# Patient Record
Sex: MALE | Race: WHITE | NOT HISPANIC OR LATINO | Employment: FULL TIME | URBAN - METROPOLITAN AREA
[De-identification: names, ages, dates, MRNs, and addresses within clinical notes are randomized per-mention and may not be internally consistent; named-entity substitution may affect disease eponyms.]

---

## 2017-01-08 ENCOUNTER — HOSPITAL ENCOUNTER (EMERGENCY)
Facility: HOSPITAL | Age: 57
Discharge: HOME | End: 2017-01-08
Attending: EMERGENCY MEDICINE | Admitting: EMERGENCY MEDICINE
Payer: COMMERCIAL

## 2017-01-08 VITALS
BODY MASS INDEX: 39.01 KG/M2 | TEMPERATURE: 97.5 F | RESPIRATION RATE: 20 BRPM | HEART RATE: 83 BPM | DIASTOLIC BLOOD PRESSURE: 87 MMHG | WEIGHT: 288 LBS | HEIGHT: 72 IN | OXYGEN SATURATION: 95 % | SYSTOLIC BLOOD PRESSURE: 139 MMHG

## 2017-01-08 DIAGNOSIS — R09.81 NASAL CONGESTION: Primary | ICD-10-CM

## 2017-01-08 PROCEDURE — 99283 EMERGENCY DEPT VISIT LOW MDM: CPT

## 2017-01-08 RX ORDER — FLUTICASONE PROPIONATE 50 MCG
1 SPRAY, SUSPENSION (ML) NASAL 2 TIMES DAILY
Qty: 1 BOTTLE | Refills: 0 | Status: SHIPPED | OUTPATIENT
Start: 2017-01-08 | End: 2017-10-31

## 2017-01-08 RX ORDER — CETIRIZINE HYDROCHLORIDE, PSEUDOEPHEDRINE HYDROCHLORIDE 5; 120 MG/1; MG/1
1 TABLET, FILM COATED, EXTENDED RELEASE ORAL 2 TIMES DAILY
Qty: 20 TABLET | Refills: 0 | Status: SHIPPED | OUTPATIENT
Start: 2017-01-08 | End: 2017-01-18

## 2017-01-13 DIAGNOSIS — E78.5 HYPERLIPIDEMIA: ICD-10-CM

## 2017-01-13 DIAGNOSIS — I10 ESSENTIAL (PRIMARY) HYPERTENSION: ICD-10-CM

## 2017-01-30 ENCOUNTER — GENERIC CONVERSION - ENCOUNTER (OUTPATIENT)
Dept: OTHER | Facility: OTHER | Age: 57
End: 2017-01-30

## 2017-02-27 ENCOUNTER — GENERIC CONVERSION - ENCOUNTER (OUTPATIENT)
Dept: OTHER | Facility: OTHER | Age: 57
End: 2017-02-27

## 2017-03-02 ENCOUNTER — HOSPITAL ENCOUNTER (EMERGENCY)
Facility: HOSPITAL | Age: 57
Discharge: HOME | End: 2017-03-02
Attending: EMERGENCY MEDICINE | Admitting: EMERGENCY MEDICINE
Payer: COMMERCIAL

## 2017-03-02 VITALS
WEIGHT: 275 LBS | OXYGEN SATURATION: 98 % | DIASTOLIC BLOOD PRESSURE: 103 MMHG | BODY MASS INDEX: 37.25 KG/M2 | HEART RATE: 89 BPM | RESPIRATION RATE: 20 BRPM | TEMPERATURE: 97.8 F | SYSTOLIC BLOOD PRESSURE: 141 MMHG | HEIGHT: 72 IN

## 2017-03-02 DIAGNOSIS — R68.89 FLU-LIKE SYMPTOMS: Primary | ICD-10-CM

## 2017-03-02 PROCEDURE — A9270 NON-COVERED ITEM OR SERVICE: HCPCS | Performed by: EMERGENCY MEDICINE

## 2017-03-02 PROCEDURE — 99283 EMERGENCY DEPT VISIT LOW MDM: CPT

## 2017-03-02 RX ORDER — PREDNISONE 20 MG/1
60 TABLET ORAL DAILY
Qty: 15 TABLET | Refills: 0 | Status: SHIPPED | OUTPATIENT
Start: 2017-03-02 | End: 2017-03-07

## 2017-03-02 RX ORDER — ALBUTEROL SULFATE 90 UG/1
2 AEROSOL, METERED RESPIRATORY (INHALATION) ONCE
Status: COMPLETED | OUTPATIENT
Start: 2017-03-02 | End: 2017-03-02

## 2017-03-02 RX ORDER — ALBUTEROL SULFATE 90 UG/1
2 AEROSOL, METERED RESPIRATORY (INHALATION) EVERY 4 HOURS PRN
Qty: 1 INHALER | Refills: 0 | Status: SHIPPED | OUTPATIENT
Start: 2017-03-02 | End: 2017-04-01

## 2017-03-02 RX ORDER — PREDNISONE 20 MG/1
60 TABLET ORAL ONCE
Status: COMPLETED | OUTPATIENT
Start: 2017-03-02 | End: 2017-03-02

## 2017-03-02 RX ADMIN — PREDNISONE 60 MG: 20 TABLET ORAL at 03:42

## 2017-03-02 RX ADMIN — ALBUTEROL SULFATE 2 PUFF: 90 AEROSOL, METERED RESPIRATORY (INHALATION) at 03:42

## 2017-03-03 ENCOUNTER — ALLSCRIPTS OFFICE VISIT (OUTPATIENT)
Dept: OTHER | Facility: OTHER | Age: 57
End: 2017-03-03

## 2017-03-09 ENCOUNTER — GENERIC CONVERSION - ENCOUNTER (OUTPATIENT)
Dept: OTHER | Facility: OTHER | Age: 57
End: 2017-03-09

## 2017-03-13 ENCOUNTER — APPOINTMENT (EMERGENCY)
Dept: CT IMAGING | Facility: HOSPITAL | Age: 57
End: 2017-03-13
Payer: COMMERCIAL

## 2017-03-13 ENCOUNTER — HOSPITAL ENCOUNTER (EMERGENCY)
Facility: HOSPITAL | Age: 57
Discharge: HOME | End: 2017-03-13
Attending: EMERGENCY MEDICINE | Admitting: EMERGENCY MEDICINE
Payer: COMMERCIAL

## 2017-03-13 VITALS
SYSTOLIC BLOOD PRESSURE: 148 MMHG | HEIGHT: 71 IN | RESPIRATION RATE: 18 BRPM | BODY MASS INDEX: 39.2 KG/M2 | DIASTOLIC BLOOD PRESSURE: 90 MMHG | OXYGEN SATURATION: 98 % | WEIGHT: 280 LBS | HEART RATE: 100 BPM | TEMPERATURE: 99.2 F

## 2017-03-13 DIAGNOSIS — R11.0 NAUSEA: Primary | ICD-10-CM

## 2017-03-13 DIAGNOSIS — R19.7 DIARRHEA, UNSPECIFIED TYPE: ICD-10-CM

## 2017-03-13 LAB
ALBUMIN SERPL BCP-MCNC: 3.8 G/DL (ref 3.5–5)
ALP SERPL-CCNC: 66 U/L (ref 46–116)
ALT SERPL W P-5'-P-CCNC: 33 U/L (ref 12–78)
ANION GAP SERPL CALCULATED.3IONS-SCNC: 9 MMOL/L (ref 4–13)
AST SERPL W P-5'-P-CCNC: 17 U/L (ref 5–45)
ATRIAL RATE: 100 BPM
BASOPHILS # BLD MANUAL: 0 THOUSAND/UL (ref 0–0.1)
BASOPHILS NFR MAR MANUAL: 0 % (ref 0–1)
BILIRUB SERPL-MCNC: 0.7 MG/DL (ref 0.2–1)
BUN SERPL-MCNC: 24 MG/DL (ref 5–25)
CALCIUM SERPL-MCNC: 9 MG/DL (ref 8.3–10.1)
CHLORIDE SERPL-SCNC: 102 MMOL/L (ref 100–108)
CLARITY, POC: ABNORMAL
CO2 SERPL-SCNC: 29 MMOL/L (ref 21–32)
COLOR, POC: YELLOW
CREAT SERPL-MCNC: 1.07 MG/DL (ref 0.6–1.3)
EOSINOPHIL # BLD MANUAL: 0 THOUSAND/UL (ref 0–0.4)
EOSINOPHIL NFR BLD MANUAL: 0 % (ref 0–6)
ERYTHROCYTE [DISTWIDTH] IN BLOOD BY AUTOMATED COUNT: 13.1 % (ref 11.6–15.1)
EXT BILIRUBIN, UA: NEGATIVE
EXT BLOOD URINE: NEGATIVE
EXT GLUCOSE, UA: NEGATIVE
EXT KETONES: NEGATIVE
EXT NITRITE, UA: NEGATIVE
EXT PH, UA: 5
EXT PROTEIN, UA: NEGATIVE
EXT SPECIFIC GRAVITY, UA: 1.02
EXT UROBILINOGEN: 0.2
GFR SERPL CREATININE-BSD FRML MDRD: >60 ML/MIN/1.73SQ M
GLUCOSE SERPL-MCNC: 134 MG/DL (ref 65–140)
HCT VFR BLD AUTO: 49.6 % (ref 36.5–49.3)
HGB BLD-MCNC: 17 G/DL (ref 12–17)
LYMPHOCYTES # BLD AUTO: 0.29 THOUSAND/UL (ref 0.6–4.47)
LYMPHOCYTES # BLD AUTO: 2 % (ref 14–44)
MCH RBC QN AUTO: 30.6 PG (ref 26.8–34.3)
MCHC RBC AUTO-ENTMCNC: 34.3 G/DL (ref 31.4–37.4)
MCV RBC AUTO: 89 FL (ref 82–98)
MONOCYTES # BLD AUTO: 0.29 THOUSAND/UL (ref 0–1.22)
MONOCYTES NFR BLD: 2 % (ref 4–12)
NEUTROPHILS # BLD MANUAL: 14.1 THOUSAND/UL (ref 1.85–7.62)
NEUTS SEG NFR BLD AUTO: 96 % (ref 43–75)
NRBC BLD AUTO-RTO: 0 /100 WBCS
P AXIS: 54 DEGREES
PLATELET # BLD AUTO: 193 THOUSANDS/UL (ref 149–390)
PLATELET BLD QL SMEAR: ADEQUATE
PMV BLD AUTO: 9 FL (ref 8.9–12.7)
POTASSIUM SERPL-SCNC: 4.3 MMOL/L (ref 3.5–5.3)
PR INTERVAL: 146 MS
PROT SERPL-MCNC: 7.9 G/DL (ref 6.4–8.2)
QRS AXIS: 11 DEGREES
QRSD INTERVAL: 74 MS
QT INTERVAL: 344 MS
QTC INTERVAL: 443 MS
RBC # BLD AUTO: 5.55 MILLION/UL (ref 3.88–5.62)
SODIUM SERPL-SCNC: 140 MMOL/L (ref 136–145)
T WAVE AXIS: 45 DEGREES
TOTAL CELLS COUNTED SPEC: 100
TROPONIN I SERPL-MCNC: <0.02 NG/ML
VENTRICULAR RATE: 100 BPM
WBC # BLD AUTO: 14.69 THOUSAND/UL (ref 4.31–10.16)
WBC # BLD EST: ABNORMAL 10*3/UL

## 2017-03-13 PROCEDURE — 36415 COLL VENOUS BLD VENIPUNCTURE: CPT | Performed by: PHYSICIAN ASSISTANT

## 2017-03-13 PROCEDURE — 99285 EMERGENCY DEPT VISIT HI MDM: CPT

## 2017-03-13 PROCEDURE — 74177 CT ABD & PELVIS W/CONTRAST: CPT

## 2017-03-13 PROCEDURE — 96374 THER/PROPH/DIAG INJ IV PUSH: CPT

## 2017-03-13 PROCEDURE — 96361 HYDRATE IV INFUSION ADD-ON: CPT

## 2017-03-13 PROCEDURE — 85027 COMPLETE CBC AUTOMATED: CPT | Performed by: PHYSICIAN ASSISTANT

## 2017-03-13 PROCEDURE — 93005 ELECTROCARDIOGRAM TRACING: CPT | Performed by: PHYSICIAN ASSISTANT

## 2017-03-13 PROCEDURE — 85007 BL SMEAR W/DIFF WBC COUNT: CPT | Performed by: PHYSICIAN ASSISTANT

## 2017-03-13 PROCEDURE — 96376 TX/PRO/DX INJ SAME DRUG ADON: CPT

## 2017-03-13 PROCEDURE — 80053 COMPREHEN METABOLIC PANEL: CPT | Performed by: PHYSICIAN ASSISTANT

## 2017-03-13 PROCEDURE — 84484 ASSAY OF TROPONIN QUANT: CPT | Performed by: PHYSICIAN ASSISTANT

## 2017-03-13 PROCEDURE — 81002 URINALYSIS NONAUTO W/O SCOPE: CPT | Performed by: PHYSICIAN ASSISTANT

## 2017-03-13 RX ORDER — ONDANSETRON 2 MG/ML
4 INJECTION INTRAMUSCULAR; INTRAVENOUS ONCE
Status: COMPLETED | OUTPATIENT
Start: 2017-03-13 | End: 2017-03-13

## 2017-03-13 RX ORDER — ONDANSETRON 4 MG/1
4 TABLET, ORALLY DISINTEGRATING ORAL EVERY 8 HOURS PRN
Qty: 15 TABLET | Refills: 0 | Status: SHIPPED | OUTPATIENT
Start: 2017-03-13 | End: 2017-05-04

## 2017-03-13 RX ADMIN — SODIUM CHLORIDE 1000 ML: 0.9 INJECTION, SOLUTION INTRAVENOUS at 17:51

## 2017-03-13 RX ADMIN — IOHEXOL 100 ML: 350 INJECTION, SOLUTION INTRAVENOUS at 17:35

## 2017-03-13 RX ADMIN — SODIUM CHLORIDE 1000 ML: 0.9 INJECTION, SOLUTION INTRAVENOUS at 15:22

## 2017-03-13 RX ADMIN — ONDANSETRON 4 MG: 2 INJECTION INTRAMUSCULAR; INTRAVENOUS at 15:22

## 2017-03-13 RX ADMIN — ONDANSETRON 4 MG: 2 INJECTION INTRAMUSCULAR; INTRAVENOUS at 17:51

## 2017-05-04 ENCOUNTER — HOSPITAL ENCOUNTER (EMERGENCY)
Facility: HOSPITAL | Age: 57
Discharge: HOME | End: 2017-05-04
Attending: EMERGENCY MEDICINE | Admitting: EMERGENCY MEDICINE
Payer: COMMERCIAL

## 2017-05-04 VITALS
SYSTOLIC BLOOD PRESSURE: 162 MMHG | RESPIRATION RATE: 18 BRPM | OXYGEN SATURATION: 95 % | HEIGHT: 72 IN | BODY MASS INDEX: 36.57 KG/M2 | WEIGHT: 270 LBS | DIASTOLIC BLOOD PRESSURE: 95 MMHG | HEART RATE: 73 BPM | TEMPERATURE: 97.4 F

## 2017-05-04 DIAGNOSIS — J20.9 ACUTE BRONCHITIS: Primary | ICD-10-CM

## 2017-05-04 DIAGNOSIS — R09.82 POST-NASAL DRIP: ICD-10-CM

## 2017-05-04 PROCEDURE — 99283 EMERGENCY DEPT VISIT LOW MDM: CPT

## 2017-05-04 RX ORDER — FLUTICASONE PROPIONATE 50 MCG
1 SPRAY, SUSPENSION (ML) NASAL DAILY
Qty: 1 BOTTLE | Refills: 0 | Status: SHIPPED | OUTPATIENT
Start: 2017-05-04 | End: 2017-10-31

## 2017-05-04 RX ORDER — AZITHROMYCIN 250 MG/1
TABLET, FILM COATED ORAL
Qty: 6 TABLET | Refills: 0 | Status: SHIPPED | OUTPATIENT
Start: 2017-05-04 | End: 2017-10-31

## 2017-05-04 RX ORDER — NAPROXEN SODIUM 220 MG
1 TABLET ORAL DAILY
COMMUNITY
End: 2017-11-29

## 2017-05-06 ENCOUNTER — HOSPITAL ENCOUNTER (EMERGENCY)
Facility: HOSPITAL | Age: 57
Discharge: HOME | End: 2017-05-06
Attending: EMERGENCY MEDICINE | Admitting: EMERGENCY MEDICINE
Payer: COMMERCIAL

## 2017-05-06 VITALS
BODY MASS INDEX: 36.57 KG/M2 | WEIGHT: 270 LBS | TEMPERATURE: 97.4 F | HEIGHT: 72 IN | OXYGEN SATURATION: 96 % | RESPIRATION RATE: 18 BRPM | HEART RATE: 83 BPM | SYSTOLIC BLOOD PRESSURE: 158 MMHG | DIASTOLIC BLOOD PRESSURE: 80 MMHG

## 2017-05-06 DIAGNOSIS — J20.9 ACUTE BRONCHITIS WITH BRONCHOSPASM: Primary | ICD-10-CM

## 2017-05-06 PROCEDURE — 99283 EMERGENCY DEPT VISIT LOW MDM: CPT

## 2017-05-06 RX ORDER — GUAIFENESIN AND CODEINE PHOSPHATE 100; 10 MG/5ML; MG/5ML
5 SOLUTION ORAL 3 TIMES DAILY PRN
Qty: 90 ML | Refills: 0 | Status: SHIPPED | OUTPATIENT
Start: 2017-05-06 | End: 2017-05-13

## 2017-05-06 RX ORDER — PREDNISONE 1 MG/1
TABLET ORAL
Qty: 21 TABLET | Refills: 0 | Status: SHIPPED | OUTPATIENT
Start: 2017-05-06 | End: 2017-10-31

## 2017-05-06 RX ORDER — ALBUTEROL SULFATE 90 UG/1
2 AEROSOL, METERED RESPIRATORY (INHALATION) EVERY 6 HOURS PRN
Qty: 1 INHALER | Refills: 0 | Status: SHIPPED | OUTPATIENT
Start: 2017-05-06 | End: 2017-06-05

## 2017-09-19 ENCOUNTER — ALLSCRIPTS OFFICE VISIT (OUTPATIENT)
Dept: OTHER | Facility: OTHER | Age: 57
End: 2017-09-19

## 2017-09-19 DIAGNOSIS — K42.0 UMBILICAL HERNIA WITH OBSTRUCTION, WITHOUT GANGRENE: ICD-10-CM

## 2017-09-19 DIAGNOSIS — R10.9 ABDOMINAL PAIN: ICD-10-CM

## 2017-09-26 ENCOUNTER — ALLSCRIPTS OFFICE VISIT (OUTPATIENT)
Dept: OTHER | Facility: OTHER | Age: 57
End: 2017-09-26

## 2017-09-30 ENCOUNTER — TRANSCRIBE ORDERS (OUTPATIENT)
Dept: ADMINISTRATIVE | Facility: HOSPITAL | Age: 57
End: 2017-09-30

## 2017-09-30 ENCOUNTER — APPOINTMENT (OUTPATIENT)
Dept: LAB | Facility: HOSPITAL | Age: 57
End: 2017-09-30
Payer: COMMERCIAL

## 2017-09-30 DIAGNOSIS — I10 ESSENTIAL (PRIMARY) HYPERTENSION: ICD-10-CM

## 2017-09-30 DIAGNOSIS — E78.5 HYPERLIPIDEMIA: ICD-10-CM

## 2017-09-30 LAB
ALBUMIN SERPL BCP-MCNC: 3.6 G/DL (ref 3.5–5)
ALP SERPL-CCNC: 68 U/L (ref 46–116)
ALT SERPL W P-5'-P-CCNC: 24 U/L (ref 12–78)
ANION GAP SERPL CALCULATED.3IONS-SCNC: 9 MMOL/L (ref 4–13)
AST SERPL W P-5'-P-CCNC: 12 U/L (ref 5–45)
BASOPHILS # BLD AUTO: 0.07 THOUSANDS/ΜL (ref 0–0.1)
BASOPHILS NFR BLD AUTO: 1 % (ref 0–1)
BILIRUB SERPL-MCNC: 0.4 MG/DL (ref 0.2–1)
BUN SERPL-MCNC: 17 MG/DL (ref 5–25)
CALCIUM SERPL-MCNC: 9.5 MG/DL (ref 8.3–10.1)
CHLORIDE SERPL-SCNC: 103 MMOL/L (ref 100–108)
CHOLEST SERPL-MCNC: 166 MG/DL (ref 50–200)
CO2 SERPL-SCNC: 28 MMOL/L (ref 21–32)
CREAT SERPL-MCNC: 1.24 MG/DL (ref 0.6–1.3)
EOSINOPHIL # BLD AUTO: 0.55 THOUSAND/ΜL (ref 0–0.61)
EOSINOPHIL NFR BLD AUTO: 8 % (ref 0–6)
ERYTHROCYTE [DISTWIDTH] IN BLOOD BY AUTOMATED COUNT: 12.3 % (ref 11.6–15.1)
GFR SERPL CREATININE-BSD FRML MDRD: 65 ML/MIN/1.73SQ M
GLUCOSE P FAST SERPL-MCNC: 116 MG/DL (ref 65–99)
HCT VFR BLD AUTO: 46.4 % (ref 36.5–49.3)
HDLC SERPL-MCNC: 29 MG/DL (ref 40–60)
HGB BLD-MCNC: 16.2 G/DL (ref 12–17)
LDLC SERPL CALC-MCNC: 91 MG/DL (ref 0–100)
LYMPHOCYTES # BLD AUTO: 1.54 THOUSANDS/ΜL (ref 0.6–4.47)
LYMPHOCYTES NFR BLD AUTO: 22 % (ref 14–44)
MCH RBC QN AUTO: 30.2 PG (ref 26.8–34.3)
MCHC RBC AUTO-ENTMCNC: 34.9 G/DL (ref 31.4–37.4)
MCV RBC AUTO: 86 FL (ref 82–98)
MONOCYTES # BLD AUTO: 0.54 THOUSAND/ΜL (ref 0.17–1.22)
MONOCYTES NFR BLD AUTO: 8 % (ref 4–12)
NEUTROPHILS # BLD AUTO: 4.38 THOUSANDS/ΜL (ref 1.85–7.62)
NEUTS SEG NFR BLD AUTO: 61 % (ref 43–75)
NRBC BLD AUTO-RTO: 0 /100 WBCS
PLATELET # BLD AUTO: 238 THOUSANDS/UL (ref 149–390)
PMV BLD AUTO: 8.5 FL (ref 8.9–12.7)
POTASSIUM SERPL-SCNC: 3.9 MMOL/L (ref 3.5–5.3)
PROT SERPL-MCNC: 8 G/DL (ref 6.4–8.2)
RBC # BLD AUTO: 5.37 MILLION/UL (ref 3.88–5.62)
SODIUM SERPL-SCNC: 140 MMOL/L (ref 136–145)
TRIGL SERPL-MCNC: 230 MG/DL
WBC # BLD AUTO: 7.13 THOUSAND/UL (ref 4.31–10.16)

## 2017-09-30 PROCEDURE — 80061 LIPID PANEL: CPT

## 2017-09-30 PROCEDURE — 85025 COMPLETE CBC W/AUTO DIFF WBC: CPT

## 2017-09-30 PROCEDURE — 80053 COMPREHEN METABOLIC PANEL: CPT

## 2017-09-30 PROCEDURE — 36415 COLL VENOUS BLD VENIPUNCTURE: CPT

## 2017-10-02 ENCOUNTER — GENERIC CONVERSION - ENCOUNTER (OUTPATIENT)
Dept: OTHER | Facility: OTHER | Age: 57
End: 2017-10-02

## 2017-10-02 ENCOUNTER — ALLSCRIPTS OFFICE VISIT (OUTPATIENT)
Dept: OTHER | Facility: OTHER | Age: 57
End: 2017-10-02

## 2017-10-05 ENCOUNTER — HOSPITAL ENCOUNTER (OUTPATIENT)
Dept: CT IMAGING | Facility: HOSPITAL | Age: 57
Discharge: HOME | End: 2017-10-05
Attending: SURGERY
Payer: COMMERCIAL

## 2017-10-05 DIAGNOSIS — R10.9 ABDOMINAL PAIN: ICD-10-CM

## 2017-10-05 PROCEDURE — 74177 CT ABD & PELVIS W/CONTRAST: CPT

## 2017-10-05 RX ADMIN — IOHEXOL 100 ML: 350 INJECTION, SOLUTION INTRAVENOUS at 11:09

## 2017-10-12 ENCOUNTER — GENERIC CONVERSION - ENCOUNTER (OUTPATIENT)
Dept: OTHER | Facility: OTHER | Age: 57
End: 2017-10-12

## 2017-10-26 NOTE — CONSULTS
Assessment  1  Abdominal pain (789 00) (R10 9)    Plan  Abdominal pain    · Omeprazole 20 MG Oral Capsule Delayed Release; TAKE 1 CAPSULE Daily after  supper   Rx By: Sundar Sanchez; Dispense: 90 Days ; #:90 Capsule Delayed Release; Refill: 3;For: Abdominal pain; CHRISTIAN = N; Sent To: [facility]/PHARMACY #[id number]   · Follow-up visit in 2 weeks Evaluation and Treatment  Follow-up  Status: Hold For -  Scheduling  Requested for: [id number]   Ordered; For: Abdominal pain; Ordered By: Sundar Sanchez Performed:  Due: [id number]    Discussion/Summary  Discussion Summary:   59-year-old male with a past medical history significant for hypertension, morbidly obese, who has been complaining of mid abdominal pain for the last couple months  On examination, there was no evidence of recurrence of the umbilical hernia  He also has epigastric tenderness  I advised the patient to start taking omeprazole 20 mg once a day and I also recommended a CT scan of the abdomen and pelvis  The patient will return to my office in 2 weeks to discuss the results  Chief Complaint  Chief Complaint Free Text Note Form: Consult Previous Hernia Sx mesh      History of Present Illness  HPI: I had the pleasure of seeing Ayala Hernández in the office today accompanied by his wife for evaluation of mid abdominal pain  The patient is a pleasant 59-year-old male who started noticing pain around the umbilicus for the last couple months, he is a status post umbilical hernia repair in 2005 at [facility]  In addition the patient takes lisinopril and Aleve for pains and he is also complaining of occasional reflux symptoms  He denies any nausea, vomiting, diarrhea, constipation or any other constitutional symptoms  The operative report for the hernia surgery is not available at this time  Review of Systems  Complete-Male:   Constitutional: no fever-- and-- no chills  Eyes: no eye pain-- and-- no purulent discharge from the eyes     ENT: no earache,-- no nosebleeds-- and-- no sore throat  Cardiovascular: no chest pain-- and-- no palpitations  Respiratory: no shortness of breath,-- no cough-- and-- no wheezing  Gastrointestinal: as noted in HPI  Genitourinary: no dysuria-- and-- no incontinence  Musculoskeletal: no arthralgias-- and-- no myalgias  Integumentary: no rashes-- and-- no skin lesions  Neurological: no headache-- and-- no convulsions  Psychiatric: no anxiety-- and-- no depression  Hematologic/Lymphatic: no swollen glands-- and-- no tendency for easy bleeding  ROS Reviewed:   ROS reviewed  Active Problems  1  Elevated blood sugar (790 29) (R73 9)   2  Hyperlipidemia (272 4) (E78 5)   3  Hypertension, benign (401 1) (I10)   4  Knee pain, acute, right (719 46) (M25 561)   5  Obesity (278 00) (E66 9)   6  Screening for colon cancer (V76 51) (Z12 11)    Past Medical History  1  History of Benign essential hypertension (401 1) (I10)   2  History of Exposure to scabies (V01 89) (Z20 89)   3  History of Hip pain, right (719 45) (M25 551)  Active Problems And Past Medical History Reviewed: The active problems and past medical history were reviewed and updated today  Surgical History  1  History of Incision And Drainage Of Skin Abscess Hip On The Right   2  History of Umbilical Hernia Repair  Surgical History Reviewed: The surgical history was reviewed and updated today  Family History  Father    1  Family history of Throat cancer  Family History Reviewed: The family history was reviewed and updated today  Social History   · Current every day smoker (305 1) (F17 200)  Social History Reviewed: The social history was reviewed and updated today  The social history was reviewed and is unchanged  Current Meds   1  Lisinopril-Hydrochlorothiazide 20-12 5 MG Oral Tablet; TAKE 1 TABLET DAILY; Therapy: 14FTL2030 to (Evaluate:[id number])  Requested for: [id number]; Last   Rx:07Jun2017 Ordered  Medication List Reviewed:    The medication list was reviewed and updated today  Allergies  1  Ceclor    Vitals  Vital Signs    Recorded: 19Sep2017 03:07PM   Temperature 98 F, Oral   Systolic 118, LUE, Sitting   Diastolic 90, LUE, Sitting   Height 6 ft 1 in   Weight 277 lb    BMI Calculated 36 55   BSA Calculated 2 47     Physical Exam    Constitutional   General appearance: No acute distress, well appearing and well nourished  Head and Face   Head and face: Normal     Eyes   Conjunctiva and lids: No erythema, swelling or discharge  Pupils and irises: Equal, round, reactive to light  Ears, Nose, Mouth, and Throat   External inspection of ears and nose: Normal     Oropharynx: Normal with no erythema, edema, exudate or lesions  Neck   Neck: Supple, symmetric, trachea midline, no masses  Thyroid: Normal, no thyromegaly  Pulmonary   Respiratory effort: No increased work of breathing or signs of respiratory distress  Auscultation of lungs: Clear to auscultation  Cardiovascular   Auscultation of heart: Normal rate and rhythm, normal S1 and S2, no murmurs  Abdomen   Abdomen: Abnormal  -- The abdomen is obese, soft, nontender and the epigastric and mid abdomen without guarding or rebound  Liver and spleen: No hepatomegaly or splenomegaly  Lymphatic   Palpation of lymph nodes in neck: No lymphadenopathy  Palpation of lymph nodes in axillae: No lymphadenopathy  Skin   Skin and subcutaneous tissue: Normal without rashes or lesions  Palpation of skin and subcutaneous tissue: Normal turgor  Neurologic   Cranial nerves: Cranial nerves 2-12 intact  Sensation: No sensory loss      Psychiatric   Judgment and insight: Normal     Orientation to person, place and time: Normal        Signatures   Electronically signed by : Virgie Patel MD; Sep 19 2017  3:28PM EST                       (Author)

## 2017-10-31 ENCOUNTER — APPOINTMENT (EMERGENCY)
Dept: CT IMAGING | Facility: HOSPITAL | Age: 57
End: 2017-10-31
Payer: COMMERCIAL

## 2017-10-31 ENCOUNTER — HOSPITAL ENCOUNTER (EMERGENCY)
Facility: HOSPITAL | Age: 57
Discharge: HOME | End: 2017-10-31
Attending: EMERGENCY MEDICINE
Payer: COMMERCIAL

## 2017-10-31 VITALS
SYSTOLIC BLOOD PRESSURE: 142 MMHG | WEIGHT: 271 LBS | HEART RATE: 66 BPM | TEMPERATURE: 98.2 F | BODY MASS INDEX: 36.75 KG/M2 | DIASTOLIC BLOOD PRESSURE: 94 MMHG | OXYGEN SATURATION: 94 % | RESPIRATION RATE: 18 BRPM

## 2017-10-31 DIAGNOSIS — R10.9 ABDOMINAL PAIN: Primary | ICD-10-CM

## 2017-10-31 LAB
ALBUMIN SERPL BCP-MCNC: 3.6 G/DL (ref 3.5–5)
ALP SERPL-CCNC: 73 U/L (ref 46–116)
ALT SERPL W P-5'-P-CCNC: 26 U/L (ref 12–78)
ANION GAP SERPL CALCULATED.3IONS-SCNC: 6 MMOL/L (ref 4–13)
AST SERPL W P-5'-P-CCNC: 14 U/L (ref 5–45)
BASOPHILS # BLD AUTO: 0.05 THOUSANDS/ΜL (ref 0–0.1)
BASOPHILS NFR BLD AUTO: 1 % (ref 0–1)
BILIRUB SERPL-MCNC: 0.4 MG/DL (ref 0.2–1)
BUN SERPL-MCNC: 21 MG/DL (ref 5–25)
CALCIUM SERPL-MCNC: 8.4 MG/DL (ref 8.3–10.1)
CHLORIDE SERPL-SCNC: 105 MMOL/L (ref 100–108)
CLARITY, POC: CLEAR
CO2 SERPL-SCNC: 31 MMOL/L (ref 21–32)
COLOR, POC: NORMAL
CREAT SERPL-MCNC: 1.02 MG/DL (ref 0.6–1.3)
EOSINOPHIL # BLD AUTO: 0.41 THOUSAND/ΜL (ref 0–0.61)
EOSINOPHIL NFR BLD AUTO: 6 % (ref 0–6)
ERYTHROCYTE [DISTWIDTH] IN BLOOD BY AUTOMATED COUNT: 13.2 % (ref 11.6–15.1)
EXT BILIRUBIN, UA: NEGATIVE
EXT BLOOD URINE: NEGATIVE
EXT GLUCOSE, UA: NORMAL
EXT KETONES: NEGATIVE
EXT NITRITE, UA: NEGATIVE
EXT PH, UA: 6.5
EXT PROTEIN, UA: NORMAL
EXT SPECIFIC GRAVITY, UA: 1.02
EXT UROBILINOGEN: 0.2
GFR SERPL CREATININE-BSD FRML MDRD: 82 ML/MIN/1.73SQ M
GLUCOSE SERPL-MCNC: 118 MG/DL (ref 65–140)
HCT VFR BLD AUTO: 45.6 % (ref 36.5–49.3)
HGB BLD-MCNC: 15.3 G/DL (ref 12–17)
LIPASE SERPL-CCNC: 183 U/L (ref 73–393)
LYMPHOCYTES # BLD AUTO: 1.31 THOUSANDS/ΜL (ref 0.6–4.47)
LYMPHOCYTES NFR BLD AUTO: 19 % (ref 14–44)
MCH RBC QN AUTO: 30.1 PG (ref 26.8–34.3)
MCHC RBC AUTO-ENTMCNC: 33.6 G/DL (ref 31.4–37.4)
MCV RBC AUTO: 90 FL (ref 82–98)
MONOCYTES # BLD AUTO: 0.61 THOUSAND/ΜL (ref 0.17–1.22)
MONOCYTES NFR BLD AUTO: 9 % (ref 4–12)
NEUTROPHILS # BLD AUTO: 4.52 THOUSANDS/ΜL (ref 1.85–7.62)
NEUTS SEG NFR BLD AUTO: 65 % (ref 43–75)
NRBC BLD AUTO-RTO: 0 /100 WBCS
PLATELET # BLD AUTO: 196 THOUSANDS/UL (ref 149–390)
PMV BLD AUTO: 8.9 FL (ref 8.9–12.7)
POTASSIUM SERPL-SCNC: 3.9 MMOL/L (ref 3.5–5.3)
PROT SERPL-MCNC: 7.5 G/DL (ref 6.4–8.2)
RBC # BLD AUTO: 5.09 MILLION/UL (ref 3.88–5.62)
SODIUM SERPL-SCNC: 142 MMOL/L (ref 136–145)
WBC # BLD AUTO: 6.92 THOUSAND/UL (ref 4.31–10.16)
WBC # BLD EST: NEGATIVE 10*3/UL

## 2017-10-31 PROCEDURE — 85025 COMPLETE CBC W/AUTO DIFF WBC: CPT | Performed by: EMERGENCY MEDICINE

## 2017-10-31 PROCEDURE — 96361 HYDRATE IV INFUSION ADD-ON: CPT

## 2017-10-31 PROCEDURE — 36415 COLL VENOUS BLD VENIPUNCTURE: CPT | Performed by: EMERGENCY MEDICINE

## 2017-10-31 PROCEDURE — 80053 COMPREHEN METABOLIC PANEL: CPT | Performed by: EMERGENCY MEDICINE

## 2017-10-31 PROCEDURE — 74177 CT ABD & PELVIS W/CONTRAST: CPT

## 2017-10-31 PROCEDURE — 96376 TX/PRO/DX INJ SAME DRUG ADON: CPT

## 2017-10-31 PROCEDURE — 99284 EMERGENCY DEPT VISIT MOD MDM: CPT

## 2017-10-31 PROCEDURE — 83690 ASSAY OF LIPASE: CPT | Performed by: EMERGENCY MEDICINE

## 2017-10-31 PROCEDURE — 81002 URINALYSIS NONAUTO W/O SCOPE: CPT | Performed by: EMERGENCY MEDICINE

## 2017-10-31 PROCEDURE — 96374 THER/PROPH/DIAG INJ IV PUSH: CPT

## 2017-10-31 RX ORDER — SODIUM CHLORIDE, SODIUM LACTATE, POTASSIUM CHLORIDE, CALCIUM CHLORIDE 600; 310; 30; 20 MG/100ML; MG/100ML; MG/100ML; MG/100ML
100 INJECTION, SOLUTION INTRAVENOUS CONTINUOUS
Status: CANCELLED | OUTPATIENT
Start: 2017-11-15

## 2017-10-31 RX ORDER — SODIUM CHLORIDE 9 MG/ML
125 INJECTION, SOLUTION INTRAVENOUS CONTINUOUS
Status: DISCONTINUED | OUTPATIENT
Start: 2017-10-31 | End: 2017-10-31 | Stop reason: HOSPADM

## 2017-10-31 RX ORDER — FAMOTIDINE 20 MG/1
20 TABLET, FILM COATED ORAL 2 TIMES DAILY
Qty: 60 TABLET | Refills: 0 | Status: SHIPPED | OUTPATIENT
Start: 2017-10-31 | End: 2018-03-13 | Stop reason: ALTCHOICE

## 2017-10-31 RX ORDER — KETOROLAC TROMETHAMINE 30 MG/ML
15 INJECTION, SOLUTION INTRAMUSCULAR; INTRAVENOUS ONCE
Status: COMPLETED | OUTPATIENT
Start: 2017-10-31 | End: 2017-10-31

## 2017-10-31 RX ORDER — ONDANSETRON 4 MG/1
4 TABLET, FILM COATED ORAL EVERY 8 HOURS PRN
Qty: 12 TABLET | Refills: 0 | Status: SHIPPED | OUTPATIENT
Start: 2017-10-31 | End: 2018-06-06 | Stop reason: ALTCHOICE

## 2017-10-31 RX ORDER — DICYCLOMINE HCL 20 MG
20 TABLET ORAL EVERY 6 HOURS
Qty: 20 TABLET | Refills: 0 | Status: SHIPPED | OUTPATIENT
Start: 2017-10-31 | End: 2018-03-13 | Stop reason: ALTCHOICE

## 2017-10-31 RX ORDER — CLINDAMYCIN PHOSPHATE 900 MG/50ML
900 INJECTION INTRAVENOUS ONCE
Status: CANCELLED | OUTPATIENT
Start: 2017-11-15

## 2017-10-31 RX ADMIN — KETOROLAC TROMETHAMINE 15 MG: 30 INJECTION, SOLUTION INTRAMUSCULAR at 19:36

## 2017-10-31 RX ADMIN — IOHEXOL 100 ML: 350 INJECTION, SOLUTION INTRAVENOUS at 18:03

## 2017-10-31 RX ADMIN — KETOROLAC TROMETHAMINE 15 MG: 30 INJECTION, SOLUTION INTRAMUSCULAR at 16:33

## 2017-10-31 RX ADMIN — SODIUM CHLORIDE 125 ML/HR: 0.9 INJECTION, SOLUTION INTRAVENOUS at 16:33

## 2017-10-31 NOTE — ED NOTES
Patient transported to [address] [place] [address] , Critical access hospital0 [facility]  10/31/17 2745

## 2017-10-31 NOTE — ED PROVIDER NOTES
History  Chief Complaint   Patient presents with    Abdominal Pain     Pt states he is scheduled for a hernia repair on November 15th with Brenda Mention  Pt states he has had a cramping pain in his upper abdomen for the past 3 days  Pt denies N/V/D       History provided by:  Patient  Abdominal Pain   Pain location:  Epigastric and periumbilical  Pain quality: aching    Pain radiates to:  Does not radiate  Pain severity:  Moderate  Onset quality:  Gradual  Duration:  2 days  Timing:  Constant  Progression:  Worsening  Chronicity:  New  Context: previous surgery (prior hernia repair and scheduled for another one with Dr Carroll Sheehan on 11/15)    Context: not medication withdrawal    Relieved by:  Nothing  Worsened by:  Nothing  Ineffective treatments:  None tried  Associated symptoms: nausea    Associated symptoms: no anorexia, no chest pain, no chills, no diarrhea, no fatigue, no fever, no hematuria and no vomiting    Risk factors: obesity        Prior to Admission Medications   Prescriptions Last Dose Informant Patient Reported? Taking?   lisinopril (ZESTRIL) 10 mg tablet   Yes No   Sig: Take 10 mg by mouth daily   naproxen sodium (ALEVE) 220 MG tablet   Yes No   Sig: Take 1 tablet by mouth daily        Facility-Administered Medications: None       Past Medical History:   Diagnosis Date    Hypertension        Past Surgical History:   Procedure Laterality Date    HERNIA REPAIR         History reviewed  No pertinent family history  I have reviewed and agree with the history as documented  Social History   Substance Use Topics    Smoking status: Current Every Day Smoker     Packs/day: 0 20     Types: Cigarettes    Smokeless tobacco: Never Used    Alcohol use No        Review of Systems   Constitutional: Negative for chills, fatigue and fever  Cardiovascular: Negative for chest pain  Gastrointestinal: Positive for abdominal pain and nausea  Negative for anorexia, diarrhea and vomiting     Genitourinary: Negative for hematuria  All other systems reviewed and are negative  Physical Exam  ED Triage Vitals [10/31/17 1543]   Temperature Pulse Respirations Blood Pressure SpO2   98 2 °F (36 8 °C) 81 20 (!) 212/100 95 %      Temp Source Heart Rate Source Patient Position - Orthostatic VS BP Location FiO2 (%)   Oral Monitor Sitting Left arm --      Pain Score       8           Orthostatic Vital Signs  Vitals:    10/31/17 1543 10/31/17 1615 10/31/17 1730 10/31/17 1915   BP: (!) 212/100 (!) 157/112 143/78 149/81   Pulse: 81 78 72 74   Patient Position - Orthostatic VS: Sitting   Lying       Physical Exam   Constitutional: He is oriented to person, place, and time  He appears well-developed and well-nourished  No distress  HENT:   Head: Normocephalic and atraumatic  Nose: Nose normal    Mouth/Throat: Oropharynx is clear and moist    Eyes: Conjunctivae and EOM are normal  Pupils are equal, round, and reactive to light  Neck: Normal range of motion  Neck supple  Cardiovascular: Normal rate, regular rhythm, normal heart sounds and intact distal pulses  Pulmonary/Chest: Effort normal and breath sounds normal  No respiratory distress  Abdominal: Soft  Bowel sounds are normal  He exhibits no distension  There is tenderness (epigastric)  Musculoskeletal: Normal range of motion  Neurological: He is alert and oriented to person, place, and time  Skin: Skin is warm and dry  He is not diaphoretic  No erythema  Psychiatric: He has a normal mood and affect  Nursing note and vitals reviewed        ED Medications  Medications   sodium chloride 0 9 % infusion (0 mL/hr Intravenous Stopped 10/31/17 1926)   ketorolac (TORADOL) 30 mg/mL injection 15 mg (15 mg Intravenous Given 10/31/17 1633)   iohexol (OMNIPAQUE) 350 MG/ML injection (MULTI-DOSE) 100 mL (100 mL Intravenous Given 10/31/17 1803)   ketorolac (TORADOL) 30 mg/mL injection 15 mg (15 mg Intravenous Given 10/31/17 1936)       Diagnostic Studies  Results Reviewed Procedure Component Value Units Date/Time    Comprehensive metabolic panel [[id number]] Collected:  10/31/17 1631    Lab Status:  Final result Specimen:  Blood from Arm, Right Updated:  10/31/17 1704     Sodium 142 mmol/L      Potassium 3 9 mmol/L      Chloride 105 mmol/L      CO2 31 mmol/L      Anion Gap 6 mmol/L      BUN 21 mg/dL      Creatinine 1 02 mg/dL      Glucose 118 mg/dL      Calcium 8 4 mg/dL      AST 14 U/L      ALT 26 U/L      Alkaline Phosphatase 73 U/L      Total Protein 7 5 g/dL      Albumin 3 6 g/dL      Total Bilirubin 0 40 mg/dL      eGFR 82 ml/min/1 73sq m     Narrative:         National Kidney Disease Education Program recommendations are as follows:  GFR calculation is accurate only with a steady state creatinine  Chronic Kidney disease less than 60 ml/min/1 73 sq  meters  Kidney failure less than 15 ml/min/1 73 sq  meters      Lipase [[id number]]  (Normal) Collected:  10/31/17 1631    Lab Status:  Final result Specimen:  Blood from Arm, Right Updated:  10/31/17 1704     Lipase 183 u/L     POCT urinalysis dipstick [[id number]]  (Normal) Resulted:  10/31/17 1649    Lab Status:  Final result Specimen:  Urine Updated:  10/31/17 1650     Color, UA Vivienne     Clarity, UA Clear     EXT Glucose, UA Ngative     EXT Bilirubin, UA (Ref: Negative) Negative     EXT Ketones, UA (Ref: Negative) Negative     EXT Spec Grav, UA 1 020     EXT Blood, UA (Ref: Negative) Negative     EXT pH, UA 6 5     EXT Protein, UA (Ref: Negative) Trace     EXT Urobilinogen, UA (Ref: 0 2- 1 0) 0 2     EXT Leukocytes, UA (Ref: Negative) Negative     EXT Nitrite, UA (Ref: Negative) Negative    CBC and differential [[id number]]  (Normal) Collected:  10/31/17 1631    Lab Status:  Final result Specimen:  Blood from Arm, Right Updated:  10/31/17 1644     WBC 6 92 Thousand/uL      RBC 5 09 Million/uL      Hemoglobin 15 3 g/dL      Hematocrit 45 6 %      MCV 90 fL      MCH 30 1 pg      MCHC 33 6 g/dL      RDW 13 2 %      MPV 8 9 fL      Platelets 196 Thousands/uL      nRBC 0 /100 WBCs      Neutrophils Relative 65 %      Lymphocytes Relative 19 %      Monocytes Relative 9 %      Eosinophils Relative 6 %      Basophils Relative 1 %      Neutrophils Absolute 4 52 Thousands/µL      Lymphocytes Absolute 1 31 Thousands/µL      Monocytes Absolute 0 61 Thousand/µL      Eosinophils Absolute 0 41 Thousand/µL      Basophils Absolute 0 05 Thousands/µL                  CT abdomen pelvis with contrast   Final Result by Floyd Barrios MD (10/31 1830)   1  Bladder wall thickening could relate to cystitis underdistention rather than cystitis  2  Left-sided renal lesions which needs to be followed up with either nonemergent outpatient renal ultrasound or contrast enhanced abdominal MRI renal protocol  3  Nonobstructing subcentimeter bilateral renal calculi  Workstation performed: [id number]                    Procedures  Procedures       Phone Contacts  ED Phone Contact    ED Course  ED Course                                MDM  Number of Diagnoses or Management Options  Abdominal pain: new and requires workup     Amount and/or Complexity of Data Reviewed  Clinical lab tests: ordered and reviewed  Tests in the radiology section of CPT®: ordered and reviewed    Patient Progress  Patient progress: improved (Discussed with patient his labs and his CT scan result  Patient has had kidney stones in the past shows aware of them  Also has been told that he has the renal lesion for which he needs follow-up  Patient has scheduled for hernia repair on November 15th  Discussed with him calling in talking with his surgeon tomorrow and to arrange follow up with GI that could be gastritis or ulcers causing his issue or other etiology for pain   Discussed with him we will try symptomatic treatment and worsening signs and symptoms to return emergency department for )    CritCare Time    Disposition  Final diagnoses:   Abdominal pain     Time reflects when diagnosis was documented in both MDM as applicable and the Disposition within this note     Time User Action Codes Description Comment    10/31/2017  7:36 PM Hannah Cuevas, [address] [R10 9] Abdominal pain       ED Disposition     ED Disposition Condition Comment    Discharge  Jose Francisco Laureano discharge to home/self care  Condition at discharge: Good        Follow-up Information     Follow up With Specialties Details Why Contact Info    Rodrigue Jhaveri MD Internal Medicine   [address]  [address] [facility]  [address] [place] [address] [phone]      Anthony Marrero MD Gastroenterology Call in 1 day  [address]/[facility] [address]  [phone]          Patient's Medications   Discharge Prescriptions    DICYCLOMINE (BENTYL) 20 MG TABLET    Take 1 tablet by mouth every 6 (six) hours for 20 doses       Start Date: 10/31/2017End Date: 11/5/2017       Order Dose: 20 mg       Quantity: 20 tablet    Refills: 0    FAMOTIDINE (PEPCID) 20 MG TABLET    Take 1 tablet by mouth 2 (two) times a day for 30 days       Start Date: 10/31/2017End Date: 11/30/2017       Order Dose: 20 mg       Quantity: 60 tablet    Refills: 0    ONDANSETRON (ZOFRAN) 4 MG TABLET    Take 1 tablet by mouth every 8 (eight) hours as needed for nausea or vomiting for up to 7 days       Start Date: 10/31/2017End Date: 11/7/2017       Order Dose: 4 mg       Quantity: 12 tablet    Refills: 0     No discharge procedures on file      ED Provider  Electronically Signed by           Jase Polo MD  10/31/17 7649

## 2017-10-31 NOTE — DISCHARGE INSTRUCTIONS

## 2017-11-07 RX ORDER — SODIUM CHLORIDE, SODIUM LACTATE, POTASSIUM CHLORIDE, CALCIUM CHLORIDE 600; 310; 30; 20 MG/100ML; MG/100ML; MG/100ML; MG/100ML
100 INJECTION, SOLUTION INTRAVENOUS
Status: CANCELLED | OUTPATIENT
Start: 2017-11-07

## 2017-11-07 RX ORDER — CLINDAMYCIN PHOSPHATE 900 MG/50ML
900 INJECTION INTRAVENOUS
Status: CANCELLED | OUTPATIENT
Start: 2017-11-07

## 2017-11-29 NOTE — PRE-PROCEDURE INSTRUCTIONS
Pre-Surgery Instructions:   Medication Instructions    lisinopril (ZESTRIL) 10 mg tablet Patient was instructed by Physician and understands

## 2017-11-30 ENCOUNTER — APPOINTMENT (OUTPATIENT)
Dept: LAB | Facility: HOSPITAL | Age: 57
End: 2017-11-30
Attending: SURGERY
Payer: COMMERCIAL

## 2017-11-30 ENCOUNTER — TRANSCRIBE ORDERS (OUTPATIENT)
Dept: ADMINISTRATIVE | Facility: HOSPITAL | Age: 57
End: 2017-11-30

## 2017-11-30 DIAGNOSIS — K42.0 UMBILICAL HERNIA WITH OBSTRUCTION, WITHOUT GANGRENE: ICD-10-CM

## 2017-11-30 DIAGNOSIS — K42.0 UMBILICAL HERNIA WITH OBSTRUCTION: Primary | ICD-10-CM

## 2017-11-30 DIAGNOSIS — K42.0 UMBILICAL HERNIA WITH OBSTRUCTION: ICD-10-CM

## 2017-11-30 LAB
ATRIAL RATE: 68 BPM
ERYTHROCYTE [DISTWIDTH] IN BLOOD BY AUTOMATED COUNT: 13.1 % (ref 11.6–15.1)
HCT VFR BLD AUTO: 43.7 % (ref 36.5–49.3)
HGB BLD-MCNC: 14.7 G/DL (ref 12–17)
MCH RBC QN AUTO: 30 PG (ref 26.8–34.3)
MCHC RBC AUTO-ENTMCNC: 33.6 G/DL (ref 31.4–37.4)
MCV RBC AUTO: 89 FL (ref 82–98)
P AXIS: 29 DEGREES
PLATELET # BLD AUTO: 166 THOUSANDS/UL (ref 149–390)
PMV BLD AUTO: 9 FL (ref 8.9–12.7)
PR INTERVAL: 158 MS
QRS AXIS: -8 DEGREES
QRSD INTERVAL: 90 MS
QT INTERVAL: 396 MS
QTC INTERVAL: 421 MS
RBC # BLD AUTO: 4.9 MILLION/UL (ref 3.88–5.62)
T WAVE AXIS: 27 DEGREES
VENTRICULAR RATE: 68 BPM
WBC # BLD AUTO: 6.47 THOUSAND/UL (ref 4.31–10.16)

## 2017-11-30 PROCEDURE — 85027 COMPLETE CBC AUTOMATED: CPT

## 2017-11-30 PROCEDURE — 36415 COLL VENOUS BLD VENIPUNCTURE: CPT

## 2017-11-30 PROCEDURE — 93005 ELECTROCARDIOGRAM TRACING: CPT

## 2017-12-05 ENCOUNTER — ANESTHESIA EVENT (OUTPATIENT)
Dept: PERIOP | Facility: HOSPITAL | Age: 57
End: 2017-12-05
Payer: COMMERCIAL

## 2017-12-05 NOTE — ANESTHESIA PREPROCEDURE EVALUATION
Review of Systems/Medical History  Patient summary reviewed        Cardiovascular  EKG reviewed, Hypertension ,    Pulmonary  Smoker cigarette smoker , Tobacco cessation counseling given, ,        GI/Hepatic    GERD ,        Negative  ROS        Endo/Other  Negative endo/other ROS      GYN  Negative gynecology ROS          Hematology  Negative hematology ROS      Musculoskeletal  Negative musculoskeletal ROS        Neurology  Negative neurology ROS      Psychology   Negative psychology ROS            Physical Exam    Airway    Mallampati score: II  TM Distance: >3 FB  Neck ROM: full     Dental   upper dentures,     Cardiovascular  Cardiovascular exam normal    Pulmonary  Pulmonary exam normal     Other Findings        Anesthesia Plan  ASA Score- 2       Anesthesia Type- general with ASA Monitors  Additional Monitors:   Airway Plan: ETT  Induction- intravenous  Informed Consent- Anesthetic plan and risks discussed with patient  I personally reviewed this patient with the CRNA  Discussed and agreed on the Anesthesia Plan with the CRNA  Mili Calderon

## 2017-12-06 ENCOUNTER — HOSPITAL ENCOUNTER (OUTPATIENT)
Facility: HOSPITAL | Age: 57
Setting detail: OUTPATIENT SURGERY
Discharge: HOME | End: 2017-12-06
Attending: SURGERY | Admitting: SURGERY
Payer: COMMERCIAL

## 2017-12-06 ENCOUNTER — ANESTHESIA (OUTPATIENT)
Dept: PERIOP | Facility: HOSPITAL | Age: 57
End: 2017-12-06
Payer: COMMERCIAL

## 2017-12-06 VITALS
DIASTOLIC BLOOD PRESSURE: 87 MMHG | OXYGEN SATURATION: 94 % | WEIGHT: 269.18 LBS | SYSTOLIC BLOOD PRESSURE: 158 MMHG | TEMPERATURE: 97.6 F | RESPIRATION RATE: 21 BRPM | HEART RATE: 84 BPM | HEIGHT: 72 IN | BODY MASS INDEX: 36.46 KG/M2

## 2017-12-06 PROBLEM — K42.9 RECURRENT UMBILICAL HERNIA: Chronic | Status: ACTIVE | Noted: 2017-12-06

## 2017-12-06 PROCEDURE — C1781 MESH (IMPLANTABLE): HCPCS | Performed by: SURGERY

## 2017-12-06 DEVICE — VENTRALIGHT ST MESH
Type: IMPLANTABLE DEVICE | Site: UMBILICAL | Status: FUNCTIONAL
Brand: VENTRALIGHT ST

## 2017-12-06 RX ORDER — BUPIVACAINE HYDROCHLORIDE 2.5 MG/ML
INJECTION, SOLUTION EPIDURAL; INFILTRATION; INTRACAUDAL AS NEEDED
Status: DISCONTINUED | OUTPATIENT
Start: 2017-12-06 | End: 2017-12-06 | Stop reason: HOSPADM

## 2017-12-06 RX ORDER — ROCURONIUM BROMIDE 10 MG/ML
INJECTION, SOLUTION INTRAVENOUS AS NEEDED
Status: DISCONTINUED | OUTPATIENT
Start: 2017-12-06 | End: 2017-12-06 | Stop reason: SURG

## 2017-12-06 RX ORDER — MORPHINE SULFATE 2 MG/ML
2 INJECTION, SOLUTION INTRAMUSCULAR; INTRAVENOUS EVERY 2 HOUR PRN
Status: DISCONTINUED | OUTPATIENT
Start: 2017-12-06 | End: 2017-12-06 | Stop reason: HOSPADM

## 2017-12-06 RX ORDER — SODIUM CHLORIDE, SODIUM LACTATE, POTASSIUM CHLORIDE, CALCIUM CHLORIDE 600; 310; 30; 20 MG/100ML; MG/100ML; MG/100ML; MG/100ML
100 INJECTION, SOLUTION INTRAVENOUS CONTINUOUS
Status: DISCONTINUED | OUTPATIENT
Start: 2017-12-06 | End: 2017-12-06 | Stop reason: HOSPADM

## 2017-12-06 RX ORDER — MAGNESIUM HYDROXIDE 1200 MG/15ML
LIQUID ORAL AS NEEDED
Status: DISCONTINUED | OUTPATIENT
Start: 2017-12-06 | End: 2017-12-06 | Stop reason: HOSPADM

## 2017-12-06 RX ORDER — SUCCINYLCHOLINE CHLORIDE 20 MG/ML
INJECTION INTRAMUSCULAR; INTRAVENOUS AS NEEDED
Status: DISCONTINUED | OUTPATIENT
Start: 2017-12-06 | End: 2017-12-06 | Stop reason: SURG

## 2017-12-06 RX ORDER — PROPOFOL 10 MG/ML
INJECTION, EMULSION INTRAVENOUS AS NEEDED
Status: DISCONTINUED | OUTPATIENT
Start: 2017-12-06 | End: 2017-12-06 | Stop reason: SURG

## 2017-12-06 RX ORDER — CLINDAMYCIN PHOSPHATE 900 MG/50ML
900 INJECTION INTRAVENOUS ONCE
Status: COMPLETED | OUTPATIENT
Start: 2017-12-06 | End: 2017-12-06

## 2017-12-06 RX ORDER — ALBUTEROL SULFATE 90 UG/1
AEROSOL, METERED RESPIRATORY (INHALATION) AS NEEDED
Status: DISCONTINUED | OUTPATIENT
Start: 2017-12-06 | End: 2017-12-06 | Stop reason: SURG

## 2017-12-06 RX ORDER — ALBUTEROL SULFATE 2.5 MG/3ML
2.5 SOLUTION RESPIRATORY (INHALATION) ONCE
Status: COMPLETED | OUTPATIENT
Start: 2017-12-06 | End: 2017-12-06

## 2017-12-06 RX ORDER — MIDAZOLAM HYDROCHLORIDE 1 MG/ML
INJECTION INTRAMUSCULAR; INTRAVENOUS AS NEEDED
Status: DISCONTINUED | OUTPATIENT
Start: 2017-12-06 | End: 2017-12-06 | Stop reason: SURG

## 2017-12-06 RX ORDER — ACETAMINOPHEN AND CODEINE PHOSPHATE 300; 30 MG/1; MG/1
1 TABLET ORAL EVERY 6 HOURS PRN
Qty: 20 TABLET | Refills: 0 | Status: SHIPPED | OUTPATIENT
Start: 2017-12-06 | End: 2017-12-16

## 2017-12-06 RX ORDER — OXYCODONE HYDROCHLORIDE AND ACETAMINOPHEN 5; 325 MG/1; MG/1
1 TABLET ORAL EVERY 4 HOURS PRN
Status: DISCONTINUED | OUTPATIENT
Start: 2017-12-06 | End: 2017-12-06 | Stop reason: HOSPADM

## 2017-12-06 RX ORDER — ONDANSETRON 2 MG/ML
4 INJECTION INTRAMUSCULAR; INTRAVENOUS ONCE AS NEEDED
Status: DISCONTINUED | OUTPATIENT
Start: 2017-12-06 | End: 2017-12-06 | Stop reason: HOSPADM

## 2017-12-06 RX ORDER — ONDANSETRON 2 MG/ML
INJECTION INTRAMUSCULAR; INTRAVENOUS AS NEEDED
Status: DISCONTINUED | OUTPATIENT
Start: 2017-12-06 | End: 2017-12-06 | Stop reason: SURG

## 2017-12-06 RX ORDER — LIDOCAINE HYDROCHLORIDE 10 MG/ML
INJECTION, SOLUTION INFILTRATION; PERINEURAL AS NEEDED
Status: DISCONTINUED | OUTPATIENT
Start: 2017-12-06 | End: 2017-12-06 | Stop reason: SURG

## 2017-12-06 RX ORDER — OXYCODONE HYDROCHLORIDE AND ACETAMINOPHEN 5; 325 MG/1; MG/1
1 TABLET ORAL ONCE AS NEEDED
Status: COMPLETED | OUTPATIENT
Start: 2017-12-06 | End: 2017-12-06

## 2017-12-06 RX ORDER — ONDANSETRON 2 MG/ML
4 INJECTION INTRAMUSCULAR; INTRAVENOUS EVERY 6 HOURS PRN
Status: DISCONTINUED | OUTPATIENT
Start: 2017-12-06 | End: 2017-12-06 | Stop reason: HOSPADM

## 2017-12-06 RX ORDER — FENTANYL CITRATE 50 UG/ML
INJECTION, SOLUTION INTRAMUSCULAR; INTRAVENOUS AS NEEDED
Status: DISCONTINUED | OUTPATIENT
Start: 2017-12-06 | End: 2017-12-06 | Stop reason: SURG

## 2017-12-06 RX ORDER — FENTANYL CITRATE/PF 50 MCG/ML
50 SYRINGE (ML) INJECTION
Status: COMPLETED | OUTPATIENT
Start: 2017-12-06 | End: 2017-12-06

## 2017-12-06 RX ADMIN — ALBUTEROL SULFATE 2 PUFF: 90 AEROSOL, METERED RESPIRATORY (INHALATION) at 12:10

## 2017-12-06 RX ADMIN — LIDOCAINE HYDROCHLORIDE 50 MG: 10 INJECTION, SOLUTION INFILTRATION; PERINEURAL at 12:04

## 2017-12-06 RX ADMIN — FENTANYL CITRATE 50 MCG: 50 INJECTION INTRAMUSCULAR; INTRAVENOUS at 13:39

## 2017-12-06 RX ADMIN — ROCURONIUM BROMIDE 20 MG: 10 INJECTION INTRAVENOUS at 12:10

## 2017-12-06 RX ADMIN — ROCURONIUM BROMIDE 10 MG: 10 INJECTION INTRAVENOUS at 12:04

## 2017-12-06 RX ADMIN — SUGAMMADEX 300 MG: 100 INJECTION, SOLUTION INTRAVENOUS at 13:03

## 2017-12-06 RX ADMIN — FENTANYL CITRATE 50 MCG: 50 INJECTION INTRAMUSCULAR; INTRAVENOUS at 13:34

## 2017-12-06 RX ADMIN — ALBUTEROL SULFATE 4 PUFF: 90 AEROSOL, METERED RESPIRATORY (INHALATION) at 13:15

## 2017-12-06 RX ADMIN — FENTANYL CITRATE 25 MCG: 50 INJECTION, SOLUTION INTRAMUSCULAR; INTRAVENOUS at 13:15

## 2017-12-06 RX ADMIN — FENTANYL CITRATE 100 MCG: 50 INJECTION, SOLUTION INTRAMUSCULAR; INTRAVENOUS at 12:04

## 2017-12-06 RX ADMIN — CLINDAMYCIN PHOSPHATE 900 MG: 18 INJECTION, SOLUTION INTRAMUSCULAR; INTRAVENOUS at 12:05

## 2017-12-06 RX ADMIN — ROCURONIUM BROMIDE 20 MG: 10 INJECTION INTRAVENOUS at 12:38

## 2017-12-06 RX ADMIN — HYDROMORPHONE HYDROCHLORIDE 0.5 MG: 1 INJECTION, SOLUTION INTRAMUSCULAR; INTRAVENOUS; SUBCUTANEOUS at 14:13

## 2017-12-06 RX ADMIN — SUCCINYLCHOLINE CHLORIDE 140 MG: 20 INJECTION, SOLUTION INTRAMUSCULAR; INTRAVENOUS at 12:04

## 2017-12-06 RX ADMIN — ENOXAPARIN SODIUM 40 MG: 40 INJECTION SUBCUTANEOUS at 08:46

## 2017-12-06 RX ADMIN — SODIUM CHLORIDE, SODIUM LACTATE, POTASSIUM CHLORIDE, AND CALCIUM CHLORIDE: .6; .31; .03; .02 INJECTION, SOLUTION INTRAVENOUS at 13:10

## 2017-12-06 RX ADMIN — SODIUM CHLORIDE, SODIUM LACTATE, POTASSIUM CHLORIDE, AND CALCIUM CHLORIDE 100 ML/HR: .6; .31; .03; .02 INJECTION, SOLUTION INTRAVENOUS at 08:46

## 2017-12-06 RX ADMIN — PROPOFOL 200 MG: 10 INJECTION, EMULSION INTRAVENOUS at 12:04

## 2017-12-06 RX ADMIN — MIDAZOLAM 2 MG: 1 INJECTION INTRAMUSCULAR; INTRAVENOUS at 11:53

## 2017-12-06 RX ADMIN — OXYCODONE HYDROCHLORIDE AND ACETAMINOPHEN 1 TABLET: 5; 325 TABLET ORAL at 15:29

## 2017-12-06 RX ADMIN — ONDANSETRON 4 MG: 2 INJECTION INTRAMUSCULAR; INTRAVENOUS at 12:50

## 2017-12-06 RX ADMIN — ALBUTEROL SULFATE 2.5 MG: 2.5 SOLUTION RESPIRATORY (INHALATION) at 14:12

## 2017-12-06 RX ADMIN — SODIUM CHLORIDE, SODIUM LACTATE, POTASSIUM CHLORIDE, AND CALCIUM CHLORIDE: .6; .31; .03; .02 INJECTION, SOLUTION INTRAVENOUS at 11:40

## 2017-12-06 RX ADMIN — HYDROMORPHONE HYDROCHLORIDE 0.5 MG: 1 INJECTION, SOLUTION INTRAMUSCULAR; INTRAVENOUS; SUBCUTANEOUS at 13:56

## 2017-12-06 RX ADMIN — HYDROMORPHONE HYDROCHLORIDE 0.5 MG: 1 INJECTION, SOLUTION INTRAMUSCULAR; INTRAVENOUS; SUBCUTANEOUS at 13:45

## 2017-12-06 RX ADMIN — OXYCODONE HYDROCHLORIDE AND ACETAMINOPHEN 1 TABLET: 5; 325 TABLET ORAL at 14:44

## 2017-12-06 NOTE — PERIOPERATIVE NURSING NOTE
Patient remains 5/10 pain  Patient ambulated around unit  Patient reports slight improvement in pain level  Will continue to encourage IS  Additional percocet order received from S Corinne Lemma, PA-C  Will administer and continue to monitor

## 2017-12-06 NOTE — PROGRESS NOTES
Carmela ALVARADO and Latia ALVARADO at bedside to evaluate patient  Patient having pain and is not taking deep breaths due to pain  Patient given incentive spirometer and taught how to use  Will continue to monitor pain and oxygen saturation, if no improvement patient will be admitted as per Latia ALVARADO

## 2017-12-06 NOTE — ANESTHESIA POSTPROCEDURE EVALUATION
Post-Op Assessment Note      CV Status:  Stable    Mental Status:  Alert    Hydration Status:  Stable and euvolemic    PONV Controlled:  None    Post Op Vitals Reviewed: Yes          Staff: CRNA       Comments: vss          BP  170/90   Temp (P) 97 7 °F (36 5 °C) (12/06/17 1326)    Pulse  83   Resp (!) (P) 23 (12/06/17 1326)    SpO2   92

## 2017-12-06 NOTE — DISCHARGE INSTRUCTIONS
Umbilical Hernia   WHAT YOU NEED TO KNOW:   An umbilical hernia is a bulge through the abdominal muscles in the area of the navel (belly button)  The hernia may contain tissue from the abdomen, part of an organ (such as the intestine), or fluid  Umbilical hernias usually happen because of a hole or a weak area in the muscles of the abdominal wall  DISCHARGE INSTRUCTIONS:   Medicines:   · Ibuprofen or acetaminophen:  These medicines decrease pain  They are available without a doctor's order  Ask your healthcare provider which medicine is right for you  Ask how much to take and how often to take it  Follow directions  These medicines can cause stomach bleeding if not taken correctly  Ibuprofen can cause kidney damage  Do not take ibuprofen if you have kidney disease, an ulcer, or allergies to aspirin  Acetaminophen can cause liver damage  Do not drink alcohol if you take acetaminophen  · Take your medicine as directed  Contact your healthcare provider if you think your medicine is not helping or if you have side effects  Tell him or her if you are allergic to any medicine  Keep a list of the medicines, vitamins, and herbs you take  Include the amounts, and when and why you take them  Bring the list or the pill bottles to follow-up visits  Carry your medicine list with you in case of an emergency  Follow up with your healthcare provider as directed:  Write down your questions so you remember to ask them during your visits  Self care:   · Activity:  Avoid lifting, bending, and straining  Try not to stand for long periods of time  If you have to cough, try to cough gently  Support the hernia by holding your hand or a pillow over it when coughing  Ask your if it is okay for you to have sexual intercourse  · Prevent constipation:  Straining to have a bowel movement may make your hernia worse  Eat foods that are high in fiber and drink at least 8 glasses of water a day   A high-fiber diet includes whole grains, bran, cereals, and uncooked fruit and vegetables  Walking or other exercise can also help  Your healthcare provider may give you fiber medicine or a stool softener to help make your bowel movements softer and more regular  Do not use an enema or a laxative unless your healthcare provider says it is okay  · What to wear:  Do not wear anything tight over your hernia  Ask your healthcare provider if you should wear a support belt or girdle to keep the hernia in place  · Ask your healthcare provider how to reduce your hernia:  Sometimes your hernia will slip back into your abdomen if you lie flat for a while  Ask your healthcare provider if you should try to gently push your hernia back into place if lying flat does not work  If your hernia does not slide back into your abdomen easily, stop pushing on it and call your healthcare provider  Do not try to push the hernia back into place if it is painful or tender  Contact your healthcare provider if:   · You have nausea or vomiting  · You cannot gently push your hernia back into your abdomen  (Do this only if your healthcare provider has shown you how to do it )     · You are constipated or have blood in your bowel movements  · Your hernia is getting bigger  · You have questions or concerns about your condition or care  Seek care immediately or call 911 if:   · You have a fever  · Your hernia is stuck outside your abdomen and is painful, swollen, or feels hard  · You completely stop having bowel movements and stop passing gas  · Your abdominal pain is bad or getting worse  © 2017 [employer] Information is for End User's use only and may not be sold, redistributed or otherwise used for commercial purposes  All illustrations and images included in CareNotes® are the copyrighted property of [employer] , [employer]  or Gaurav Mehta  The above information is an  only   It is not intended as medical advice for individual conditions or treatments  Talk to your doctor, nurse or pharmacist before following any medical regimen to see if it is safe and effective for you        No diet restriction for this surgery  May shower every day  Remove dressings in 3 days  Remove strips in 7 days  Call office to make an appointment in 2 weeks  Call office with any issues regarding the surgery  No driving, heavy lifting or strenuous exercise for one week  Resume home medications  Apply ice to the incisions  May take Tylenol 3, regular Tylenol or ibuprofen for pain

## 2017-12-06 NOTE — ANESTHESIA POSTPROCEDURE EVALUATION
Post-Op Assessment Note      CV Status:  Stable    Mental Status:  Alert    Hydration Status:  Euvolemic and stable    PONV Controlled:  None    Airway Patency:  Patent    Post Op Vitals Reviewed: Yes          Staff: CRNA       Comments: vss          BP  170/92   Temp (P) 97 7 °F (36 5 °C) (12/06/17 1326)    Pulse  85   Resp (!) (P) 23 (12/06/17 1326)    SpO2   91

## 2017-12-06 NOTE — OP NOTE
OPERATIVE REPORT  PATIENT NAME: Christina Tinajero    :  1960  MRN: [id number]  Pt Location: [facility] 04    SURGERY DATE: 2017    Surgeon(s) and Role:     * Glennette Fabry, PA-C - Assisting     * Francia Duncan MD - Primary    Preop Diagnosis:  Recurrent umbilical hernia with incarceration [K42 0]    Post-Op Diagnosis Codes:     * Recurrent umbilical hernia with incarceration [K42 0]    Procedure(s) (LRB):  LAPAROSCOPIC UMBILICAL HERNIA REPAIR WITH MESH (N/A)    Specimen(s):  * No specimens in log *    Estimated Blood Loss:   Minimal    Drains:       Anesthesia Type:   General    Operative Indications:  Recurrent umbilical hernia with incarceration [K430]  51-year-old male who noted a painful bulge from the umbilicus  He had repair of the umbilical hernia 4981  On examination the patient had recurrence of the umbilical hernia  The patient was advised to undergo laparoscopic umbilical hernia repair with mesh  Operative Findings:  The patient had recurrence of the umbilical hernia with incarceration of preperitoneal fat  The defect measured approximately 1 5 cm  The old mesh was removed  Complications:   None    Procedure and Technique:  The patient was identified and then he was taken to the operating room and placed in the operating table in a supine position  After adequate anesthesia induction and satisfactory endotracheal intubation the abdomen was prepped and draped under sterile usual fashion with Chloraprep  Timeout was called the patient was identified as well as the surgical site  The abdominal wall was elevated with towel clips, the Veress needle was introduced through the umbilicus after the omentum was reduced  The abdomen was insufflated with C02  After obtaining adequate pneumoperitoneum 5 mm trocar was placed on the right upper quadrant and the scope was advanced  Exploration of the abdomen was performed with the above findings   11 mm trocar was placed on the right lower quadrant under direct vision  5 mm trocar was placed on the right side of abdomen under direct vision  At this point I proceeded to dissect the hernia sac with the dissected and cautery  The old mesh was removed with cautery  11 cm circular ventral light mesh was placed and tacked to the anterior abdominal wall with 0 Nurolon using transfascial U stitch fashion at the 6 and 12:00 position  The rest of the mesh edge was tacked up to the anterior abdominal wall with SorbaFix  The old mesh was removed with the help of the Endo-Catch via the 11 mm trocar site  The ports were removed under direct vision without evidence of bleeding from the abdominal wall  The 11 mm trocar site was closed with 0 Vicryl in an interrupted figure-of-eight fashion  The subcutaneous tissue was infiltrated with 0 25% of Marcaine and the skin was closed with 4-0 Vicryl in a continuous subcuticular fashion  Sterile dressings were applied  At the end of the case instrument, needles and sponges counts were correct  The patient tolerated the procedure well and then he was transferred to recovery room in a stable conditions  The physician assistant was crucial for the entrance into the abdominal cavity, dissection of the hernia sac, placement of a mesh and closure of the abdominal wall     I was present for the entire procedure and A qualified resident physician was not available    Patient Disposition:  PACU     SIGNATURE: Katie Hsieh MD  DATE: December 6, 2017  TIME: 1:06 PM

## 2017-12-11 ENCOUNTER — ALLSCRIPTS OFFICE VISIT (OUTPATIENT)
Dept: OTHER | Facility: OTHER | Age: 57
End: 2017-12-11

## 2017-12-12 NOTE — PROGRESS NOTES
Assessment    1  History of Recurrent umbilical hernia with incarceration (552 1) (K42 0)   2  History of Laparoscopy Repair Of Umbilical Hernia    Plan  PMH: Laparoscopy Repair Of Umbilical Hernia, PMH: Recurrent umbilical hernia withincarceration    · Follow-up visit in 1 week Evaluation and Treatment  Follow-up  Status: Hold For -Scheduling  Requested for: 55Cou0099   Ordered;PMH: Laparoscopy Repair Of Umbilical Hernia, PMH: Recurrent umbilical hernia with incarceration; Ordered By: Nicanor Gross Performed:  Due: 37YYV6800  PMH: Recurrent umbilical hernia with incarceration    · Acetaminophen-Codeine #3 300-30 MG Oral Tablet   Rx By: Hernesto Helms; Dispense: 5 Days ; #:20 Tablet; Refill: 0;PMH: Recurrent umbilical hernia with incarceration; CHRISTIAN = N; Call Rx; Last Updated By: Nicanor Gross; 12/11/2017 8:47:29 AM   · OxyCODONE HCl - 10 MG Oral Tablet; Take 1-2 tablets every 4-6 hours PRN forpain   Rx By: Nicanor Gross; Dispense: 0 Days ; #:50 Tablet; Refill: 0;PMH: Recurrent umbilical hernia with incarceration; CHRISTIAN = N; Print Rx    Discussion/Summary    Patient is status post laparoscopic umbilical hernia repair  Reviewing the operative note it was a complex procedure on a recurrence requiring takedown of previous mesh as well as trans fascial sutures securing of the mesh and multiple tacks  I would therefore anticipate the patient to have a significant amount of postoperative pain  Lacking any other symptoms such as fevers chills and able to tolerate diet I have low suspicion of intra-abdominal injury  All the incision sites have no evidence of infectious process that could also similarly contribute to pain  I have told him he is safe to take 2 of the oxycodone every 4 hours, and provide him with more pain meds until follow-up with Dr Sayra Kaminski scheduled next week   Should his pain increase in intensity and be associated with other concerning symptoms such as obstipation and the inability to tolerate oral intake as well as fevers or chills or change in the incisions I recommend he seek evaluation promptly  Chief Complaint  Post op umbilical hernia      Post-Op  HPI: Patient is status post laparoscopic recurrent umbilical hernia repair with mesh by Dr Ashley Garcia last Wednesday  Patient was initially sent home with Tylenol #3 for pain control  Was called by the patient on Friday regarding insufficient pain control  Patient was able to acquire stronger narcotic from Dr Saroj Norman that has had some improvement but still the patient feels the pain is intense  He is currently taking 1 oxycodone of unknown milligrams every 4 hours  Denies nausea or vomiting  Had not had a bowel movement until this morning is currently on stool softener and laxative  No fevers or chills, no incisional complaints  Review of Systems   Constitutional: feeling poorly, but-- no fever-- and-- no chills  Gastrointestinal: abdominal pain-- and-- constipation, but-- no nausea,-- no vomiting-- and-- no diarrhea  Integumentary: skin wound  Active Problems  1  Abdominal pain (789 00) (R10 9)   2  Elevated blood sugar (790 29) (R73 9)   3  Hyperlipidemia (272 4) (E78 5)   4  Hypertension, benign (401 1) (I10)   5  Knee pain, acute, right (719 46) (M25 561)   6  Need for influenza vaccination (V04 81) (Z23)   7  Obesity (278 00) (E66 9)   8  Screening for colon cancer (V76 51) (Z12 11)    Past Medical History  Active Problems And Past Medical History Reviewed: The active problems and past medical history were reviewed and updated today  Surgical History  Surgical History Reviewed: The surgical history was reviewed and updated today  Social History     · Current every day smoker (305 1) (F17 200)  The social history was reviewed and updated today  The social history was reviewed and is unchanged  Current Meds   1   Acetaminophen-Codeine #3 300-30 MG Oral Tablet; TAKE 1 TABLET EVERY 6 HOURS AS NEEDED FOR PAIN; Therapy: 23EIQ5845 to (Evaluate:57Yxd4957); Last Rx:14Nfo3134 Ordered   2  Cefuroxime Axetil 500 MG Oral Tablet; TAKE 1 TABLET EVERY 12 HOURS DAILY; Therapy: 20QQZ5351 to [phone])  Requested for: 46NDB7100; Last Rx:16Mpv8008 Ordered   3  Lisinopril-Hydrochlorothiazide 20-12 5 MG Oral Tablet; TAKE 1 TABLET DAILY; Therapy: 08BVF4291 to (Evaluate:96Qil8996)  Requested for: 96KIR7546; Last Rx:37Yyy9162 Ordered   4  Omeprazole 20 MG Oral Capsule Delayed Release; TAKE 1 CAPSULE Daily after supper; Therapy: 00Quq3560 to (Evaluate:54Sje8450)  Requested for: 42Wht2098; Last Rx:78Ixb6438 Ordered    Allergies    1  Ceclor    Physical Exam   Constitutional  General appearance: No acute distress, well appearing and well nourished  Future Appointments    Date/Time Provider Specialty Site   12/20/2017 01:15 PM Lori Talley, [address] [place] [address]       Signatures   Electronically signed by :  Enmanuel Carias MD; Dec 11 2017 12:09PM EST                       (Author)

## 2017-12-21 ENCOUNTER — ALLSCRIPTS OFFICE VISIT (OUTPATIENT)
Dept: OTHER | Facility: OTHER | Age: 57
End: 2017-12-21

## 2018-01-09 ENCOUNTER — ALLSCRIPTS OFFICE VISIT (OUTPATIENT)
Dept: OTHER | Facility: OTHER | Age: 58
End: 2018-01-09

## 2018-01-09 ENCOUNTER — GENERIC CONVERSION - ENCOUNTER (OUTPATIENT)
Dept: OTHER | Facility: OTHER | Age: 58
End: 2018-01-09

## 2018-01-12 VITALS
TEMPERATURE: 98.8 F | HEART RATE: 90 BPM | WEIGHT: 274 LBS | BODY MASS INDEX: 37.11 KG/M2 | SYSTOLIC BLOOD PRESSURE: 138 MMHG | DIASTOLIC BLOOD PRESSURE: 94 MMHG | HEIGHT: 72 IN | OXYGEN SATURATION: 96 %

## 2018-01-13 VITALS
BODY MASS INDEX: 36.71 KG/M2 | HEIGHT: 73 IN | TEMPERATURE: 98 F | SYSTOLIC BLOOD PRESSURE: 140 MMHG | WEIGHT: 277 LBS | DIASTOLIC BLOOD PRESSURE: 90 MMHG

## 2018-01-13 VITALS
DIASTOLIC BLOOD PRESSURE: 76 MMHG | TEMPERATURE: 97.7 F | OXYGEN SATURATION: 97 % | SYSTOLIC BLOOD PRESSURE: 120 MMHG | WEIGHT: 285 LBS | HEIGHT: 73 IN | BODY MASS INDEX: 37.77 KG/M2

## 2018-01-14 NOTE — MISCELLANEOUS
Provider Comments  Provider Comments:   PATIENT NO SHOWED      Signatures   Electronically signed by : Deirdre Sandhu [facility]; Feb 5 2016  4:34PM EST                       (Author)    Electronically signed by : Nam Reilly MD; Feb 5 2016  4:50PM EST

## 2018-01-16 NOTE — MISCELLANEOUS
Message  Return to work or school:   Salas Seth is under my professional care  He was seen in my office on 10/2/17   He is able to return to work on  10/8/17      Please excuse Nikki Lopez from work 9/25/17 through 10/7/17 due to an acute illness  Kelsy Islas PA-C  Past Medical History  Active Problems And Past Medical History Reviewed: The active problems and past medical history were reviewed and updated today  Family History  Family History Reviewed: The family history was reviewed and updated today  Surgical History  Surgical History Reviewed: The surgical history was reviewed and updated today         Future Appointments    Signatures   Electronically signed by : Charles Beckman, Andrés Torres; Oct  2 2017  3:47PM EST                       (Author)    Electronically signed by : Valdez Coppola MD; Oct  2 2017  4:01PM EST

## 2018-01-16 NOTE — RESULT NOTES
Verified Results  (1) CBC/PLT/DIFF 79Irm2473 11:37AM [facility] Order Number: [id number]     Test Name Result Flag Reference   WBC COUNT 7 13 Thousand/uL  4 31-10 16   RBC COUNT 5 37 Million/uL  3 88-5 62   HEMOGLOBIN 16 2 g/dL  12 0-17 0   HEMATOCRIT 46 4 %  36 5-49 3   MCV 86 fL  82-98   MCH 30 2 pg  26 8-34 3   MCHC 34 9 g/dL  31 4-37 4   RDW 12 3 %  11 6-15 1   MPV 8 5 fL L 8 9-12 7   PLATELET COUNT 743 Thousands/uL  149-390   nRBC AUTOMATED 0 /100 WBCs     NEUTROPHILS RELATIVE PERCENT 61 %  43-75   LYMPHOCYTES RELATIVE PERCENT 22 %  14-44   MONOCYTES RELATIVE PERCENT 8 %  4-12   EOSINOPHILS RELATIVE PERCENT 8 % H 0-6   BASOPHILS RELATIVE PERCENT 1 %  0-1   NEUTROPHILS ABSOLUTE COUNT 4 38 Thousands/? ??L  1 85-7 62   LYMPHOCYTES ABSOLUTE COUNT 1 54 Thousands/? ??L  0 60-4 47   MONOCYTES ABSOLUTE COUNT 0 54 Thousand/? ??L  0 17-1 22   EOSINOPHILS ABSOLUTE COUNT 0 55 Thousand/? ??L  0 00-0 61   BASOPHILS ABSOLUTE COUNT 0 07 Thousands/? ??L  0 00-0 10   - Patient Instructions: This bloodwork is non-fasting  Please drink two glasses of water morning of bloodwork  (1) COMPREHENSIVE METABOLIC PANEL 57ROB4416 81:89NK Laure Millard Order Number: [id number]     Test Name Result Flag Reference   SODIUM 140 mmol/L  136-145   POTASSIUM 3 9 mmol/L  3 5-5 3   CHLORIDE 103 mmol/L  100-108   CARBON DIOXIDE 28 mmol/L  21-32   ANION GAP (CALC) 9 mmol/L  4-13   BLOOD UREA NITROGEN 17 mg/dL  5-25   CREATININE 1 24 mg/dL  0 60-1 30   Standardized to IDMS reference method   CALCIUM 9 5 mg/dL  8 3-10 1   BILI, TOTAL 0 40 mg/dL  0 20-1 00   ALK PHOSPHATAS 68 U/L     ALT (SGPT) 24 U/L  12-78   Specimen collection should occur prior to Sulfasalazine administration due to the potential for falsely depressed results  AST(SGOT) 12 U/L  5-45   Specimen collection should occur prior to Sulfasalazine administration due to the potential for falsely depressed results     ALBUMIN 3 6 g/dL  3 5-5 0   TOTAL PROTEIN 8 0 g/dL  6 4-8 2   eGFR 65 ml/min/1 73sq m     National Kidney Disease Education Program recommendations are as follows:  GFR calculation is accurate only with a steady state creatinine  Chronic Kidney disease less than 60 ml/min/1 73 sq  meters  Kidney failure less than 15 ml/min/1 73 sq  meters  GLUCOSE FASTING 116 mg/dL H 65-99   Specimen collection should occur prior to Sulfasalazine administration due to the potential for falsely depressed results  Specimen collection should occur prior to Sulfapyridine administration due to the potential for falsely elevated results  (1) LIPID PANEL FASTING W DIRECT LDL REFLEX 42Gyd2326 11:37AM [facility] Order Number: [id number]     Test Name Result Flag Reference   CHOLESTEROL 166 mg/dL     LDL CHOLESTEROL CALCULATED 91 mg/dL  0-100   - Patient Instructions: This is a fasting blood test  Water, black tea or black coffee only after 9:00pm the night before test   Drink 2 glasses of water the morning of test       Triglyceride:        Normal <150 mg/dl   Borderline High 150-199 mg/dl   High 200-499 mg/dl   Very High >499 mg/dl      Cholesterol:       Desirable <200 mg/dl    Borderline High 200-239 mg/dl    High >239 mg/dl      HDL Cholesterol:       High>59 mg/dL    Low <41 mg/dL      HDL Cholesterol:       High>59 mg/dL    Low <41 mg/dL      This screening LDL is a calculated result  It does not have the accuracy of the Direct Measured LDL in the monitoring of patients with hyperlipidemia and/or statin therapy  Direct Measure LDL (LRP905) must be ordered separately in these patients  TRIGLYCERIDES 230 mg/dL H <=150   Specimen collection should occur prior to N-Acetylcysteine or Metamizole administration due to the potential for falsely depressed results  HDL,DIRECT 29 mg/dL L 40-60   Specimen collection should occur prior to Metamizole administration due to the potential for falsley depressed results

## 2018-01-18 ENCOUNTER — GENERIC CONVERSION - ENCOUNTER (OUTPATIENT)
Dept: OTHER | Facility: OTHER | Age: 58
End: 2018-01-18

## 2018-01-18 ENCOUNTER — HOSPITAL ENCOUNTER (OUTPATIENT)
Dept: CT IMAGING | Facility: HOSPITAL | Age: 58
Discharge: HOME | End: 2018-01-18
Attending: SURGERY
Payer: COMMERCIAL

## 2018-01-18 ENCOUNTER — TRANSCRIBE ORDERS (OUTPATIENT)
Dept: ADMINISTRATIVE | Facility: HOSPITAL | Age: 58
End: 2018-01-18

## 2018-01-18 DIAGNOSIS — R10.9 ABDOMINAL PAIN: ICD-10-CM

## 2018-01-18 DIAGNOSIS — R10.9 ABDOMINAL PAIN, UNSPECIFIED ABDOMINAL LOCATION: ICD-10-CM

## 2018-01-18 DIAGNOSIS — R10.9 ABDOMINAL PAIN, UNSPECIFIED ABDOMINAL LOCATION: Primary | ICD-10-CM

## 2018-01-18 PROCEDURE — 74177 CT ABD & PELVIS W/CONTRAST: CPT

## 2018-01-18 RX ADMIN — IOHEXOL 100 ML: 350 INJECTION, SOLUTION INTRAVENOUS at 15:27

## 2018-01-22 VITALS
DIASTOLIC BLOOD PRESSURE: 100 MMHG | BODY MASS INDEX: 37.65 KG/M2 | TEMPERATURE: 98 F | HEIGHT: 72 IN | SYSTOLIC BLOOD PRESSURE: 150 MMHG | WEIGHT: 278 LBS

## 2018-01-22 VITALS
OXYGEN SATURATION: 99 % | HEIGHT: 72 IN | TEMPERATURE: 98 F | BODY MASS INDEX: 33.46 KG/M2 | HEART RATE: 80 BPM | SYSTOLIC BLOOD PRESSURE: 145 MMHG | WEIGHT: 247 LBS | DIASTOLIC BLOOD PRESSURE: 92 MMHG

## 2018-01-23 VITALS — DIASTOLIC BLOOD PRESSURE: 110 MMHG | HEIGHT: 72 IN | TEMPERATURE: 98.3 F | SYSTOLIC BLOOD PRESSURE: 150 MMHG

## 2018-01-23 NOTE — MISCELLANEOUS
Message  Return to work or school:        Patient is not cleared to return  Return to work date will be determined in 2 weeks on 1/4/2018  Breanna Jaeger        Signatures   Electronically signed by : Johanna Oliva, ; Dec 21 2017  1:49PM EST                       (Author)

## 2018-01-23 NOTE — MISCELLANEOUS
Message  Called pt in regards to his oxycodone rx that we are working on lmom      Active Problems    1  Abdominal pain (789 00) (R10 9)   2  Elevated blood sugar (790 29) (R73 9)   3  Hyperlipidemia (272 4) (E78 5)   4  Hypertension, benign (401 1) (I10)   5  Knee pain, acute, right (719 46) (M25 561)   6  Need for influenza vaccination (V04 81) (Z23)   7  Obesity (278 00) (E66 9)   8  Recurrent umbilical hernia with incarceration (552 1) (K42 0)   9  Screening for colon cancer (V76 51) (Z12 11)    Current Meds   1  Acetaminophen-Codeine #3 300-30 MG Oral Tablet; TAKE 1 TABLET EVERY 6 HOURS   AS NEEDED FOR PAIN;   Therapy: 29MGS7386 to (Evaluate:75Any4608); Last Rx:04Tmd6154 Ordered   2  Cefuroxime Axetil 500 MG Oral Tablet; TAKE 1 TABLET EVERY 12 HOURS DAILY; Therapy: 34OHD5236 to (([phone] [phone])  Requested for: 00QAC9299; Last   Rx:19Wul6411 Ordered   3  Lisinopril-Hydrochlorothiazide 20-12 5 MG Oral Tablet; TAKE 1 TABLET DAILY; Therapy: 57VBL5809 to (Evaluate:24Rwh8820)  Requested for: 47IUJ2003; Last   Rx:33Mdt5873 Ordered   4  Omeprazole 20 MG Oral Capsule Delayed Release; TAKE 1 CAPSULE Daily after   supper; Therapy: 49Iko0688 to (Evaluate:82Ujg0280)  Requested for: 81Joa8562; Last   Rx:84Fcg6215 Ordered    Allergies    1  Ceclor    Plan  Recurrent umbilical hernia with incarceration    · Acetaminophen-Codeine #3 300-30 MG Oral Tablet   · OxyCODONE HCl - 10 MG Oral Tablet;  Take 1-2 tablets every 4-6 hours PRN for  pain    Signatures   Electronically signed by : Mayito Rodriguez, ; Dec 11 2017 10:15AM EST                       (Author)

## 2018-01-24 VITALS
SYSTOLIC BLOOD PRESSURE: 128 MMHG | HEIGHT: 72 IN | BODY MASS INDEX: 35.42 KG/M2 | WEIGHT: 261.5 LBS | TEMPERATURE: 97.7 F | DIASTOLIC BLOOD PRESSURE: 84 MMHG

## 2018-01-24 VITALS
BODY MASS INDEX: 35.5 KG/M2 | TEMPERATURE: 98.6 F | SYSTOLIC BLOOD PRESSURE: 150 MMHG | HEIGHT: 72 IN | WEIGHT: 262.13 LBS | DIASTOLIC BLOOD PRESSURE: 110 MMHG

## 2018-02-01 ENCOUNTER — OFFICE VISIT (OUTPATIENT)
Dept: SURGERY | Facility: CLINIC | Age: 58
End: 2018-02-01

## 2018-02-01 VITALS
HEIGHT: 72 IN | DIASTOLIC BLOOD PRESSURE: 92 MMHG | HEART RATE: 72 BPM | SYSTOLIC BLOOD PRESSURE: 142 MMHG | TEMPERATURE: 97.9 F | BODY MASS INDEX: 36.08 KG/M2 | WEIGHT: 266.4 LBS | RESPIRATION RATE: 15 BRPM

## 2018-02-01 DIAGNOSIS — Z48.89 POSTOPERATIVE VISIT: Primary | ICD-10-CM

## 2018-02-01 PROCEDURE — 99024 POSTOP FOLLOW-UP VISIT: CPT | Performed by: SURGERY

## 2018-02-01 NOTE — PROGRESS NOTES
Assessment/Plan:  Status post laparoscopic umbilical hernia repair with mesh  The patient still complains of pain to the left of the umbilicus, very painful  The area was injected with local anesthetic with complete resolution of the pain  This indicates that the patient has a pinched nerve  The patient will return to my office in 2 weeks for follow-up  There are no diagnoses linked to this encounter  Subjective:     Patient ID: May Conte is a 62 y o  male  Was still complains of pain from the left side of the umbilicus, exacerbated by standing or sitting up  Improved with walking  HPI    Review of Systems      Objective:     Physical Exam  on examination, the abdomen is soft, nondistended and nontender  Surgical wounds are well-healed  There is no evidence of recurrence of the hernia  1% lidocaine was infiltrated over the left side of the abdomen and the pain went away

## 2018-02-15 ENCOUNTER — OFFICE VISIT (OUTPATIENT)
Dept: SURGERY | Facility: CLINIC | Age: 58
End: 2018-02-15

## 2018-02-15 VITALS
SYSTOLIC BLOOD PRESSURE: 148 MMHG | BODY MASS INDEX: 37.53 KG/M2 | HEIGHT: 72 IN | TEMPERATURE: 98.3 F | HEART RATE: 76 BPM | WEIGHT: 277.08 LBS | RESPIRATION RATE: 14 BRPM | DIASTOLIC BLOOD PRESSURE: 86 MMHG

## 2018-02-15 DIAGNOSIS — R10.84 GENERALIZED ABDOMINAL PAIN: Primary | ICD-10-CM

## 2018-02-15 PROCEDURE — 99024 POSTOP FOLLOW-UP VISIT: CPT | Performed by: SURGERY

## 2018-02-15 RX ORDER — SODIUM CHLORIDE, SODIUM LACTATE, POTASSIUM CHLORIDE, CALCIUM CHLORIDE 600; 310; 30; 20 MG/100ML; MG/100ML; MG/100ML; MG/100ML
125 INJECTION, SOLUTION INTRAVENOUS CONTINUOUS
Qty: 250 ML | Refills: 0 | Status: SHIPPED | OUTPATIENT
Start: 2018-02-15 | End: 2018-03-13 | Stop reason: ALTCHOICE

## 2018-02-15 NOTE — PROGRESS NOTES
Follow-up- General Surgery   Yusra Schofield 62 y o  male MRN: [id number]  Unit/Bed#:  Encounter: [id number]    Assessment/Plan     Assessment:  Persistent abdominal pain, not improving despite medical treatment  Status post laparoscopic umbilical hernia repair with mesh  Plan:  I advised the patient to undergo diagnostic laparoscopy, possible lysis of adhesions, possible steroid injection of the abdominal wall nerves  History of Present Illness     HPI:  I had the pleasure of seeing Yusra Schofield in the office today accompanied by his wife for follow-up  The patient is a pleasant 62 y o  male who had laparoscopic umbilical hernia repair couple months ago, the patient has been complaining of persistent abdominal pain around the umbilicus without improvement of the symptoms despite medical management and lidocaine injection  He denies having any nausea, vomiting, diarrhea, constipation or any other constitutional symptoms  Review of Systems   Constitutional: Negative for chills and fever  HENT: Negative for nosebleeds and sore throat  Eyes: Negative for pain and discharge  Respiratory: Negative for cough and shortness of breath  Cardiovascular: Negative for chest pain and palpitations  Gastrointestinal:        As per history of present illness  Endocrine: Negative for cold intolerance and heat intolerance  Genitourinary: Negative for dysuria and hematuria  Musculoskeletal: Negative for arthralgias and joint swelling  Neurological: Negative for seizures and headaches  Hematological: Negative for adenopathy  Does not bruise/bleed easily  Psychiatric/Behavioral: Negative for confusion  The patient is not nervous/anxious          Historical Information   Past Medical History:   Diagnosis Date    Hypertension     Kidney stone      Past Surgical History:   Procedure Laterality Date    CYSTOSCOPY W/ LASER LITHOTRIPSY      HERNIA REPAIR      HIP DEBRIDEMENT Right     LA LAP, VENTRAL HERNIA REPAIR,REDUCIBLE N/A 12/6/2017    Procedure: LAPAROSCOPIC UMBILICAL HERNIA REPAIR WITH MESH;  Surgeon: Leonardo Leon MD;  Location: [facility];  Service: General     Social History   History   Alcohol Use    Yes     Comment: social     History   Drug Use    Types: Marijuana     History   Smoking Status    Current Every Day Smoker    Packs/day: 0 20    Types: Cigarettes   Smokeless Tobacco    Never Used     Family History:   Family History   Problem Relation Age of Onset    Hypertension Father     Cancer Father        Meds/Allergies   PTA meds:   Cannot display prior to admission medications because the patient has not been admitted in this contact  Allergies   Allergen Reactions    Ceclor [Cefaclor] Rash       Objective   First Vitals:   @VSFIRST2(5,8,6,7,9,11,14,10:FIRST)@    Current Vitals:   Blood Pressure: 148/86 (02/15/18 1503)  Pulse: 76 (02/15/18 1503)  Temperature: 98 3 °F (36 8 °C) (02/15/18 1503)  Temp Source: Oral (02/15/18 1503)  Respirations: 14 (02/15/18 1503)  Height: 6' (182 9 cm) (02/15/18 1503)  Weight - Scale: 126 kg (277 lb 1 3 oz) (02/15/18 1503)    [unfilled]    Invasive Devices     Peripheral Intravenous Line            Peripheral IV 01/18/18 Right Antecubital 27 days                Physical Exam   Constitutional: He is oriented to person, place, and time  He appears well-developed and well-nourished  No distress  HENT:   Head: Normocephalic  Mouth/Throat: No oropharyngeal exudate  Eyes: Pupils are equal, round, and reactive to light  No scleral icterus  Neck: Normal range of motion  Neck supple  No thyromegaly present  Cardiovascular: Normal rate and regular rhythm  No murmur heard  Pulmonary/Chest: Effort normal and breath sounds normal  No respiratory distress  Abdominal:   The abdomen is soft, nondistended and diffusely tender without guarding or rebound  There is no mass palpable or visceromegaly noted  Musculoskeletal: Normal range of motion   He exhibits no edema or tenderness  Lymphadenopathy:     He has no cervical adenopathy  Neurological: He is alert and oriented to person, place, and time  No cranial nerve deficit  Skin: Skin is warm and dry  No erythema  Psychiatric: He has a normal mood and affect   His behavior is normal

## 2018-02-16 PROBLEM — R10.84 GENERALIZED ABDOMINAL PAIN: Status: ACTIVE | Noted: 2018-02-16

## 2018-03-05 NOTE — PRE-PROCEDURE INSTRUCTIONS
Pre-Surgery Instructions:   Medication Instructions    lisinopril (ZESTRIL) 10 mg tablet Patient was instructed by Physician and understands   Naproxen Sodium (ALEVE PO) Patient was instructed by Physician and understands

## 2018-03-10 ENCOUNTER — APPOINTMENT (OUTPATIENT)
Dept: LAB | Facility: HOSPITAL | Age: 58
End: 2018-03-10
Attending: SURGERY
Payer: COMMERCIAL

## 2018-03-10 DIAGNOSIS — R10.84 GENERALIZED ABDOMINAL PAIN: ICD-10-CM

## 2018-03-10 LAB
ANION GAP SERPL CALCULATED.3IONS-SCNC: 7 MMOL/L (ref 4–13)
BASOPHILS # BLD AUTO: 0.07 THOUSANDS/ΜL (ref 0–0.1)
BASOPHILS NFR BLD AUTO: 1 % (ref 0–1)
BUN SERPL-MCNC: 13 MG/DL (ref 5–25)
CALCIUM SERPL-MCNC: 8.7 MG/DL (ref 8.3–10.1)
CHLORIDE SERPL-SCNC: 104 MMOL/L (ref 100–108)
CO2 SERPL-SCNC: 29 MMOL/L (ref 21–32)
CREAT SERPL-MCNC: 0.96 MG/DL (ref 0.6–1.3)
EOSINOPHIL # BLD AUTO: 0.37 THOUSAND/ΜL (ref 0–0.61)
EOSINOPHIL NFR BLD AUTO: 8 % (ref 0–6)
ERYTHROCYTE [DISTWIDTH] IN BLOOD BY AUTOMATED COUNT: 13 % (ref 11.6–15.1)
GFR SERPL CREATININE-BSD FRML MDRD: 87 ML/MIN/1.73SQ M
GLUCOSE P FAST SERPL-MCNC: 110 MG/DL (ref 65–99)
HCT VFR BLD AUTO: 44.2 % (ref 36.5–49.3)
HGB BLD-MCNC: 15 G/DL (ref 12–17)
LYMPHOCYTES # BLD AUTO: 1.16 THOUSANDS/ΜL (ref 0.6–4.47)
LYMPHOCYTES NFR BLD AUTO: 24 % (ref 14–44)
MCH RBC QN AUTO: 29.7 PG (ref 26.8–34.3)
MCHC RBC AUTO-ENTMCNC: 33.9 G/DL (ref 31.4–37.4)
MCV RBC AUTO: 88 FL (ref 82–98)
MONOCYTES # BLD AUTO: 0.42 THOUSAND/ΜL (ref 0.17–1.22)
MONOCYTES NFR BLD AUTO: 9 % (ref 4–12)
NEUTROPHILS # BLD AUTO: 2.89 THOUSANDS/ΜL (ref 1.85–7.62)
NEUTS SEG NFR BLD AUTO: 59 % (ref 43–75)
NRBC BLD AUTO-RTO: 0 /100 WBCS
PLATELET # BLD AUTO: 193 THOUSANDS/UL (ref 149–390)
PMV BLD AUTO: 9.2 FL (ref 8.9–12.7)
POTASSIUM SERPL-SCNC: 4.2 MMOL/L (ref 3.5–5.3)
RBC # BLD AUTO: 5.05 MILLION/UL (ref 3.88–5.62)
SODIUM SERPL-SCNC: 140 MMOL/L (ref 136–145)
WBC # BLD AUTO: 4.94 THOUSAND/UL (ref 4.31–10.16)

## 2018-03-10 PROCEDURE — 85025 COMPLETE CBC W/AUTO DIFF WBC: CPT

## 2018-03-10 PROCEDURE — 80048 BASIC METABOLIC PNL TOTAL CA: CPT

## 2018-03-10 PROCEDURE — 36415 COLL VENOUS BLD VENIPUNCTURE: CPT

## 2018-03-12 ENCOUNTER — TRANSCRIBE ORDERS (OUTPATIENT)
Dept: SURGERY | Facility: CLINIC | Age: 58
End: 2018-03-12

## 2018-03-12 ENCOUNTER — OFFICE VISIT (OUTPATIENT)
Dept: LAB | Facility: HOSPITAL | Age: 58
End: 2018-03-12
Attending: SURGERY
Payer: COMMERCIAL

## 2018-03-12 DIAGNOSIS — Z01.818 PREOP EXAMINATION: ICD-10-CM

## 2018-03-12 DIAGNOSIS — Z01.818 PREOP EXAMINATION: Primary | ICD-10-CM

## 2018-03-12 LAB
ATRIAL RATE: 82 BPM
P AXIS: 38 DEGREES
PR INTERVAL: 154 MS
QRS AXIS: -4 DEGREES
QRSD INTERVAL: 82 MS
QT INTERVAL: 370 MS
QTC INTERVAL: 432 MS
T WAVE AXIS: 43 DEGREES
VENTRICULAR RATE: 82 BPM

## 2018-03-12 PROCEDURE — 93005 ELECTROCARDIOGRAM TRACING: CPT

## 2018-03-12 PROCEDURE — 93010 ELECTROCARDIOGRAM REPORT: CPT | Performed by: INTERNAL MEDICINE

## 2018-03-13 ENCOUNTER — TELEPHONE (OUTPATIENT)
Dept: INTERNAL MEDICINE CLINIC | Facility: CLINIC | Age: 58
End: 2018-03-13

## 2018-03-13 NOTE — TELEPHONE ENCOUNTER
Pt's wife called stating he has and ECG the other day at Dr Francisca Walters office  But he was hoping that you could look it over before it surgery tomorrow just as a second eye      Marito stated he only wanted a call back if there was something abnormal

## 2018-03-14 ENCOUNTER — HOSPITAL ENCOUNTER (OUTPATIENT)
Facility: HOSPITAL | Age: 58
Setting detail: OUTPATIENT SURGERY
Discharge: HOME | End: 2018-03-14
Attending: SURGERY | Admitting: SURGERY
Payer: COMMERCIAL

## 2018-03-14 ENCOUNTER — ANESTHESIA EVENT (OUTPATIENT)
Dept: PERIOP | Facility: HOSPITAL | Age: 58
End: 2018-03-14
Payer: COMMERCIAL

## 2018-03-14 ENCOUNTER — ANESTHESIA (OUTPATIENT)
Dept: PERIOP | Facility: HOSPITAL | Age: 58
End: 2018-03-14
Payer: COMMERCIAL

## 2018-03-14 VITALS
BODY MASS INDEX: 37.38 KG/M2 | HEART RATE: 65 BPM | TEMPERATURE: 97.4 F | SYSTOLIC BLOOD PRESSURE: 135 MMHG | HEIGHT: 72 IN | WEIGHT: 276 LBS | OXYGEN SATURATION: 95 % | RESPIRATION RATE: 34 BRPM | DIASTOLIC BLOOD PRESSURE: 66 MMHG

## 2018-03-14 DIAGNOSIS — R10.84 GENERALIZED ABDOMINAL PAIN: ICD-10-CM

## 2018-03-14 LAB
PLATELET # BLD AUTO: 174 THOUSANDS/UL (ref 149–390)
PMV BLD AUTO: 8.9 FL (ref 8.9–12.7)

## 2018-03-14 PROCEDURE — 85049 AUTOMATED PLATELET COUNT: CPT | Performed by: SURGERY

## 2018-03-14 PROCEDURE — 44180 LAP ENTEROLYSIS: CPT | Performed by: SURGERY

## 2018-03-14 PROCEDURE — 44180 LAP ENTEROLYSIS: CPT | Performed by: PHYSICIAN ASSISTANT

## 2018-03-14 RX ORDER — DIPHENHYDRAMINE HYDROCHLORIDE 50 MG/ML
12.5 INJECTION INTRAMUSCULAR; INTRAVENOUS ONCE AS NEEDED
Status: DISCONTINUED | OUTPATIENT
Start: 2018-03-14 | End: 2018-03-14 | Stop reason: HOSPADM

## 2018-03-14 RX ORDER — ONDANSETRON 2 MG/ML
4 INJECTION INTRAMUSCULAR; INTRAVENOUS EVERY 6 HOURS PRN
Status: DISCONTINUED | OUTPATIENT
Start: 2018-03-14 | End: 2018-03-14 | Stop reason: HOSPADM

## 2018-03-14 RX ORDER — PROPOFOL 10 MG/ML
INJECTION, EMULSION INTRAVENOUS AS NEEDED
Status: DISCONTINUED | OUTPATIENT
Start: 2018-03-14 | End: 2018-03-14 | Stop reason: SURG

## 2018-03-14 RX ORDER — FENTANYL CITRATE 50 UG/ML
INJECTION, SOLUTION INTRAMUSCULAR; INTRAVENOUS AS NEEDED
Status: DISCONTINUED | OUTPATIENT
Start: 2018-03-14 | End: 2018-03-14 | Stop reason: SURG

## 2018-03-14 RX ORDER — DEXMEDETOMIDINE HYDROCHLORIDE 100 UG/ML
INJECTION, SOLUTION INTRAVENOUS AS NEEDED
Status: DISCONTINUED | OUTPATIENT
Start: 2018-03-14 | End: 2018-03-14 | Stop reason: SURG

## 2018-03-14 RX ORDER — SODIUM CHLORIDE, SODIUM LACTATE, POTASSIUM CHLORIDE, CALCIUM CHLORIDE 600; 310; 30; 20 MG/100ML; MG/100ML; MG/100ML; MG/100ML
75 INJECTION, SOLUTION INTRAVENOUS CONTINUOUS
Status: DISCONTINUED | OUTPATIENT
Start: 2018-03-14 | End: 2018-03-14 | Stop reason: HOSPADM

## 2018-03-14 RX ORDER — KETOROLAC TROMETHAMINE 30 MG/ML
INJECTION, SOLUTION INTRAMUSCULAR; INTRAVENOUS AS NEEDED
Status: DISCONTINUED | OUTPATIENT
Start: 2018-03-14 | End: 2018-03-14 | Stop reason: SURG

## 2018-03-14 RX ORDER — MORPHINE SULFATE 2 MG/ML
2 INJECTION, SOLUTION INTRAMUSCULAR; INTRAVENOUS EVERY 2 HOUR PRN
Status: DISCONTINUED | OUTPATIENT
Start: 2018-03-14 | End: 2018-03-14 | Stop reason: HOSPADM

## 2018-03-14 RX ORDER — PROMETHAZINE HYDROCHLORIDE 25 MG/ML
25 INJECTION, SOLUTION INTRAMUSCULAR; INTRAVENOUS ONCE AS NEEDED
Status: DISCONTINUED | OUTPATIENT
Start: 2018-03-14 | End: 2018-03-14 | Stop reason: HOSPADM

## 2018-03-14 RX ORDER — MAGNESIUM HYDROXIDE 1200 MG/15ML
LIQUID ORAL AS NEEDED
Status: DISCONTINUED | OUTPATIENT
Start: 2018-03-14 | End: 2018-03-14 | Stop reason: HOSPADM

## 2018-03-14 RX ORDER — ONDANSETRON 2 MG/ML
INJECTION INTRAMUSCULAR; INTRAVENOUS AS NEEDED
Status: DISCONTINUED | OUTPATIENT
Start: 2018-03-14 | End: 2018-03-14 | Stop reason: SURG

## 2018-03-14 RX ORDER — METOCLOPRAMIDE HYDROCHLORIDE 5 MG/ML
10 INJECTION INTRAMUSCULAR; INTRAVENOUS ONCE AS NEEDED
Status: DISCONTINUED | OUTPATIENT
Start: 2018-03-14 | End: 2018-03-14 | Stop reason: HOSPADM

## 2018-03-14 RX ORDER — GLYCOPYRROLATE 0.2 MG/ML
INJECTION INTRAMUSCULAR; INTRAVENOUS AS NEEDED
Status: DISCONTINUED | OUTPATIENT
Start: 2018-03-14 | End: 2018-03-14 | Stop reason: SURG

## 2018-03-14 RX ORDER — OXYCODONE HYDROCHLORIDE AND ACETAMINOPHEN 5; 325 MG/1; MG/1
1 TABLET ORAL EVERY 4 HOURS PRN
Status: DISCONTINUED | OUTPATIENT
Start: 2018-03-14 | End: 2018-03-14 | Stop reason: HOSPADM

## 2018-03-14 RX ORDER — ROCURONIUM BROMIDE 10 MG/ML
INJECTION, SOLUTION INTRAVENOUS AS NEEDED
Status: DISCONTINUED | OUTPATIENT
Start: 2018-03-14 | End: 2018-03-14 | Stop reason: SURG

## 2018-03-14 RX ORDER — MIDAZOLAM HYDROCHLORIDE 1 MG/ML
INJECTION INTRAMUSCULAR; INTRAVENOUS AS NEEDED
Status: DISCONTINUED | OUTPATIENT
Start: 2018-03-14 | End: 2018-03-14 | Stop reason: SURG

## 2018-03-14 RX ORDER — ACETAMINOPHEN AND CODEINE PHOSPHATE 300; 30 MG/1; MG/1
1 TABLET ORAL EVERY 6 HOURS PRN
Qty: 20 TABLET | Refills: 0 | Status: SHIPPED | OUTPATIENT
Start: 2018-03-14 | End: 2018-03-24

## 2018-03-14 RX ORDER — BUPIVACAINE HYDROCHLORIDE 5 MG/ML
INJECTION, SOLUTION PERINEURAL AS NEEDED
Status: DISCONTINUED | OUTPATIENT
Start: 2018-03-14 | End: 2018-03-14 | Stop reason: HOSPADM

## 2018-03-14 RX ORDER — FENTANYL CITRATE/PF 50 MCG/ML
50 SYRINGE (ML) INJECTION
Status: DISCONTINUED | OUTPATIENT
Start: 2018-03-14 | End: 2018-03-14 | Stop reason: HOSPADM

## 2018-03-14 RX ORDER — MEPERIDINE HYDROCHLORIDE 50 MG/ML
12.5 INJECTION INTRAMUSCULAR; INTRAVENOUS; SUBCUTANEOUS ONCE AS NEEDED
Status: DISCONTINUED | OUTPATIENT
Start: 2018-03-14 | End: 2018-03-14 | Stop reason: HOSPADM

## 2018-03-14 RX ORDER — CLINDAMYCIN PHOSPHATE 900 MG/50ML
900 INJECTION INTRAVENOUS EVERY 8 HOURS
Status: DISCONTINUED | OUTPATIENT
Start: 2018-03-14 | End: 2018-03-14 | Stop reason: HOSPADM

## 2018-03-14 RX ORDER — LIDOCAINE HYDROCHLORIDE 10 MG/ML
INJECTION, SOLUTION INFILTRATION; PERINEURAL AS NEEDED
Status: DISCONTINUED | OUTPATIENT
Start: 2018-03-14 | End: 2018-03-14 | Stop reason: SURG

## 2018-03-14 RX ADMIN — MIDAZOLAM 2 MG: 1 INJECTION INTRAMUSCULAR; INTRAVENOUS at 10:46

## 2018-03-14 RX ADMIN — CLINDAMYCIN PHOSPHATE 900 MG: 18 INJECTION, SOLUTION INTRAMUSCULAR; INTRAVENOUS at 10:53

## 2018-03-14 RX ADMIN — ROCURONIUM BROMIDE 40 MG: 10 INJECTION INTRAVENOUS at 10:50

## 2018-03-14 RX ADMIN — DEXAMETHASONE SODIUM PHOSPHATE 10 MG: 10 INJECTION INTRAMUSCULAR; INTRAVENOUS at 10:53

## 2018-03-14 RX ADMIN — DEXMEDETOMIDINE HYDROCHLORIDE 8 MCG: 100 INJECTION, SOLUTION INTRAVENOUS at 10:58

## 2018-03-14 RX ADMIN — PROPOFOL 200 MG: 10 INJECTION, EMULSION INTRAVENOUS at 10:50

## 2018-03-14 RX ADMIN — FENTANYL CITRATE 50 MCG: 50 INJECTION, SOLUTION INTRAMUSCULAR; INTRAVENOUS at 12:06

## 2018-03-14 RX ADMIN — LIDOCAINE HYDROCHLORIDE 50 MG: 10 INJECTION, SOLUTION INFILTRATION; PERINEURAL at 10:50

## 2018-03-14 RX ADMIN — ONDANSETRON 4 MG: 2 INJECTION INTRAMUSCULAR; INTRAVENOUS at 11:26

## 2018-03-14 RX ADMIN — KETOROLAC TROMETHAMINE 30 MG: 30 INJECTION, SOLUTION INTRAMUSCULAR at 11:36

## 2018-03-14 RX ADMIN — FENTANYL CITRATE 50 MCG: 50 INJECTION, SOLUTION INTRAMUSCULAR; INTRAVENOUS at 12:12

## 2018-03-14 RX ADMIN — NEOSTIGMINE METHYLSULFATE 5 MG: 1 INJECTION, SOLUTION INTRAMUSCULAR; INTRAVENOUS; SUBCUTANEOUS at 11:26

## 2018-03-14 RX ADMIN — DEXMEDETOMIDINE HYDROCHLORIDE 8 MCG: 100 INJECTION, SOLUTION INTRAVENOUS at 11:09

## 2018-03-14 RX ADMIN — ENOXAPARIN SODIUM 40 MG: 40 INJECTION SUBCUTANEOUS at 10:04

## 2018-03-14 RX ADMIN — DEXMEDETOMIDINE HYDROCHLORIDE 8 MCG: 100 INJECTION, SOLUTION INTRAVENOUS at 11:03

## 2018-03-14 RX ADMIN — SODIUM CHLORIDE, SODIUM LACTATE, POTASSIUM CHLORIDE, AND CALCIUM CHLORIDE 75 ML/HR: .6; .31; .03; .02 INJECTION, SOLUTION INTRAVENOUS at 09:40

## 2018-03-14 RX ADMIN — GLYCOPYRROLATE 0.8 MG: 0.2 INJECTION, SOLUTION INTRAMUSCULAR; INTRAVENOUS at 11:26

## 2018-03-14 RX ADMIN — FENTANYL CITRATE 100 MCG: 50 INJECTION, SOLUTION INTRAMUSCULAR; INTRAVENOUS at 10:50

## 2018-03-14 RX ADMIN — OXYCODONE HYDROCHLORIDE AND ACETAMINOPHEN 1 TABLET: 5; 325 TABLET ORAL at 13:00

## 2018-03-14 NOTE — OP NOTE
OPERATIVE REPORT  PATIENT NAME: Rosemary Beavers    :  1960  MRN: [id number]  Pt Location: [facility] 04    SURGERY DATE: 3/14/2018    Surgeon(s) and Role:     Jacek Casillas MD - Primary     * Mariangel Lemus PA-C - Assisting    Preop Diagnosis:  Generalized abdominal pain [R10 84]    Post-Op Diagnosis Codes:     * Generalized abdominal pain [R10 84]    Procedure(s) (LRB):  DIAGNOSTIC LAPAROSCOPY; LYSIS OF ADHESIONS (N/A)    Specimen(s):  * No specimens in log *    Estimated Blood Loss:   Minimal    Drains:       Anesthesia Type:   General    Operative Indications:  Generalized abdominal pain [R884]  14-year-old male complaining of chronic abdominal pain after laparoscopic umbilical hernia repair  The patient was advised to undergo diagnostic laparoscopy possible lysis of adhesions possible open  Operative Findings:  The patient had mild adhesions between the omentum and the and the abdominal wall from the previous hernia repair and the mesh  There were carefully taken down  The Sorbafix tackers were removed on the left side of the mesh  The main pain was on the left side of the abdomen    Complications:   None    Procedure and Technique:  The patient was identified he was placed in the operating table in a supine position  After adequate anesthesia induction and satisfactory endotracheal intubation the abdomen was prepped and draped in a sterile usual fashion with ChloraPrep  Time-out was called the patient was identified as well as surgical site  The abdominal wall was elevated with towel clips, the Veress needle was passed in the epigastric area  The abdomen was insufflated with CO2  After obtaining adequate pneumoperitoneum 5 mm trocar was placed on the right upper quadrant then the scope was advanced in exploration was performed with above findings  No evidence of injury from the Veress needle to internal organs    At this point proceeded to place another 5 mm trocar in the right lower quadrant under direct vision  The adhesions were carefully taken down using the dissected and hook with cautery  Also was complex a proceeded to remove the tackers from the left side of the mesh where most of the pain was concentrated  Once that was accomplished the abdomen was inspected with no evidence of injury to any internal organs  The ports were removed under direct vision with no evidence of bleeding from the abdominal wall  At the end of the case instrument, needles, and sponges counts were correct  The patient tolerated the procedure well     I was present for the entire procedure, A qualified resident physician was not available and A physician assistant was required during the procedure for retraction tissue handling,dissection and suturing    Patient Disposition:  PACU     SIGNATURE: Adrian Rice MD  DATE: March 14, 2018  TIME: 11:28 AM

## 2018-03-14 NOTE — ANESTHESIA PREPROCEDURE EVALUATION
Review of Systems/Medical History  Patient summary reviewed  Chart reviewed      Cardiovascular  EKG reviewed, Exercise tolerance: good,  Hypertension poorly controlled,    Pulmonary  Smoker cigarette smoker  Cumulative Pack Years: 20,        GI/Hepatic  Negative GI/hepatic ROS          Kidney stones,        Endo/Other    Obesity  morbid obesity   GYN       Hematology  Negative hematology ROS      Musculoskeletal  Negative musculoskeletal ROS        Neurology  Negative neurology ROS      Psychology         Lab Results   Component Value Date    WBC 4 94 03/10/2018    HGB 15 0 03/10/2018    HCT 44 2 03/10/2018    MCV 88 03/10/2018     03/14/2018     Lab Results   Component Value Date    GLUCOSE 118 10/31/2017    CALCIUM 8 7 03/10/2018     03/10/2018    K 4 2 03/10/2018    CO2 29 03/10/2018     03/10/2018    BUN 13 03/10/2018    CREATININE 0 96 03/10/2018     BMI Readings from Last 1 Encounters:   03/14/18 37 43 kg/m²         Physical Exam    Airway    Mallampati score: III  TM Distance: >3 FB  Neck ROM: full     Dental   Comment: #22, #27 loose; patient aware of increased risk of damage, upper dentures and lower dentures,     Cardiovascular  Rhythm: regular, Rate: normal, Cardiovascular exam normal    Pulmonary  Pulmonary exam normal Breath sounds clear to auscultation,     Other Findings        Anesthesia Plan  ASA Score- 2     Anesthesia Type- general with ASA Monitors  Additional Monitors:   Airway Plan: ETT  Plan Factors-    Induction- intravenous  Postoperative Plan- Plan for postoperative opioid use  Planned trial extubation    Informed Consent- Anesthetic plan and risks discussed with patient  I personally reviewed this patient with the CRNA  Discussed and agreed on the Anesthesia Plan with the CRNA  Elvin Trent

## 2018-03-14 NOTE — DISCHARGE INSTRUCTIONS
Abdominal Pain, Ambulatory Care   Uday Conley RE & Teto S: Abdominal Pain  In: Mickey Garcia, Melissa RM, [employer], et al, eds  [address] [facility] Consult, 4th ed  [address] [facility],[address] [place], [place], 2011  Sunny Martinez, Ulysses CUEVAS, & Junito F: Approach to Abdominal Pain  In: Hui Lambert  The [address] [facility], 2nd ed  [address], [place], [place], 2006 © 2014 [address] is for End User's use only and may not be sold, redistributed or otherwise used for commercial purposes  All illustrations and images included in CareNotes® are the copyrighted property of A D A M , Inc  or Gaurav Mehta  The above information is an  only  It is not intended as medical advice for individual conditions or treatments  Talk to your doctor, nurse or pharmacist before following any medical regimen to see if it is safe and effective for you  No diet restriction for this surgery  May shower every day  Remove dressings in 3 days  Remove strips in 7 days  Call office to make an appointment in 2 weeks [phone]  Call office with any issues regarding the surgery  No driving, heavy lifting or strenuous exercise for one week  Resume home medications  Apply ice to the incisions  May take Tylenol 3, regular Tylenol or ibuprofen for pain    Abdominal Pain   WHAT YOU NEED TO KNOW:   Abdominal pain can be dull, achy, or sharp  You may have pain in one area of your abdomen, or in your entire abdomen  Your pain may be caused by a condition such as constipation, food sensitivity or poisoning, infection, or a blockage  Abdominal pain can also be from a hernia, appendicitis, or an ulcer  Liver, gallbladder, or kidney conditions can also cause abdominal pain  The cause of your abdominal pain may be unknown  DISCHARGE INSTRUCTIONS:   Return to the emergency department if:   · You have new chest pain or shortness of breath      · You have pulsing pain in your upper abdomen or lower back that suddenly becomes constant  · Your pain is in the right lower abdominal area and worsens with movement  · You have a fever over 100 4°F (38°C) or shaking chills  · You are vomiting and cannot keep food or liquids down  · Your pain does not improve or gets worse over the next 8 to 12 hours  · You see blood in your vomit or bowel movements, or they look black and tarry  · Your skin or the whites of your eyes turn yellow  · You are a woman and have a large amount of vaginal bleeding that is not your monthly period  Contact your healthcare provider if:   · You have pain in your lower back  · You are a man and have pain in your testicles  · You have pain when you urinate  · You have questions or concerns about your condition or care  Follow up with your healthcare provider within 24 hours or as directed:  Write down your questions so you remember to ask them during your visits  Medicines:   · Medicines  may be given to calm your stomach and prevent vomiting or to decrease pain  Ask how to take pain medicine safely  · Take your medicine as directed  Contact your healthcare provider if you think your medicine is not helping or if you have side effects  Tell him of her if you are allergic to any medicine  Keep a list of the medicines, vitamins, and herbs you take  Include the amounts, and when and why you take them  Bring the list or the pill bottles to follow-up visits  Carry your medicine list with you in case of an emergency  © 2017 [address] Information is for End User's use only and may not be sold, redistributed or otherwise used for commercial purposes  All illustrations and images included in CareNotes® are the copyrighted property of A [employer]  or Gaurav Mehta  The above information is an  only   It is not intended as medical advice for individual conditions or treatments  Talk to your doctor, nurse or pharmacist before following any medical regimen to see if it is safe and effective for you

## 2018-03-14 NOTE — H&P (VIEW-ONLY)
Follow-up- General Surgery   Flavia Lawler 62 y o  male MRN: [id number]  Unit/Bed#:  Encounter: [id number]    Assessment/Plan     Assessment:  Persistent abdominal pain, not improving despite medical treatment  Status post laparoscopic umbilical hernia repair with mesh  Plan:  I advised the patient to undergo diagnostic laparoscopy, possible lysis of adhesions, possible steroid injection of the abdominal wall nerves  History of Present Illness     HPI:  I had the pleasure of seeing Flavia Lawler in the office today accompanied by his wife for follow-up  The patient is a pleasant 62 y o  male who had laparoscopic umbilical hernia repair couple months ago, the patient has been complaining of persistent abdominal pain around the umbilicus without improvement of the symptoms despite medical management and lidocaine injection  He denies having any nausea, vomiting, diarrhea, constipation or any other constitutional symptoms  Review of Systems   Constitutional: Negative for chills and fever  HENT: Negative for nosebleeds and sore throat  Eyes: Negative for pain and discharge  Respiratory: Negative for cough and shortness of breath  Cardiovascular: Negative for chest pain and palpitations  Gastrointestinal:        As per history of present illness  Endocrine: Negative for cold intolerance and heat intolerance  Genitourinary: Negative for dysuria and hematuria  Musculoskeletal: Negative for arthralgias and joint swelling  Neurological: Negative for seizures and headaches  Hematological: Negative for adenopathy  Does not bruise/bleed easily  Psychiatric/Behavioral: Negative for confusion  The patient is not nervous/anxious          Historical Information   Past Medical History:   Diagnosis Date    Hypertension     Kidney stone      Past Surgical History:   Procedure Laterality Date    CYSTOSCOPY W/ LASER LITHOTRIPSY      HERNIA REPAIR      HIP DEBRIDEMENT Right     DC LAP, VENTRAL HERNIA REPAIR,REDUCIBLE N/A 12/6/2017    Procedure: LAPAROSCOPIC UMBILICAL HERNIA REPAIR WITH MESH;  Surgeon: Leonardo Leon MD;  [facility];  Service: General     Social History   History   Alcohol Use    Yes     Comment: social     History   Drug Use    Types: Marijuana     History   Smoking Status    Current Every Day Smoker    Packs/day: 0 20    Types: Cigarettes   Smokeless Tobacco    Never Used     Family History:   Family History   Problem Relation Age of Onset    Hypertension Father     Cancer Father        Meds/Allergies   PTA meds:   Cannot display prior to admission medications because the patient has not been admitted in this contact  Allergies   Allergen Reactions    Ceclor [Cefaclor] Rash       Objective   First Vitals:   @VSFIRST2(5,8,6,7,9,11,14,10:FIRST)@    Current Vitals:   Blood Pressure: 148/86 (02/15/18 1503)  Pulse: 76 (02/15/18 1503)  Temperature: 98 3 °F (36 8 °C) (02/15/18 1503)  Temp Source: Oral (02/15/18 1503)  Respirations: 14 (02/15/18 1503)  Height: 6' (182 9 cm) (02/15/18 1503)  Weight - Scale: 126 kg (277 lb 1 3 oz) (02/15/18 1503)    [unfilled]    Invasive Devices     Peripheral Intravenous Line            Peripheral IV 01/18/18 Right Antecubital 27 days                Physical Exam   Constitutional: He is oriented to person, place, and time  He appears well-developed and well-nourished  No distress  HENT:   Head: Normocephalic  Mouth/Throat: No oropharyngeal exudate  Eyes: Pupils are equal, round, and reactive to light  No scleral icterus  Neck: Normal range of motion  Neck supple  No thyromegaly present  Cardiovascular: Normal rate and regular rhythm  No murmur heard  Pulmonary/Chest: Effort normal and breath sounds normal  No respiratory distress  Abdominal:   The abdomen is soft, nondistended and diffusely tender without guarding or rebound  There is no mass palpable or visceromegaly noted  Musculoskeletal: Normal range of motion   He exhibits no edema or tenderness  Lymphadenopathy:     He has no cervical adenopathy  Neurological: He is alert and oriented to person, place, and time  No cranial nerve deficit  Skin: Skin is warm and dry  No erythema  Psychiatric: He has a normal mood and affect   His behavior is normal

## 2018-03-14 NOTE — ANESTHESIA POSTPROCEDURE EVALUATION
Post-Op Assessment Note      CV Status:  Stable    Mental Status:  Alert and awake    Hydration Status:  Euvolemic    PONV Controlled:  Controlled    Airway Patency:  Patent    Post Op Vitals Reviewed: Yes          Staff: CRNA           /74 (03/14/18 1141)    Temp (!) 97 2 °F (36 2 °C) (03/14/18 1141)    Pulse 67 (03/14/18 1141)   Resp 12 (03/14/18 1141)    SpO2   95

## 2018-03-22 ENCOUNTER — TELEPHONE (OUTPATIENT)
Dept: SURGERY | Facility: CLINIC | Age: 58
End: 2018-03-22

## 2018-03-22 NOTE — TELEPHONE ENCOUNTER
Nicanor Blunt called and would like a refill on Scot's pain medication  Did not do it through refill option because it is a Narc and not sure if Dr would like to fill that  Call wife or Scot back

## 2018-03-23 ENCOUNTER — TELEPHONE (OUTPATIENT)
Dept: SURGERY | Facility: CLINIC | Age: 58
End: 2018-03-23

## 2018-03-23 NOTE — TELEPHONE ENCOUNTER
patient wife called requesting and RX for percocet  I reached out to Dr David Ko to see if he would give another script being that he gave one on the 15th for percocet 5-325, 20 tab -[id number] for pain  patient wife informed that we will call her back

## 2018-03-29 ENCOUNTER — TELEPHONE (OUTPATIENT)
Dept: INTERNAL MEDICINE CLINIC | Facility: CLINIC | Age: 58
End: 2018-03-29

## 2018-03-29 ENCOUNTER — OFFICE VISIT (OUTPATIENT)
Dept: SURGERY | Facility: CLINIC | Age: 58
End: 2018-03-29

## 2018-03-29 VITALS
TEMPERATURE: 98.7 F | DIASTOLIC BLOOD PRESSURE: 72 MMHG | RESPIRATION RATE: 16 BRPM | WEIGHT: 278 LBS | BODY MASS INDEX: 37.65 KG/M2 | SYSTOLIC BLOOD PRESSURE: 132 MMHG | HEART RATE: 80 BPM | HEIGHT: 72 IN

## 2018-03-29 DIAGNOSIS — Z48.89 POSTOPERATIVE VISIT: Primary | ICD-10-CM

## 2018-03-29 PROCEDURE — 99024 POSTOP FOLLOW-UP VISIT: CPT | Performed by: SURGERY

## 2018-03-29 NOTE — PROGRESS NOTES
Postop follow-up- General Surgery   Re Marrero 62 y o  male MRN: [id number]  Unit/Bed#:  Encounter: [id number]    Assessment/Plan     Assessment:  The patient is status post diagnostic laparoscopy and lysis of adhesions for persisting abdominal pain, findings during laparoscopy does not explain symptomatology  I suspected the patient is dealing with some sort of rheumatologic condition  Plan:  I will defer the workup and follow up to his family doctor  History of Present Illness     HPI:  I had the pleasure of seeing Re Marrero in the office today accompanied by his wife for 1st postop follow-up after diagnostic laparoscopy and lysis of adhesions and removal of the Sorbafix from the mesh  The patient stated having severe pain, unbearable and he cannot sleep at night  He denies having any fever, chills, nausea, vomiting, diarrhea, constipation or any other constitutional symptoms  Review of Systems: The rest of the review of system total of 10 were negative except for the HPI      Historical Information   Past Medical History:   Diagnosis Date    Hypertension     Kidney stone      Past Surgical History:   Procedure Laterality Date    CYSTOSCOPY W/ LASER LITHOTRIPSY      HERNIA REPAIR      x2    HIP DEBRIDEMENT Right     LAPAROSCOPY N/A 3/14/2018    Procedure: DIAGNOSTIC LAPAROSCOPY; LYSIS OF ADHESIONS;  Surgeon: Nirmala Pope MD;  [facility];  Service: General    [place] LAP, VENTRAL HERNIA REPAIR,REDUCIBLE N/A 12/6/2017    Procedure: LAPAROSCOPIC UMBILICAL HERNIA REPAIR WITH MESH;  Surgeon: Nirmala Pope MD;  [facility];  Service: General     Social History   History   Alcohol Use    Yes     Comment: social     History   Drug Use No     History   Smoking Status    Current Every Day Smoker    Packs/day: 0 20    Types: Cigarettes   Smokeless Tobacco    Never Used     Family History:   Family History   Problem Relation Age of Onset    Hypertension Father     Cancer Father        Meds/Allergies   PTA meds:   Cannot display prior to admission medications because the patient has not been admitted in this contact  Allergies   Allergen Reactions    Ceclor [Cefaclor] Hives       Objective     Physical Exam:  The abdomen is soft, nondistended and diffusely mildly tender without guarding or rebound  Incisions located on the right upper quadrant and right lower quadrant are completely healed without evidence of infection or incisional hernia

## 2018-04-02 ENCOUNTER — OFFICE VISIT (OUTPATIENT)
Dept: INTERNAL MEDICINE CLINIC | Facility: CLINIC | Age: 58
End: 2018-04-02
Payer: COMMERCIAL

## 2018-04-02 VITALS
HEART RATE: 98 BPM | SYSTOLIC BLOOD PRESSURE: 146 MMHG | DIASTOLIC BLOOD PRESSURE: 80 MMHG | WEIGHT: 282 LBS | HEIGHT: 72 IN | OXYGEN SATURATION: 98 % | RESPIRATION RATE: 18 BRPM | BODY MASS INDEX: 38.19 KG/M2

## 2018-04-02 DIAGNOSIS — G89.18 POST-OP PAIN: Primary | ICD-10-CM

## 2018-04-02 DIAGNOSIS — R10.9 ABDOMINAL WALL PAIN: ICD-10-CM

## 2018-04-02 PROCEDURE — 99213 OFFICE O/P EST LOW 20 MIN: CPT | Performed by: PHYSICIAN ASSISTANT

## 2018-04-02 RX ORDER — GABAPENTIN 100 MG/1
100 CAPSULE ORAL 3 TIMES DAILY
Qty: 90 CAPSULE | Refills: 1 | Status: SHIPPED | OUTPATIENT
Start: 2018-04-02 | End: 2018-04-19 | Stop reason: SDUPTHER

## 2018-04-02 NOTE — PROGRESS NOTES
Assessment/Plan:  Patient Instructions     Will a ask 2nd opinion of another surgical group to comment on the postop pain and whether not this is unusual or may be needing more time to improve  Will give a trial of gabapentin to see if we can improve the pain irritability  Patient will start with gabapentin 100 mg at bedtime for 3 nights if not having any side effects may  Increased to twice a day, then 3 times a day if needed  Schedule follow-up in 2 weeks to assess any improvement  Followup scheduled per orders  Diagnoses and all orders for this visit:    Post-op pain  Comments:  abdomen  Orders:  -     gabapentin (NEURONTIN) 100 mg capsule; Take 1 capsule (100 mg total) by mouth 3 (three) times a day  -     Ambulatory referral to General Surgery; Future    Abdominal wall pain  -     gabapentin (NEURONTIN) 100 mg capsule; Take 1 capsule (100 mg total) by mouth 3 (three) times a day  -     Ambulatory referral to General Surgery; Future          Subjective:      Patient ID: Iman Ferreira is a 62 y o  male  Follow-up    Patient is status post laparoscopic umbilical hernia repair on 12/6/17  After surgery patient had developed abdominal pain that was not improving therefore he was taken back to surgery on 03/14/2018 for laparoscopy and lysis of adhesions  Despite this the patient continues with pain  He points to around his umbilicus up his mid abdomen to his ribcage area  He states the pain is aggravated by twisting, turning, sitting for prolonged periods or any prolonged activity  He states his bowel and bladder function is normal   He is not experiencing any nausea, vomiting, diarrhea  He has no fever or chills  He feels an area near his umbilicus that feels lumpy and is the site of a lot of his pain  He has been using Aleve at this time for pain which helps minimally  He and his wife was present are concerned about the etiology of the pain   He thinks maybe it is a times then and will gradually improve  He is not asking for narcotics nor does he want any  We discussed where to go from here  We will try a titrating course of gabapentin  We will also ask another Surgical group for an opinion  He he does do abdominal muscle intensive work, and has not been able to get back to work due to the ongoing pain          ALLERGIES:  Allergies   Allergen Reactions    Ceclor [Cefaclor] Hives       CURRENT MEDICATIONS:    Current Outpatient Prescriptions:     lisinopril (ZESTRIL) 10 mg tablet, Take 12 5 mg by mouth daily  , Disp: , Rfl:     Naproxen Sodium (ALEVE PO), Take 2 capsules by mouth  , Disp: , Rfl:     gabapentin (NEURONTIN) 100 mg capsule, Take 1 capsule (100 mg total) by mouth 3 (three) times a day, Disp: 90 capsule, Rfl: 1    ondansetron (ZOFRAN) 4 mg tablet, Take 1 tablet by mouth every 8 (eight) hours as needed for nausea or vomiting for up to 7 days, Disp: 12 tablet, Rfl: 0    ACTIVE PROBLEM LIST:  Patient Active Problem List   Diagnosis    Recurrent umbilical hernia    Generalized abdominal pain    Post-op pain    Hyperlipidemia    Hypertension    Obesity       PAST MEDICAL HISTORY:  Past Medical History:   Diagnosis Date    Hypertension     Kidney stone        PAST SURGICAL HISTORY:  Past Surgical History:   Procedure Laterality Date    CYSTOSCOPY W/ LASER LITHOTRIPSY      HERNIA REPAIR      x2    HIP DEBRIDEMENT Right     LAPAROSCOPY N/A 3/14/2018    Procedure: DIAGNOSTIC LAPAROSCOPY; LYSIS OF ADHESIONS;  Surgeon: Wyline Schilder, MD;  [facility];  Service: General    IN LAP, VENTRAL HERNIA REPAIR,REDUCIBLE N/A 12/6/2017    Procedure: LAPAROSCOPIC UMBILICAL HERNIA REPAIR WITH MESH;  Surgeon: Wyline Schilder, MD;  [facility];  Service: General       FAMILY HISTORY:  Family History   Problem Relation Age of Onset    Hypertension Father     Cancer Father        SOCIAL HISTORY:  Social History     Social History    Marital status: /Civil Union Spouse name: N/A    Number of children: N/A    Years of education: N/A     Occupational History    Not on file  Social History Main Topics    Smoking status: Current Every Day Smoker     Packs/day: 0 20     Types: Cigarettes    Smokeless tobacco: Never Used    Alcohol use Yes      Comment: social    Drug use: No    Sexual activity: Not on file     Other Topics Concern    Not on file     Social History Narrative    No narrative on file       Review of Systems   Constitutional: Negative for activity change, chills, fatigue and fever  HENT: Negative for congestion  Eyes: Negative for discharge  Respiratory: Negative for cough, chest tightness and shortness of breath  Cardiovascular: Negative for chest pain, palpitations and leg swelling  Gastrointestinal: Positive for abdominal pain  Negative for abdominal distention, blood in stool, constipation, diarrhea, nausea, rectal pain and vomiting  Genitourinary: Negative for difficulty urinating  Musculoskeletal: Negative for arthralgias and myalgias  Skin: Negative for rash  Allergic/Immunologic: Negative for immunocompromised state  Neurological: Negative for dizziness, syncope, weakness, light-headedness and headaches  Hematological: Negative for adenopathy  Does not bruise/bleed easily  Psychiatric/Behavioral: Negative for dysphoric mood  The patient is not nervous/anxious  Objective:  Vitals:    04/02/18 1525   BP: 146/80   BP Location: Right arm   Patient Position: Sitting   Cuff Size: Large   Pulse: 98   Resp: 18   SpO2: 98%   Weight: 128 kg (282 lb)   Height: 6' (1 829 m)        Physical Exam   Constitutional: He is oriented to person, place, and time  He appears well-developed and well-nourished  HENT:   Right Ear: External ear normal    Left Ear: External ear normal    Mouth/Throat: Oropharynx is clear and moist  No oropharyngeal exudate     Eyes: Conjunctivae are normal  Pupils are equal, round, and reactive to light    Neck: Carotid bruit is not present  Cardiovascular: Normal rate, regular rhythm, normal heart sounds and intact distal pulses  Pulmonary/Chest: Effort normal and breath sounds normal  He has no wheezes  He has no rales  Abdominal: Soft  Bowel sounds are normal  He exhibits no distension and no mass  There is tenderness  There is guarding  There is no rebound  Abdominal wall is tender along both rectus abdominis muscles more on the left than the right  There is a sensation on the left near the umbilicus of a mass type area which the patient complains is significantly tender  There is no other tenderness on the abdomen  Musculoskeletal: He exhibits no edema  Lymphadenopathy:     He has no cervical adenopathy  Neurological: He is alert and oriented to person, place, and time  Skin: Skin is warm and dry  No rash noted  Psychiatric: He has a normal mood and affect  Nursing note and vitals reviewed          RESULTS:    Recent Results (from the past 1008 hour(s))   CBC and differential    Collection Time: 03/10/18 10:10 AM   Result Value Ref Range    WBC 4 94 4 31 - 10 16 Thousand/uL    RBC 5 05 3 88 - 5 62 Million/uL    Hemoglobin 15 0 12 0 - 17 0 g/dL    Hematocrit 44 2 36 5 - 49 3 %    MCV 88 82 - 98 fL    MCH 29 7 26 8 - 34 3 pg    MCHC 33 9 31 4 - 37 4 g/dL    RDW 13 0 11 6 - 15 1 %    MPV 9 2 8 9 - 12 7 fL    Platelets 294 465 - 308 Thousands/uL    nRBC 0 /100 WBCs    Neutrophils Relative 59 43 - 75 %    Lymphocytes Relative 24 14 - 44 %    Monocytes Relative 9 4 - 12 %    Eosinophils Relative 8 (H) 0 - 6 %    Basophils Relative 1 0 - 1 %    Neutrophils Absolute 2 89 1 85 - 7 62 Thousands/µL    Lymphocytes Absolute 1 16 0 60 - 4 47 Thousands/µL    Monocytes Absolute 0 42 0 17 - 1 22 Thousand/µL    Eosinophils Absolute 0 37 0 00 - 0 61 Thousand/µL    Basophils Absolute 0 07 0 00 - 0 10 Thousands/µL   Basic metabolic panel    Collection Time: 03/10/18 10:10 AM   Result Value Ref Range Sodium 140 136 - 145 mmol/L    Potassium 4 2 3 5 - 5 3 mmol/L    Chloride 104 100 - 108 mmol/L    CO2 29 21 - 32 mmol/L    Anion Gap 7 4 - 13 mmol/L    BUN 13 5 - 25 mg/dL    Creatinine 0 96 0 60 - 1 30 mg/dL    Glucose, Fasting 110 (H) 65 - 99 mg/dL    Calcium 8 7 8 3 - 10 1 mg/dL    eGFR 87 ml/min/1 73sq m   ECG 12 lead    Collection Time: 03/12/18 12:17 PM   Result Value Ref Range    Ventricular Rate 82 BPM    Atrial Rate 82 BPM    UT Interval 154 ms    QRSD Interval 82 ms    QT Interval 370 ms    QTC Interval 432 ms    P Union 38 degrees    QRS Axis -4 degrees    T Wave Axis 43 degrees   Platelet count    Collection Time: 03/14/18  9:33 AM   Result Value Ref Range    Platelets 777 922 - 805 Thousands/uL    MPV 8 9 8 9 - 12 7 fL

## 2018-04-02 NOTE — PATIENT INSTRUCTIONS
Will a ask 2nd opinion of another surgical group to comment on the postop pain and whether not this is unusual or may be needing more time to improve  Will give a trial of gabapentin to see if we can improve the pain irritability  Patient will start with gabapentin 100 mg at bedtime for 3 nights if not having any side effects may  Increased to twice a day, then 3 times a day if needed  Schedule follow-up in 2 weeks to assess any improvement

## 2018-04-15 DIAGNOSIS — I10 ESSENTIAL HYPERTENSION: Primary | ICD-10-CM

## 2018-04-16 RX ORDER — LISINOPRIL AND HYDROCHLOROTHIAZIDE 20; 12.5 MG/1; MG/1
TABLET ORAL
Qty: 30 TABLET | Refills: 2 | OUTPATIENT
Start: 2018-04-16

## 2018-04-16 NOTE — TELEPHONE ENCOUNTER
Spoke to Patient regarding the Lisinopril-HCTZ 20-12 5 MG Tab and advised patient that Lauren Livingston is not prescribing  To continue regular lisinopril- HCTZ 20 -12 5 Mg  Patient does not need a refill at this time

## 2018-04-17 NOTE — TELEPHONE ENCOUNTER
The before message is very unclear  Please contact the wife, as I understand Harvey Pak is taking plain lisinopril 10 mg daily, please clarify, make sure this is correct then let's make sure we discontinue the lisinopril/hydrochlorothiazide

## 2018-04-18 NOTE — TELEPHONE ENCOUNTER
Spoke to patients wife  Patient is only taking regular Lisinopril  Patient has several refills left at the pharmacy  Patient does not need anything to be refilled at this time

## 2018-04-19 ENCOUNTER — OFFICE VISIT (OUTPATIENT)
Dept: INTERNAL MEDICINE CLINIC | Facility: CLINIC | Age: 58
End: 2018-04-19
Payer: COMMERCIAL

## 2018-04-19 VITALS
HEIGHT: 72 IN | WEIGHT: 274.6 LBS | OXYGEN SATURATION: 94 % | HEART RATE: 84 BPM | SYSTOLIC BLOOD PRESSURE: 142 MMHG | DIASTOLIC BLOOD PRESSURE: 86 MMHG | RESPIRATION RATE: 18 BRPM | BODY MASS INDEX: 37.19 KG/M2

## 2018-04-19 DIAGNOSIS — G89.18 POST-OP PAIN: ICD-10-CM

## 2018-04-19 DIAGNOSIS — R10.9 ABDOMINAL WALL PAIN: ICD-10-CM

## 2018-04-19 DIAGNOSIS — R10.84 GENERALIZED ABDOMINAL PAIN: ICD-10-CM

## 2018-04-19 DIAGNOSIS — G89.18 POST-OP PAIN: Primary | ICD-10-CM

## 2018-04-19 PROCEDURE — 99214 OFFICE O/P EST MOD 30 MIN: CPT | Performed by: PHYSICIAN ASSISTANT

## 2018-04-19 RX ORDER — METHOCARBAMOL 750 MG/1
TABLET, FILM COATED ORAL
Refills: 0 | COMMUNITY
Start: 2018-04-11 | End: 2018-05-16 | Stop reason: SINTOL

## 2018-04-19 RX ORDER — GABAPENTIN 100 MG/1
CAPSULE ORAL
Qty: 180 CAPSULE | Refills: 1 | Status: SHIPPED | OUTPATIENT
Start: 2018-04-19 | End: 2018-06-06 | Stop reason: SDUPTHER

## 2018-04-19 NOTE — PATIENT INSTRUCTIONS
Will increase gabapentin to 2  Capsules 3 times a day  Continue other medications the same  Schedule follow-up  05/15/2018

## 2018-04-19 NOTE — PROGRESS NOTES
Assessment/Plan:     Patient Instructions    Will increase gabapentin to 2  Capsules 3 times a day  Continue other medications the same  Schedule follow-up  05/15/2018  Total time spent was greater than 25 minutes face to face of which greater than 50 percent was  for counseling and coordination of care, this included review of 2nd opinion and filling out required disability forms      Followup scheduled per orders  Diagnoses and all orders for this visit:    Post-op pain  -     gabapentin (NEURONTIN) 100 mg capsule; Take 2 capsules 3 times a day    Generalized abdominal pain    Post-op pain  Comments:  abdomen  Orders:  -     gabapentin (NEURONTIN) 100 mg capsule; Take 2 capsules 3 times a day    Abdominal wall pain  -     gabapentin (NEURONTIN) 100 mg capsule; Take 2 capsules 3 times a day    Other orders  -     methocarbamol (ROBAXIN) 750 mg tablet; TAKE 1 TABLET (750 MG TOTAL) BY MOUTH 4 (FOUR) TIMES A DAY AS NEEDED FOR MUSCLE SPASMS  Subjective:      Patient ID: Alma Jules is a 62 y o  male  Follow-up    Persistent postop pain with abdominal wall pain  Because of this I sent the patient for 2nd surgical opinion  Review of this consultation indicates that the patient will need extra time to recuperate from his to recent procedures 1 repairing an umbilical hernia, and the 2nd a laparoscopic lysis of adhesions  These to surgical reports can be reviewed in the EMR  Medications were adjusted to include a muscle relaxant, changing his anti-inflammatories to ibuprofen, and continuing his gabapentin  Patient present with his wife both feel he is starting to make gains  He is able to ride in a car and when he gets out he does not have the severity of pain in his abdomen that he had in the past   He is able to move about with twisting and turning movements with less pain but again the pain is not completely resolved    Second surgical consultation did advise that he not return to the type of work he usually does for 1 month  This patient describes his work activity as lifting, pushing and pulling a varied amount of weight during his entire day  ALLERGIES:  Allergies   Allergen Reactions    Ceclor [Cefaclor] Hives       CURRENT MEDICATIONS:    Current Outpatient Prescriptions:     lisinopril (ZESTRIL) 10 mg tablet, Take 12 5 mg by mouth daily  , Disp: , Rfl:     methocarbamol (ROBAXIN) 750 mg tablet, TAKE 1 TABLET (750 MG TOTAL) BY MOUTH 4 (FOUR) TIMES A DAY AS NEEDED FOR MUSCLE SPASMS , Disp: , Rfl: 0    gabapentin (NEURONTIN) 100 mg capsule, Take 2 capsules 3 times a day, Disp: 180 capsule, Rfl: 1    ondansetron (ZOFRAN) 4 mg tablet, Take 1 tablet by mouth every 8 (eight) hours as needed for nausea or vomiting for up to 7 days, Disp: 12 tablet, Rfl: 0    ACTIVE PROBLEM LIST:  Patient Active Problem List   Diagnosis    Recurrent umbilical hernia    Generalized abdominal pain    Post-op pain    Hyperlipidemia    Hypertension    Obesity       PAST MEDICAL HISTORY:  Past Medical History:   Diagnosis Date    Hypertension     Kidney stone        PAST SURGICAL HISTORY:  Past Surgical History:   Procedure Laterality Date    CYSTOSCOPY W/ LASER LITHOTRIPSY      HERNIA REPAIR      x2    HIP DEBRIDEMENT Right     LAPAROSCOPY N/A 3/14/2018    Procedure: DIAGNOSTIC LAPAROSCOPY; LYSIS OF ADHESIONS;  Surgeon: Francia Duncan MD;  [facility];  Service: General    [place] LAP, VENTRAL HERNIA REPAIR,REDUCIBLE N/A 12/6/2017    Procedure: LAPAROSCOPIC UMBILICAL HERNIA REPAIR WITH MESH;  Surgeon: Francia Duncan MD;  [facility];  Service: General       FAMILY HISTORY:  Family History   Problem Relation Age of Onset    Hypertension Father     Cancer Father        SOCIAL HISTORY:  Social History     Social History    Marital status: /Civil Union     Spouse name: N/A    Number of children: N/A    Years of education: N/A     Occupational History    Not on file       Social History Main Topics    Smoking status: Current Every Day Smoker     Packs/day: 0 20     Types: Cigarettes    Smokeless tobacco: Never Used    Alcohol use Yes      Comment: social    Drug use: No    Sexual activity: Not on file     Other Topics Concern    Not on file     Social History Narrative    No narrative on file       Review of Systems   Constitutional: Negative for activity change, chills, fatigue and fever  HENT: Negative for congestion  Eyes: Negative for discharge  Respiratory: Negative for cough, chest tightness and shortness of breath  Cardiovascular: Negative for chest pain, palpitations and leg swelling  Gastrointestinal: Positive for abdominal pain  Negative for abdominal distention, blood in stool, constipation and rectal pain  Genitourinary: Negative for difficulty urinating  Musculoskeletal: Negative for arthralgias and myalgias  Skin: Negative for rash  Allergic/Immunologic: Negative for immunocompromised state  Neurological: Negative for dizziness, syncope, weakness, light-headedness and headaches  Hematological: Negative for adenopathy  Does not bruise/bleed easily  Psychiatric/Behavioral: Negative for dysphoric mood  The patient is not nervous/anxious  Objective:  Vitals:    04/19/18 1537   BP: 142/86   BP Location: Left arm   Patient Position: Sitting   Cuff Size: Large   Pulse: 84   Resp: 18   SpO2: 94%   Weight: 125 kg (274 lb 9 6 oz)   Height: 6' (1 829 m)        Physical Exam   Constitutional: He is oriented to person, place, and time  He appears well-developed and well-nourished  No distress  Cardiovascular: Normal rate, regular rhythm and normal heart sounds  Pulmonary/Chest: Effort normal and breath sounds normal    Abdominal: Soft   Bowel sounds are normal  There is tenderness (Patient continues to have tenderness along the right rectus abdominis muscle, with a sensation of fullness and or mass near the umbilicus which remains tender but clinically feels smaller and less tender)  Musculoskeletal: He exhibits no edema  Patient is moving about the exam room better than he had at last visit  Neurological: He is alert and oriented to person, place, and time  Skin: Skin is warm and dry  No rash noted  Psychiatric: He has a normal mood and affect  His behavior is normal    Nursing note and vitals reviewed          RESULTS:    Recent Results (from the past 1008 hour(s))   CBC and differential    Collection Time: 03/10/18 10:10 AM   Result Value Ref Range    WBC 4 94 4 31 - 10 16 Thousand/uL    RBC 5 05 3 88 - 5 62 Million/uL    Hemoglobin 15 0 12 0 - 17 0 g/dL    Hematocrit 44 2 36 5 - 49 3 %    MCV 88 82 - 98 fL    MCH 29 7 26 8 - 34 3 pg    MCHC 33 9 31 4 - 37 4 g/dL    RDW 13 0 11 6 - 15 1 %    MPV 9 2 8 9 - 12 7 fL    Platelets 433 612 - 521 Thousands/uL    nRBC 0 /100 WBCs    Neutrophils Relative 59 43 - 75 %    Lymphocytes Relative 24 14 - 44 %    Monocytes Relative 9 4 - 12 %    Eosinophils Relative 8 (H) 0 - 6 %    Basophils Relative 1 0 - 1 %    Neutrophils Absolute 2 89 1 85 - 7 62 Thousands/µL    Lymphocytes Absolute 1 16 0 60 - 4 47 Thousands/µL    Monocytes Absolute 0 42 0 17 - 1 22 Thousand/µL    Eosinophils Absolute 0 37 0 00 - 0 61 Thousand/µL    Basophils Absolute 0 07 0 00 - 0 10 Thousands/µL   Basic metabolic panel    Collection Time: 03/10/18 10:10 AM   Result Value Ref Range    Sodium 140 136 - 145 mmol/L    Potassium 4 2 3 5 - 5 3 mmol/L    Chloride 104 100 - 108 mmol/L    CO2 29 21 - 32 mmol/L    Anion Gap 7 4 - 13 mmol/L    BUN 13 5 - 25 mg/dL    Creatinine 0 96 0 60 - 1 30 mg/dL    Glucose, Fasting 110 (H) 65 - 99 mg/dL    Calcium 8 7 8 3 - 10 1 mg/dL    eGFR 87 ml/min/1 73sq m   ECG 12 lead    Collection Time: 03/12/18 12:17 PM   Result Value Ref Range    Ventricular Rate 82 BPM    Atrial Rate 82 BPM    TX Interval 154 ms    QRSD Interval 82 ms    QT Interval 370 ms    QTC Interval 432 ms    P [place] 38 degrees    QRS Axis -4 degrees    T Wave Axis 43 degrees   Platelet count    Collection Time: 03/14/18  9:33 AM   Result Value Ref Range    Platelets 259 194 - 483 Thousands/uL    MPV 8 9 8 9 - 12 7 fL       This note was created with voice recognition software  Phonic, grammatical and spelling errors may be present within the note as a result

## 2018-04-25 ENCOUNTER — TELEPHONE (OUTPATIENT)
Dept: INTERNAL MEDICINE CLINIC | Facility: CLINIC | Age: 58
End: 2018-04-25

## 2018-05-15 ENCOUNTER — OFFICE VISIT (OUTPATIENT)
Dept: INTERNAL MEDICINE CLINIC | Facility: CLINIC | Age: 58
End: 2018-05-15
Payer: COMMERCIAL

## 2018-05-15 VITALS
WEIGHT: 291 LBS | SYSTOLIC BLOOD PRESSURE: 150 MMHG | BODY MASS INDEX: 39.42 KG/M2 | RESPIRATION RATE: 20 BRPM | HEART RATE: 82 BPM | HEIGHT: 72 IN | DIASTOLIC BLOOD PRESSURE: 82 MMHG | OXYGEN SATURATION: 98 %

## 2018-05-15 DIAGNOSIS — G89.18 POST-OP PAIN: Primary | ICD-10-CM

## 2018-05-15 PROCEDURE — 99213 OFFICE O/P EST LOW 20 MIN: CPT | Performed by: PHYSICIAN ASSISTANT

## 2018-05-16 RX ORDER — METHYLPREDNISOLONE 4 MG/1
TABLET ORAL
Qty: 21 TABLET | Refills: 0 | Status: SHIPPED | OUTPATIENT
Start: 2018-05-16 | End: 2018-06-06 | Stop reason: ALTCHOICE

## 2018-05-16 NOTE — PROGRESS NOTES
Assessment/Plan:      Patient Instructions   Patient will try a Medrol Dosepak to see if this helps at all the upper abdominal pain  He will also stop his Robaxin as this is causing diarrhea  Schedule follow-up 2 weeks  Followup scheduled per orders  Diagnoses and all orders for this visit:    Post-op pain  -     Methylprednisolone 4 MG TBPK; Use as directed on package          Subjective:      Patient ID: Alonzo Essex is a 62 y o  male  Follow-up of persistent upper abdominal pain that has been present since his recent surgery  Patient reports to has been no improvement  He states in fact he thinks it is intensifying  He rates the pain as between 5 and 7 on the pain scale  He cannot bend forward to do any tasks like put on his shoes or socks  Riding in the car and going over a bumpy highway, he states causes significant pain  Also since last visit he has been having diarrhea every time he uses the bathroom and has fecal urgency  He has not had any incontinence  Nor has he noted any blood  He had been put on he thought meloxicam but I think it was the Robaxin  This could be causing his diarrhea  He was asked to stop the medication  I did have him obtain a 2nd surgical opinion  They felt that this would improve over time  They also said if he was not improving he was to make a follow-up which I did recommend he do          ALLERGIES:  Allergies   Allergen Reactions    Ceclor [Cefaclor] Hives       CURRENT MEDICATIONS:    Current Outpatient Prescriptions:     gabapentin (NEURONTIN) 100 mg capsule, Take 2 capsules 3 times a day, Disp: 180 capsule, Rfl: 1    lisinopril (ZESTRIL) 10 mg tablet, Take 12 5 mg by mouth daily  , Disp: , Rfl:     Methylprednisolone 4 MG TBPK, Use as directed on package, Disp: 21 tablet, Rfl: 0    ondansetron (ZOFRAN) 4 mg tablet, Take 1 tablet by mouth every 8 (eight) hours as needed for nausea or vomiting for up to 7 days, Disp: 12 tablet, Rfl: 0    ACTIVE PROBLEM LIST:  Patient Active Problem List   Diagnosis    Recurrent umbilical hernia    Generalized abdominal pain    Post-op pain    Hyperlipidemia    Hypertension    Obesity       PAST MEDICAL HISTORY:  Past Medical History:   Diagnosis Date    Hypertension     Kidney stone        PAST SURGICAL HISTORY:  Past Surgical History:   Procedure Laterality Date    CYSTOSCOPY W/ LASER LITHOTRIPSY      HERNIA REPAIR      x2    HIP DEBRIDEMENT Right     LAPAROSCOPY N/A 3/14/2018    Procedure: DIAGNOSTIC LAPAROSCOPY; LYSIS OF ADHESIONS;  Surgeon: Anselmo Barroso MD;  [facility];  Service: General    [place] LAP, VENTRAL HERNIA REPAIR,REDUCIBLE N/A 12/6/2017    Procedure: LAPAROSCOPIC UMBILICAL HERNIA REPAIR WITH MESH;  Surgeon: Anselmo Barroso MD;  [facility];  Service: General       FAMILY HISTORY:  Family History   Problem Relation Age of Onset    Hypertension Father     Cancer Father        SOCIAL HISTORY:  Social History     Social History    Marital status: /Civil Union     Spouse name: N/A    Number of children: N/A    Years of education: N/A     Occupational History    Not on file  Social History Main Topics    Smoking status: Current Every Day Smoker     Packs/day: 0 20     Types: Cigarettes    Smokeless tobacco: Never Used    Alcohol use Yes      Comment: social    Drug use: No    Sexual activity: Not on file     Other Topics Concern    Not on file     Social History Narrative    No narrative on file       Review of Systems   Constitutional: Negative for activity change, chills, fatigue and fever  HENT: Negative for congestion  Eyes: Negative for discharge  Respiratory: Negative for cough, chest tightness and shortness of breath  Cardiovascular: Negative for chest pain, palpitations and leg swelling  Gastrointestinal: Positive for abdominal pain and diarrhea  Negative for anal bleeding and blood in stool     Genitourinary: Negative for difficulty urinating, dysuria, frequency and urgency  Musculoskeletal: Negative for arthralgias and myalgias  Skin: Negative for rash  Allergic/Immunologic: Negative for immunocompromised state  Neurological: Negative for dizziness, syncope, weakness, light-headedness and headaches  Hematological: Negative for adenopathy  Does not bruise/bleed easily  Psychiatric/Behavioral: Negative for dysphoric mood  The patient is not nervous/anxious  Objective:  Vitals:    05/15/18 1541   BP: 150/82   BP Location: Left arm   Patient Position: Sitting   Cuff Size: Large   Pulse: 82   Resp: 20   SpO2: 98%   Weight: 132 kg (291 lb)   Height: 6' (1 829 m)        Physical Exam   Constitutional: He is oriented to person, place, and time  He appears well-developed and well-nourished  No distress  Cardiovascular: Normal rate, regular rhythm and normal heart sounds  Pulmonary/Chest: Effort normal and breath sounds normal    Abdominal: Bowel sounds are normal  He exhibits no distension and no mass  There is tenderness (Patient continues with acute tenderness of upper abdomen above the umbilicus  Any pressure on the umbilicus causes the pain  )  There is guarding  There is no rebound  Patient did have difficulty going from its sitting to supine and vice versa  He states his causes severe pain  Musculoskeletal: He exhibits no edema  Neurological: He is alert and oriented to person, place, and time  Skin: Skin is warm and dry  No rash noted  Psychiatric: He has a normal mood and affect  His behavior is normal    Nursing note and vitals reviewed  RESULTS:    No results found for this or any previous visit (from the past 1008 hour(s))  This note was created with voice recognition software  Phonic, grammatical and spelling errors may be present within the note as a result

## 2018-05-16 NOTE — PATIENT INSTRUCTIONS
Patient will try a Medrol Dosepak to see if this helps at all the upper abdominal pain  He will also stop his Robaxin as this is causing diarrhea  Schedule follow-up 2 weeks

## 2018-05-25 ENCOUNTER — TELEPHONE (OUTPATIENT)
Dept: INTERNAL MEDICINE CLINIC | Facility: CLINIC | Age: 58
End: 2018-05-25

## 2018-05-25 ENCOUNTER — TELEPHONE (OUTPATIENT)
Dept: PAIN MEDICINE | Facility: CLINIC | Age: 58
End: 2018-05-25

## 2018-05-25 NOTE — TELEPHONE ENCOUNTER
Dr Anthony King called from [facility] regarding patient  He was to see if you could review patient's chart and see if you could evaluate patient  Patient had abdominal surgery, umbilical hernia repair and removal of mesh by Dr Rick Laura  Patient has had continuous pain and seems to be getting worse  Dr Ramiro Chandler was inquiring to see maybe if a block would help and wanted to see if you would be willing to see patient  Or if this is something that you would see  Dr Ramiro Chandler would like a call back to let him know the outcome once the records are reviewed   Call back [phone]

## 2018-05-25 NOTE — TELEPHONE ENCOUNTER
Chris Baker spoke to Dr Rondi Cranker office directly to give more information on what is needed for patients condition treatment

## 2018-05-25 NOTE — TELEPHONE ENCOUNTER
Honestly, I am not sure of any pain management provider that deals with the type of pain Cheryl Pizano has  [facility] local pain specialist is Dr Libby Guevara  You possibly could call his office (give them the office number), speak with his nurse and see if this is something that he would evaluate [facility] for  The only other person I can think of it is Dr Landon Wang with [facility]

## 2018-05-25 NOTE — TELEPHONE ENCOUNTER
Called and left a message on Dr Ebenezer Mejía office answering machine to have someone return my call

## 2018-05-25 NOTE — TELEPHONE ENCOUNTER
Dr Marquez Co at [facility] only deals with back pain  Can you please refer patient to a pain doctor who can mange pain on the front torso where patient had his surgery? Rhonda Nath said that it was Andrew Chuchristine to call her cell phone and to leave a voice mail with the information if she does not  the phone   [phone]

## 2018-06-05 ENCOUNTER — TELEPHONE (OUTPATIENT)
Dept: INTERNAL MEDICINE CLINIC | Facility: CLINIC | Age: 58
End: 2018-06-05

## 2018-06-05 DIAGNOSIS — G89.18 POST-OP PAIN: Primary | ICD-10-CM

## 2018-06-05 NOTE — TELEPHONE ENCOUNTER
New pt intake started in call center  Per task, pt is scheduled for a CON with Dr Olivier Florez on 6/11   Pt will come in early to fill out pw

## 2018-06-05 NOTE — TELEPHONE ENCOUNTER
Patient needs a referral for pain management  He went there he just needs a referral for them from you   Can you put that in the system please

## 2018-06-06 ENCOUNTER — OFFICE VISIT (OUTPATIENT)
Dept: INTERNAL MEDICINE CLINIC | Facility: CLINIC | Age: 58
End: 2018-06-06
Payer: COMMERCIAL

## 2018-06-06 ENCOUNTER — TELEPHONE (OUTPATIENT)
Dept: INTERNAL MEDICINE CLINIC | Facility: CLINIC | Age: 58
End: 2018-06-06

## 2018-06-06 VITALS
BODY MASS INDEX: 38.74 KG/M2 | HEIGHT: 72 IN | RESPIRATION RATE: 20 BRPM | DIASTOLIC BLOOD PRESSURE: 90 MMHG | OXYGEN SATURATION: 95 % | HEART RATE: 79 BPM | SYSTOLIC BLOOD PRESSURE: 150 MMHG | TEMPERATURE: 98.6 F | WEIGHT: 286 LBS

## 2018-06-06 DIAGNOSIS — G89.18 POST-OP PAIN: ICD-10-CM

## 2018-06-06 DIAGNOSIS — I10 ESSENTIAL HYPERTENSION: Primary | ICD-10-CM

## 2018-06-06 DIAGNOSIS — R10.9 ABDOMINAL WALL PAIN: ICD-10-CM

## 2018-06-06 PROCEDURE — 99213 OFFICE O/P EST LOW 20 MIN: CPT | Performed by: PHYSICIAN ASSISTANT

## 2018-06-06 RX ORDER — GABAPENTIN 300 MG/1
300 CAPSULE ORAL 3 TIMES DAILY
Qty: 270 CAPSULE | Refills: 1 | Status: SHIPPED | OUTPATIENT
Start: 2018-06-06 | End: 2018-10-25

## 2018-06-06 RX ORDER — LISINOPRIL 20 MG/1
20 TABLET ORAL DAILY
Qty: 90 TABLET | Refills: 1 | Status: SHIPPED | OUTPATIENT
Start: 2018-06-06 | End: 2018-12-10 | Stop reason: SDUPTHER

## 2018-06-06 NOTE — PROGRESS NOTES
Assessment/Plan:      Patient Instructions   Will increase lisinopril to 20 mg daily for better blood pressure control  Patient to follow with pain management as scheduled  Schedule follow-up here 3 weeks to reassess blood pressure        Followup scheduled per orders  Diagnoses and all orders for this visit:    Essential hypertension  -     lisinopril (ZESTRIL) 20 mg tablet; Take 1 tablet (20 mg total) by mouth daily  -     Basic metabolic panel; Future    Post-op pain  -     gabapentin (NEURONTIN) 300 mg capsule; Take 1 capsule (300 mg total) by mouth 3 (three) times a day    Abdominal wall pain  -     gabapentin (NEURONTIN) 300 mg capsule; Take 1 capsule (300 mg total) by mouth 3 (three) times a day          Subjective:      Patient ID: Kathy Malave is a 62 y o  male  Follow-up    Postop pain/abdominal wall pain  Patient has been on gabapentin and has increased his dose to 300 milligrams 3 times a day  He feels that it may be helping to a slight degree  He did see the surgeon he had sought 2nd opinion from and the surgeon basically agreed with me that we should proceed to pain management to see if they can offer some help  There is really no change in the location of the pain, the intensity of the pain and the consistency of the pain  Hypertension:  BP is up and not at goal   We discussed increasing the lisinopril to 20 milligrams daily and he is in agreement  Stefany cp, palp, sob, edema, HA, dizziness, diaphoresis, syncope, visual disturbance          ALLERGIES:  Allergies   Allergen Reactions    Ceclor [Cefaclor] Hives       CURRENT MEDICATIONS:    Current Outpatient Prescriptions:     gabapentin (NEURONTIN) 300 mg capsule, Take 1 capsule (300 mg total) by mouth 3 (three) times a day, Disp: 270 capsule, Rfl: 1    lisinopril (ZESTRIL) 20 mg tablet, Take 1 tablet (20 mg total) by mouth daily, Disp: 90 tablet, Rfl: 1    ACTIVE PROBLEM LIST:  Patient Active Problem List   Diagnosis    Recurrent umbilical hernia    Generalized abdominal pain    Post-op pain    Hyperlipidemia    Hypertension    Obesity    Abdominal wall pain       PAST MEDICAL HISTORY:  Past Medical History:   Diagnosis Date    Hypertension     Kidney stone        PAST SURGICAL HISTORY:  Past Surgical History:   Procedure Laterality Date    CYSTOSCOPY W/ LASER LITHOTRIPSY      HERNIA REPAIR      x2    HIP DEBRIDEMENT Right     LAPAROSCOPY N/A 3/14/2018    Procedure: DIAGNOSTIC LAPAROSCOPY; LYSIS OF ADHESIONS;  Surgeon: Bib Tyler MD;  [facility];  Service: General    [place] LAP, VENTRAL HERNIA REPAIR,REDUCIBLE N/A 12/6/2017    Procedure: LAPAROSCOPIC UMBILICAL HERNIA REPAIR WITH MESH;  Surgeon: Bib Tyler MD;  [facility];  Service: General       FAMILY HISTORY:  Family History   Problem Relation Age of Onset    Hypertension Father     Cancer Father        SOCIAL HISTORY:  Social History     Social History    Marital status: /Civil Union     Spouse name: N/A    Number of children: N/A    Years of education: N/A     Occupational History    Not on file  Social History Main Topics    Smoking status: Current Every Day Smoker     Packs/day: 0 20     Types: Cigarettes    Smokeless tobacco: Never Used    Alcohol use Yes      Comment: social    Drug use: No    Sexual activity: Not on file     Other Topics Concern    Not on file     Social History Narrative    No narrative on file       Review of Systems   Constitutional: Negative for activity change, chills, fatigue and fever  HENT: Negative for congestion  Eyes: Negative for discharge  Respiratory: Negative for cough, chest tightness and shortness of breath  Cardiovascular: Negative for chest pain, palpitations and leg swelling  Gastrointestinal: Positive for abdominal pain  Negative for constipation, diarrhea, nausea and vomiting  Genitourinary: Negative for difficulty urinating     Musculoskeletal: Negative for arthralgias and myalgias  Skin: Negative for rash  Allergic/Immunologic: Negative for immunocompromised state  Neurological: Negative for dizziness, syncope, weakness, light-headedness and headaches  Hematological: Negative for adenopathy  Does not bruise/bleed easily  Psychiatric/Behavioral: Negative for dysphoric mood  The patient is not nervous/anxious  Objective:  Vitals:    06/06/18 1549   BP: 150/90   BP Location: Right arm   Patient Position: Sitting   Cuff Size: Adult   Pulse: 79   Resp: 20   Temp: 98 6 °F (37 °C)   TempSrc: Tympanic   SpO2: 95%   Weight: 130 kg (286 lb)   Height: 6' (1 829 m)        Physical Exam   Constitutional: He is oriented to person, place, and time  He appears well-developed and well-nourished  No distress  Neck: Neck supple  Cardiovascular: Normal rate, regular rhythm and normal heart sounds  Pulmonary/Chest: Effort normal and breath sounds normal    Abdominal: Soft  Bowel sounds are normal  There is tenderness (Persistent tenderness in the periumbilical area both sides at the rectus abdominus extending laterally, no significant tenderness above or be low this area)  Musculoskeletal: He exhibits no edema  Lymphadenopathy:     He has no cervical adenopathy  Neurological: He is alert and oriented to person, place, and time  Skin: Skin is warm and dry  No rash noted  Psychiatric: He has a normal mood and affect  His behavior is normal    Nursing note and vitals reviewed  RESULTS:    No results found for this or any previous visit (from the past 1008 hour(s))  This note was created with voice recognition software  Phonic, grammatical and spelling errors may be present within the note as a result

## 2018-06-06 NOTE — TELEPHONE ENCOUNTER
Faxed over referral to Dr Jt Villavicencio office [phone] referral  Patient has appointment on 06/11/18

## 2018-06-06 NOTE — PATIENT INSTRUCTIONS
Will increase lisinopril to 20 mg daily for better blood pressure control  Patient to follow with pain management as scheduled    Schedule follow-up here 3 weeks to reassess blood pressure

## 2018-06-11 ENCOUNTER — CONSULT (OUTPATIENT)
Dept: PAIN MEDICINE | Facility: CLINIC | Age: 58
End: 2018-06-11
Payer: COMMERCIAL

## 2018-06-11 VITALS
HEIGHT: 72 IN | WEIGHT: 286 LBS | BODY MASS INDEX: 38.74 KG/M2 | HEART RATE: 82 BPM | RESPIRATION RATE: 18 BRPM | DIASTOLIC BLOOD PRESSURE: 84 MMHG | SYSTOLIC BLOOD PRESSURE: 116 MMHG

## 2018-06-11 DIAGNOSIS — G89.29 CHRONIC GENERALIZED ABDOMINAL PAIN: ICD-10-CM

## 2018-06-11 DIAGNOSIS — R10.84 CHRONIC GENERALIZED ABDOMINAL PAIN: ICD-10-CM

## 2018-06-11 DIAGNOSIS — G89.4 CHRONIC PAIN SYNDROME: Primary | ICD-10-CM

## 2018-06-11 PROCEDURE — 99204 OFFICE O/P NEW MOD 45 MIN: CPT | Performed by: ANESTHESIOLOGY

## 2018-06-11 NOTE — PROGRESS NOTES
Assessment:  1  Chronic pain syndrome     2  Chronic generalized abdominal pain - Bilateral  Ambulatory referral to Pain Management     Plan:  Patient is a 59-year-old male who presents today for initial consultation for management of chronic abdominal pain status post multiple abdominal umbilical hernia repairs  Most recent umbilical hernia repair was back in March 2018 for excision of her adhesions  Patient continues to complain of chronic abdominal pain despite surgeries  He is currently on gabapentin titrated dose 400 mg p o  t I d     Today, I discussed with patient regarding performing an US guided bilateral transversus abdominal plane block  I explained the procedure to the patient in detail and answered his questions to satisfaction  Risk of the procedure include bleeding, infection, nerve and/or possibly local anesthetic toxicity  Symptoms of local anesthetic toxicity includes tachycardia, perioral numbness, tinnitus, and seizures  Patient verbalizes understanding  Patient was given a brochure to review regarding the procedure  We will give patient a call to schedule the procedure accordingly  My impressions and treatment recommendations were discussed in detail with the patient who verbalized understanding and had no further questions  Discharge instructions were provided  I personally saw and examined the patient and I agree with the above discussed plan of care  History of Present Illness:    Aime Sun is a 62 y o  male Who presents today with chronic abdominal pain  Of note, patient reports prior abdominal surgery from the local hernia repair  He has had 3 abdominal surgeries (all laparoscopic)  Today, patient reports moderate pain which ranges from 4 to 7/10 on a pain scale  His pain is constant, with no typical pattern  Patient further describes pain as sharp, dull/ achy in nature and pressure-like    Pain radiates across his abdominal wall, and is primarily on the left side of the lower quadrant of the abdomen  His pain is aggravated with bending, and exercise, and coughing and sneezing  Patient is currently on gabapentin 400 mg p o  t I d  Patient denies any relief with gabapentin  Patient denies nausea, he denies fever or chills  Patient states that he was told by the surgeon that he has multiple adhesions which is contributing to his pain and the more abdominal surgeries he had the more pain he gets  I have personally reviewed and/or updated the patient's past medical history, past surgical history, family history, social history, current medications, allergies, and vital signs today  Review of Systems:    Review of Systems   Constitutional: Negative for fever and unexpected weight change  HENT: Negative for trouble swallowing  Eyes: Negative for visual disturbance  Respiratory: Negative for shortness of breath and wheezing  Cardiovascular: Negative for chest pain and palpitations  Gastrointestinal: Negative for constipation, diarrhea, nausea and vomiting  Endocrine: Negative for cold intolerance, heat intolerance and polydipsia  Genitourinary: Negative for difficulty urinating and frequency  Musculoskeletal: Negative for arthralgias, gait problem, joint swelling and myalgias  Skin: Negative for rash  Neurological: Negative for dizziness, seizures, syncope, weakness and headaches  Hematological: Does not bruise/bleed easily  Psychiatric/Behavioral: Negative for dysphoric mood  All other systems reviewed and are negative        Patient Active Problem List   Diagnosis    Recurrent umbilical hernia    Generalized abdominal pain    Post-op pain    Hyperlipidemia    Hypertension    Obesity    Abdominal wall pain       Past Medical History:   Diagnosis Date    Hypertension     Kidney stone        Past Surgical History:   Procedure Laterality Date    CYSTOSCOPY W/ LASER LITHOTRIPSY      HERNIA REPAIR      x2    HIP DEBRIDEMENT Right     LAPAROSCOPY N/A 3/14/2018    Procedure: DIAGNOSTIC LAPAROSCOPY; LYSIS OF ADHESIONS;  Surgeon: Tamar Villalba MD;  [facility];  Service: General    AK LAP, VENTRAL HERNIA REPAIR,REDUCIBLE N/A 12/6/2017    Procedure: LAPAROSCOPIC UMBILICAL HERNIA REPAIR WITH MESH;  Surgeon: Tamar Villalba MD;  [facility];  Service: General       Family History   Problem Relation Age of Onset    Hypertension Father     Cancer Father        Social History     Occupational History    Not on file  Social History Main Topics    Smoking status: Current Every Day Smoker     Packs/day: 0 20     Types: Cigarettes    Smokeless tobacco: Never Used    Alcohol use Yes      Comment: social    Drug use: No    Sexual activity: Not on file       Current Outpatient Prescriptions on File Prior to Visit   Medication Sig    gabapentin (NEURONTIN) 300 mg capsule Take 1 capsule (300 mg total) by mouth 3 (three) times a day    lisinopril (ZESTRIL) 20 mg tablet Take 1 tablet (20 mg total) by mouth daily     No current facility-administered medications on file prior to visit  Allergies   Allergen Reactions    Ceclor [Cefaclor] Hives       Physical Exam:    /84   Pulse 82   Resp 18   Ht 6' (1 829 m)   Wt 130 kg (286 lb)   BMI 38 79 kg/m²     Constitutional: normal, well developed, well nourished, alert, in no distress and non-toxic and no overt pain behavior  Eyes: anicteric  HEENT: grossly intact  Neck: supple, symmetric, trachea midline and no masses   Pulmonary:even and unlabored  Cardiovascular:No edema or pitting edema present  Skin:Normal without rashes or lesions and well hydrated  Psychiatric:Mood and affect appropriate  Neurologic:Cranial Nerves II-XII grossly intact  Musculoskeletal:normal    Abdominal Exam: Tenderness to palpation in the periumbilical region and left lower quadrant  Imaging  CT ABDOMEN AND PELVIS WITH IV CONTRAST     INDICATION:  Abdominal pain    History of hernia repair last month with weight loss      COMPARISON: 10/31/2017     TECHNIQUE:  CT examination of the abdomen and pelvis was performed  Reformatted images were created in axial, sagittal, and coronal planes        Radiation dose length product (DLP) for this visit:  4116 mGy-cm   This examination, like all CT scans performed in the [facility], was performed utilizing techniques to minimize radiation dose exposure, including the use of iterative   reconstruction and automated exposure control      IV Contrast:  100 mL of iohexol (OMNIPAQUE)           Enteric Contrast:  Enteric contrast was administered      FINDINGS:     ABDOMEN     LOWER CHEST:  No significant abnormalities identified in the lower chest      LIVER/BILIARY TREE:  Stable tiny cysts scattered throughout the liver      GALLBLADDER:  No calcified gallstones  No pericholecystic inflammatory change      SPLEEN:  Unremarkable      PANCREAS:  Unremarkable      ADRENAL GLANDS:  Unremarkable      KIDNEYS/URETERS:  Stable nonobstructing intrarenal calculi bilaterally  Stable complex left renal cysts  No hydronephrosis      STOMACH AND BOWEL:  Unremarkable      APPENDIX:  No findings to suggest appendicitis      ABDOMINOPELVIC CAVITY:  No ascites or free intraperitoneal air  No lymphadenopathy      VESSELS:  Unremarkable for patient's age      PELVIS     REPRODUCTIVE ORGANS:  Unremarkable for patient's age      URINARY BLADDER:  Unremarkable      ABDOMINAL WALL/INGUINAL REGIONS:  Umbilical hernia repair with intact mesh  No recurrent hernia      OSSEOUS STRUCTURES:  No acute fracture or destructive osseous lesion      IMPRESSION:     Umbilical hernia repair with intact mesh    No recurrent hernia      Stable bilateral nonobstructing intrarenal calculi and complex left renal cysts

## 2018-06-15 ENCOUNTER — TELEPHONE (OUTPATIENT)
Dept: PAIN MEDICINE | Facility: MEDICAL CENTER | Age: 58
End: 2018-06-15

## 2018-06-15 NOTE — TELEPHONE ENCOUNTER
Pt's wife called stating that the patient was seen in the office for a consultation on 6/11 and Dr Monika Hernandez told him that [place] [facility] would be contacting him to schedule an  injection  He has not heard anything yet and the pain is getting worse  Pt is anxious to get back to work and would like to schedule the procedure as soon as possible  Pt can be reached at [phone]

## 2018-06-17 DIAGNOSIS — G89.18 POST-OP PAIN: ICD-10-CM

## 2018-06-17 DIAGNOSIS — R10.9 ABDOMINAL WALL PAIN: ICD-10-CM

## 2018-06-17 RX ORDER — GABAPENTIN 100 MG/1
CAPSULE ORAL
Qty: 180 CAPSULE | Refills: 1 | Status: SHIPPED | OUTPATIENT
Start: 2018-06-17 | End: 2018-10-25

## 2018-06-22 ENCOUNTER — TELEPHONE (OUTPATIENT)
Dept: INTERNAL MEDICINE CLINIC | Facility: CLINIC | Age: 58
End: 2018-06-22

## 2018-06-22 ENCOUNTER — PROCEDURE VISIT (OUTPATIENT)
Dept: PAIN MEDICINE | Facility: CLINIC | Age: 58
End: 2018-06-22
Payer: COMMERCIAL

## 2018-06-22 VITALS
HEIGHT: 72 IN | WEIGHT: 286 LBS | HEART RATE: 82 BPM | RESPIRATION RATE: 18 BRPM | DIASTOLIC BLOOD PRESSURE: 98 MMHG | BODY MASS INDEX: 38.74 KG/M2 | SYSTOLIC BLOOD PRESSURE: 126 MMHG

## 2018-06-22 DIAGNOSIS — G89.29 CHRONIC ABDOMINAL PAIN: Primary | ICD-10-CM

## 2018-06-22 DIAGNOSIS — R10.9 CHRONIC ABDOMINAL PAIN: Primary | ICD-10-CM

## 2018-06-22 PROCEDURE — 64488 TAP BLOCK BI INJECTION: CPT | Performed by: ANESTHESIOLOGY

## 2018-06-22 RX ORDER — BUPIVACAINE HYDROCHLORIDE 2.5 MG/ML
10 INJECTION, SOLUTION INFILTRATION; PERINEURAL ONCE
Status: COMPLETED | OUTPATIENT
Start: 2018-06-22 | End: 2018-06-22

## 2018-06-22 RX ORDER — LIDOCAINE HYDROCHLORIDE 20 MG/ML
10 INJECTION, SOLUTION EPIDURAL; INFILTRATION; INTRACAUDAL; PERINEURAL ONCE
Status: COMPLETED | OUTPATIENT
Start: 2018-06-22 | End: 2018-06-22

## 2018-06-22 RX ADMIN — LIDOCAINE HYDROCHLORIDE 10 ML: 20 INJECTION, SOLUTION EPIDURAL; INFILTRATION; INTRACAUDAL; PERINEURAL at 17:07

## 2018-06-22 RX ADMIN — BUPIVACAINE HYDROCHLORIDE 10 ML: 2.5 INJECTION, SOLUTION INFILTRATION; PERINEURAL at 17:04

## 2018-06-22 RX ADMIN — Medication 10 MG: at 17:05

## 2018-06-22 NOTE — PROGRESS NOTES
Pre-procedure Diagnosis: Chronic Abdominal Pain   Post-procedure Diagnosis: Chronic Abdominal Pain   Procedure Title(s):  1  Right and Left US guided Transversus Abdominal Plane Block       2  Ultrasound Guided   Attending Surgeon:   Ken Jennings MD  Anesthesia:   Local    Procedure Note:  The patients history and physical exam were reviewed  I explained the details of the procedure to the patient including the risks, benefits and other alternatives  We had an informed discussion and the patient verbalized understanding and signed the consent form  All questions were answered appropriately  The ultrasound probe is placed in a transverse plane to the lateral abdominal wall in the midaxillary line, between the lower costal margin and iliac crest  The use of ultrasound allows for accurate deposition of the local anaesthetic in the correct neurovascular plane - between the internal oblique muscle and the transversus abdominus muscle  Requirements: Ultrasound machine with a high frequency probe (10 MHz); Needle: 50mm needle; 10 ml syringe x 2; 10 ml local anaesthetic 0 25% Bupivacaine, and 2% Lidocaine; dexamethasone 10mg/ml x 2 - 2cc  Procedure - Right TAP Block: The desired area of target (between the coastal margin and the iliac crest) was sterilely prepped and draped  3cc of 2% lidocaine was used to anesthesized the region  The stimuplex needle was then introduced in plane of the ultrasound probe directly under the probe and advanced until it reaches the plane between the internal oblique and transversus abdominis muscles  Upon reaching the plane, 3 ml of 2% lidocaine was injected to confirm correct needle position after which 8 ml of local anaesthetic solution (2% lidocaine 4cc's plus 0 25% bupivacaine 4cc's) with 10mg dexamethasone was slowly injected  The transversus abdominis plane was visualized expanding with the injection (this appeared as a hypoechoic space)       The procedure was repeated on the Left in the same manner  Disposition:  The patient reported immediate pain relief  There was no complications  He was discharged home to follow up in 4 weeks

## 2018-06-26 ENCOUNTER — TELEPHONE (OUTPATIENT)
Dept: INTERNAL MEDICINE CLINIC | Facility: CLINIC | Age: 58
End: 2018-06-26

## 2018-06-26 NOTE — TELEPHONE ENCOUNTER
Called patient and left message to return call  If patient calls please schedule a follow up appointment with Chris Coppola as requested by the provider to fill out [employer] form together at the time of visit

## 2018-06-28 ENCOUNTER — TELEPHONE (OUTPATIENT)
Dept: RADIOLOGY | Facility: CLINIC | Age: 58
End: 2018-06-28

## 2018-06-28 NOTE — TELEPHONE ENCOUNTER
S/w pt  Pt reports the first few days post procedure pain was numbed, but feels like relief has worn off at this point  Pain remains across the abdomen  Pt rates it a 4 in the AM and states by end of day pain is an 8/10  No f/u scheduled  Please advise

## 2018-07-03 ENCOUNTER — TELEPHONE (OUTPATIENT)
Dept: INTERNAL MEDICINE CLINIC | Facility: CLINIC | Age: 58
End: 2018-07-03

## 2018-07-03 NOTE — TELEPHONE ENCOUNTER
Called patient and left message to call us back  Blood work orders pending prior to next appointment

## 2018-07-05 ENCOUNTER — OFFICE VISIT (OUTPATIENT)
Dept: INTERNAL MEDICINE CLINIC | Facility: CLINIC | Age: 58
End: 2018-07-05
Payer: COMMERCIAL

## 2018-07-05 VITALS
TEMPERATURE: 98.5 F | OXYGEN SATURATION: 97 % | BODY MASS INDEX: 36.9 KG/M2 | HEART RATE: 86 BPM | WEIGHT: 278.4 LBS | DIASTOLIC BLOOD PRESSURE: 86 MMHG | RESPIRATION RATE: 18 BRPM | HEIGHT: 73 IN | SYSTOLIC BLOOD PRESSURE: 118 MMHG

## 2018-07-05 DIAGNOSIS — G89.4 CHRONIC PAIN SYNDROME: ICD-10-CM

## 2018-07-05 DIAGNOSIS — G89.18 POST-OP PAIN: ICD-10-CM

## 2018-07-05 DIAGNOSIS — R10.9 ABDOMINAL WALL PAIN: Primary | ICD-10-CM

## 2018-07-05 DIAGNOSIS — R10.84 GENERALIZED ABDOMINAL PAIN: ICD-10-CM

## 2018-07-05 PROCEDURE — 99213 OFFICE O/P EST LOW 20 MIN: CPT | Performed by: PHYSICIAN ASSISTANT

## 2018-07-05 NOTE — PATIENT INSTRUCTIONS
Patient is to follow with pain management as scheduled on Monday  Further disposition pending their evaluation and recommendations

## 2018-07-05 NOTE — PROGRESS NOTES
Assessment/Plan:          Return in about 5 weeks (around 8/9/2018) for Recheck  Diagnoses and all orders for this visit:    Abdominal wall pain    Chronic pain syndrome    Generalized abdominal pain    Post-op pain          Subjective:      Patient ID: Flaco Titus is a 62 y o  male  Follow-up    Ongoing postop abdominal pain, chronic pain syndrome, abdominal wall pain  Despite use of gabapentin 1200 mg daily and having had on 06/22/2018 right and left ultrasound-guided transversus abdominal pain block the patient has obtained no relief  He continues with mid abdominal pain that he describes pretty much as constant at least an 8 of 10, made worse by movement, riding in a car going over a bumpy pavement, sometimes eating, any attempt at lifting, pushing or pulling  I have noticed he is now sitting in a hunched over position, and when he walks he actually is walking slightly hunched over  If he straightens up he complains that will aggravate his pain syndrome  Lying supine is his most comfortable position  He has seen a 2nd opinion in surgery which did not have much else to offer other than what we are doing  He denies any diarrhea or constipation            ALLERGIES:  Allergies   Allergen Reactions    Ceclor [Cefaclor] Hives       CURRENT MEDICATIONS:    Current Outpatient Prescriptions:     gabapentin (NEURONTIN) 100 mg capsule, TAKE 2 CAPSULES 3 TIMES A DAY, Disp: 180 capsule, Rfl: 1    gabapentin (NEURONTIN) 300 mg capsule, Take 1 capsule (300 mg total) by mouth 3 (three) times a day, Disp: 270 capsule, Rfl: 1    lisinopril (ZESTRIL) 20 mg tablet, Take 1 tablet (20 mg total) by mouth daily, Disp: 90 tablet, Rfl: 1    ACTIVE PROBLEM LIST:  Patient Active Problem List   Diagnosis    Recurrent umbilical hernia    Generalized abdominal pain    Post-op pain    Hyperlipidemia    Hypertension    Obesity    Abdominal wall pain    Chronic pain syndrome       PAST MEDICAL HISTORY:  Past Medical History:   Diagnosis Date    Hypertension     Hypertension     Kidney stone     Recurrent umbilical hernia with incarceration     resolved 12/11/2017       PAST SURGICAL HISTORY:  Past Surgical History:   Procedure Laterality Date    CYSTOSCOPY W/ LASER LITHOTRIPSY      HERNIA REPAIR      x2    HIP DEBRIDEMENT Right     incision and drainage of skin of abcess hip    LAPAROSCOPY N/A 3/14/2018    Procedure: DIAGNOSTIC LAPAROSCOPY; LYSIS OF ADHESIONS;  Surgeon: Sahil Ramirez MD;  [facility];  Service: General    AK LAP, VENTRAL HERNIA REPAIR,REDUCIBLE N/A 12/6/2017    Procedure: LAPAROSCOPIC UMBILICAL HERNIA REPAIR WITH MESH;  Surgeon: Sahil Ramirez MD;  [facility];  Service: General       FAMILY HISTORY:  Family History   Problem Relation Age of Onset    Hypertension Father     Cancer Father         throat        SOCIAL HISTORY:  Social History     Social History    Marital status: /Civil Union     Spouse name: N/A    Number of children: N/A    Years of education: N/A     Occupational History    Not on file  Social History Main Topics    Smoking status: Current Every Day Smoker     Packs/day: 0 20     Types: Cigarettes    Smokeless tobacco: Never Used    Alcohol use Yes      Comment: social    Drug use: No    Sexual activity: Not on file     Other Topics Concern    Not on file     Social History Narrative    No narrative on file       Review of Systems   Constitutional: Negative for activity change, chills, fatigue and fever  HENT: Negative for congestion  Eyes: Negative for discharge  Respiratory: Negative for cough, chest tightness and shortness of breath  Cardiovascular: Negative for chest pain, palpitations and leg swelling  Gastrointestinal: Positive for abdominal pain  Negative for blood in stool, constipation, diarrhea, nausea and vomiting  Genitourinary: Negative for difficulty urinating and testicular pain     Musculoskeletal: Negative for arthralgias and myalgias  Skin: Negative for rash  Allergic/Immunologic: Negative for immunocompromised state  Neurological: Negative for dizziness, syncope, weakness, light-headedness and headaches  Hematological: Negative for adenopathy  Does not bruise/bleed easily  Psychiatric/Behavioral: Negative for dysphoric mood  The patient is not nervous/anxious  Objective:  Vitals:    07/05/18 1502   BP: 118/86   BP Location: Left arm   Patient Position: Sitting   Cuff Size: Adult   Pulse: 86   Resp: 18   Temp: 98 5 °F (36 9 °C)   TempSrc: Tympanic   SpO2: 97%   Weight: 126 kg (278 lb 6 4 oz)   Height: 6' 1" (1 854 m)        Physical Exam   Constitutional: He is oriented to person, place, and time  He appears well-developed and well-nourished  No distress  Obese, patient has dropped weight  Neck: Neck supple  Cardiovascular: Normal rate, regular rhythm and normal heart sounds  Pulmonary/Chest: Effort normal and breath sounds normal    Abdominal: Soft  Bowel sounds are normal  There is tenderness  Continues to complain of tenderness on any palpation of rectus abdominis muscles and just slightly below the umbilicus to the left  Patient does guard on palpation at these areas  Musculoskeletal: He exhibits no edema  Lymphadenopathy:     He has no cervical adenopathy  Neurological: He is alert and oriented to person, place, and time  Skin: Skin is warm and dry  No rash noted  Psychiatric: He has a normal mood and affect  His behavior is normal    Nursing note and vitals reviewed  RESULTS:    No results found for this or any previous visit (from the past 1008 hour(s))  This note was created with voice recognition software  Phonic, grammatical and spelling errors may be present within the note as a result

## 2018-07-09 ENCOUNTER — OFFICE VISIT (OUTPATIENT)
Dept: PAIN MEDICINE | Facility: CLINIC | Age: 58
End: 2018-07-09
Payer: COMMERCIAL

## 2018-07-09 VITALS
SYSTOLIC BLOOD PRESSURE: 116 MMHG | HEIGHT: 73 IN | BODY MASS INDEX: 36.84 KG/M2 | DIASTOLIC BLOOD PRESSURE: 78 MMHG | RESPIRATION RATE: 18 BRPM | HEART RATE: 78 BPM | WEIGHT: 278 LBS

## 2018-07-09 DIAGNOSIS — R10.84 GENERALIZED ABDOMINAL PAIN: ICD-10-CM

## 2018-07-09 DIAGNOSIS — G89.4 CHRONIC PAIN SYNDROME: Primary | ICD-10-CM

## 2018-07-09 PROCEDURE — 99214 OFFICE O/P EST MOD 30 MIN: CPT | Performed by: ANESTHESIOLOGY

## 2018-07-09 NOTE — PROGRESS NOTES
Assessment:  1  Chronic pain syndrome - Bilateral     2  Generalized abdominal pain - Bilateral  FL spine and pain procedure       Plan:  Patient is a 62 y o male who presents with Chronic abdominal pain following multiple abdominal surgeries for adhesions, umbilical hernia  The patient currently presents with ongoing dull achy pressure-like pain  He recently had a transversus abdominal plane block without much relief of pain  He is currently on gabapentin 400 mg p o  T i d       Today, we discussed other alternatives to help manage his symptoms  I discussed with patient regarding performing the bilateral splanchnic nerve block at T12 to help relieve his symptoms  I discussed with patient the procedure in detail including the risk and the benefits  Complete risks and benefits including bleeding, infection, tissue reaction, nerve injury and allergic reaction were discussed  The approach was demonstrated using models and literature was provided  Verbal and written consent was obtained  My impressions and treatment recommendations were discussed in detail with the patient who verbalized understanding and had no further questions  Discharge instructions were provided  I personally saw and examined the patient and I agree with the above discussed plan of care  History of Present Illness:  Sixto Ignacio is a 62 y o  male who presents for a follow up office visit in regards to Abdominal Pain  The patients current symptoms include bilateral abdominal lower quadrant abdominal pain  Patient had a TAP block 4 weeks ago  He states that he still has ongoing pain  He states that his pain is sharp in nature rated 9/10  He states that he is willing to try the injection again  He states that his pain is dull/achy and deep  He states that he takes gabapentin 400mg po TID       I have personally reviewed and/or updated the patient's past medical history, past surgical history, family history, social history, current medications, allergies, and vital signs today  Review of Systems   Respiratory: Negative for shortness of breath  Cardiovascular: Negative for chest pain  Gastrointestinal: Negative for constipation, diarrhea, nausea and vomiting  Musculoskeletal: Positive for gait problem  Negative for arthralgias, joint swelling and myalgias  Decreased ROM   Skin: Negative for rash  Neurological: Negative for dizziness, seizures and weakness  All other systems reviewed and are negative  Patient Active Problem List   Diagnosis    Recurrent umbilical hernia    Generalized abdominal pain    Post-op pain    Hyperlipidemia    Hypertension    Obesity    Abdominal wall pain    Chronic pain syndrome       Past Medical History:   Diagnosis Date    Hypertension     Hypertension     Kidney stone     Recurrent umbilical hernia with incarceration     resolved 12/11/2017       Past Surgical History:   Procedure Laterality Date    CYSTOSCOPY W/ LASER LITHOTRIPSY      HERNIA REPAIR      x2    HIP DEBRIDEMENT Right     incision and drainage of skin of abcess hip    LAPAROSCOPY N/A 3/14/2018    Procedure: DIAGNOSTIC LAPAROSCOPY; LYSIS OF ADHESIONS;  Surgeon: Keegan Lozada MD;  [facility];  Service: General    [place] LAP, VENTRAL HERNIA REPAIR,REDUCIBLE N/A 12/6/2017    Procedure: LAPAROSCOPIC UMBILICAL HERNIA REPAIR WITH MESH;  Surgeon: Keegan Lozada MD;  [facility];  Service: General       Family History   Problem Relation Age of Onset    Hypertension Father     Cancer Father         throat        Social History     Occupational History    Not on file       Social History Main Topics    Smoking status: Current Every Day Smoker     Packs/day: 0 20     Types: Cigarettes    Smokeless tobacco: Never Used    Alcohol use Yes      Comment: social    Drug use: No    Sexual activity: Not on file       Current Outpatient Prescriptions on File Prior to Visit   Medication Sig    gabapentin (NEURONTIN) 100 mg capsule TAKE 2 CAPSULES 3 TIMES A DAY    gabapentin (NEURONTIN) 300 mg capsule Take 1 capsule (300 mg total) by mouth 3 (three) times a day    lisinopril (ZESTRIL) 20 mg tablet Take 1 tablet (20 mg total) by mouth daily     No current facility-administered medications on file prior to visit  Allergies   Allergen Reactions    Ceclor [Cefaclor] Hives       Physical Exam:    /78   Pulse 78   Resp 18   Ht 6' 1" (1 854 m)   Wt 126 kg (278 lb)   BMI 36 68 kg/m²     Constitutional:normal, well developed, well nourished, alert, in no distress and non-toxic and no overt pain behavior  Eyes:anicteric  HEENT:grossly intact  Neck:supple, symmetric, trachea midline and no masses   Pulmonary:even and unlabored  Cardiovascular:No edema or pitting edema present  Skin:Normal without rashes or lesions and well hydrated  Psychiatric:Mood and affect appropriate  Neurologic:Cranial Nerves II-XII grossly intact  Musculoskeletal:normal    Abdomen Tenderness at the right and left lower quadrant      Imaging

## 2018-07-12 ENCOUNTER — TELEPHONE (OUTPATIENT)
Dept: PAIN MEDICINE | Facility: MEDICAL CENTER | Age: 58
End: 2018-07-12

## 2018-07-12 ENCOUNTER — TELEPHONE (OUTPATIENT)
Dept: INTERNAL MEDICINE CLINIC | Facility: CLINIC | Age: 58
End: 2018-07-12

## 2018-07-12 NOTE — TELEPHONE ENCOUNTER
Jayson Rubio stated Bradley Elke is sending us another form due to the nurses not believing scot needs disability and that he does not have any limitations  Jayson Rubio was requesting if we would write a letter to attach to the form why and what Scot disabilities are  She also noted he was seen by [facility] [place] will put him under anesthesia and give him another nerve blocker through the spine  She states this letter is urgent  [phone]) [phone]  Claim #[id number]        -I have typed up the last note you had written from the last [employer] form in the office  I have added the plan of Melissa Hernandez into it if you would just like to sign that and send it over attached with the form? Please let me know

## 2018-07-12 NOTE — TELEPHONE ENCOUNTER
Thanks for your help on this  There is not much more I can add than what is present in the typed letter

## 2018-07-12 NOTE — TELEPHONE ENCOUNTER
Pt wife is calling asking if pt most recent office note from 7/9 and a note regarding the injection that was discussed at that visit and why it is needed to [employer] disability  Fax # [phone] and the claim # [id number] will need to be added for reference

## 2018-07-13 PROBLEM — R10.84 ABDOMINAL PAIN, GENERALIZED: Status: ACTIVE | Noted: 2018-07-13

## 2018-07-17 ENCOUNTER — TRANSITIONAL CARE MANAGEMENT (OUTPATIENT)
Dept: PAIN MEDICINE | Facility: CLINIC | Age: 58
End: 2018-07-17

## 2018-07-17 NOTE — TELEPHONE ENCOUNTER
Call from Zenaida Elliott, Wife   Call back # [phone]  Dr Ros Elizondo stated [phone] faxed over a form that needs to be completed today or it will be cancelled  The form is in the chart scanned in under Media on 07/13/18

## 2018-07-18 ENCOUNTER — TELEPHONE (OUTPATIENT)
Dept: INTERNAL MEDICINE CLINIC | Facility: CLINIC | Age: 58
End: 2018-07-18

## 2018-07-19 ENCOUNTER — TELEPHONE (OUTPATIENT)
Dept: SURGERY | Facility: CLINIC | Age: 58
End: 2018-07-19

## 2018-07-19 NOTE — TELEPHONE ENCOUNTER
Pt's wife called upset, due to the fact that Dr Carroll Sheehan cleared the pt to return to work without restriction on his disability paper work  The wife stated that this was never mentioned to the pt  I spoke with Dr Carroll Sheehan who stated that the pt was aware of the above  And that the was referred to his pcp for continuation of care  I informed the pt's wife of this who then stated that that was not the case, and ended to conversation

## 2018-07-25 ENCOUNTER — TELEPHONE (OUTPATIENT)
Dept: PAIN MEDICINE | Facility: MEDICAL CENTER | Age: 58
End: 2018-07-25

## 2018-07-25 DIAGNOSIS — G89.4 CHRONIC PAIN SYNDROME: Primary | ICD-10-CM

## 2018-07-25 NOTE — TELEPHONE ENCOUNTER
Call from Yuan Avila, Wife   Call back # [phone]  Dr Suzi Rico would like to know why Dr Jose Rafael Tolentino released patient to return to work when the patient cannot walk/push/pull  Per Yuan Avila patient has now lost long term disability due to this  Yuan Avila would like a call back

## 2018-07-25 NOTE — TELEPHONE ENCOUNTER
I spoke with patient's wife and was informed for the first time that her  (my patient) Milagro Line has to push and pull over 800lbs while at work and that this aggravates his pain and he cannot work at this time and needs to go on disability until treatment has been completed  Can you contact elissa [phone], claim number [id number],  [facility] regarding this so that I can make the necessary changes given  this new information  I also put in the system a referral to see Dr Moon Archer

## 2018-07-30 ENCOUNTER — ANESTHESIA EVENT (OUTPATIENT)
Dept: PERIOP | Facility: HOSPITAL | Age: 58
End: 2018-07-30
Payer: COMMERCIAL

## 2018-07-30 NOTE — ANESTHESIA PREPROCEDURE EVALUATION
Review of Systems/Medical History  Patient summary reviewed  Chart reviewed  No history of anesthetic complications     Cardiovascular  EKG reviewed, Exercise tolerance (METS): >4,  Hyperlipidemia, Hypertension ,   Comment: Normal sinus rhythm  Early repolarization  Confirmed by Rubio Kilpatrick MD, Kartik Duong ([id number]) on 3/12/2018 6:09:20 PM    Specimen Collected: 03/12/18 12:17  Last Resulted: 03/12/18 18:09      ,  Pulmonary       GI/Hepatic      Comment: Abdominal pain, generalized     Umbilical hernia     Kidney stones,        Endo/Other    Obesity    GYN       Hematology   Musculoskeletal       Neurology   Psychology     Chronic pain,            Physical Exam    Airway    Mallampati score: II  TM Distance: >3 FB  Neck ROM: full     Dental   No notable dental hx     Cardiovascular  Cardiovascular exam normal    Pulmonary  Pulmonary exam normal Breath sounds clear to auscultation,     Other Findings        Anesthesia Plan  ASA Score- 2     Anesthesia Type- IV sedation with anesthesia with ASA Monitors  Additional Monitors:   Airway Plan:         Plan Factors- Patient instructed to abstain from smoking on day of procedure       Induction- intravenous  Postoperative Plan- Plan for postoperative opioid use  Informed Consent- Anesthetic plan and risks discussed with patient  I personally reviewed this patient with the CRNA  Discussed and agreed on the Anesthesia Plan with the CRNA             Lab Results   Component Value Date    WBC 4 94 03/10/2018    HGB 15 0 03/10/2018    HCT 44 2 03/10/2018    MCV 88 03/10/2018     03/14/2018     Lab Results   Component Value Date    GLUCOSE 118 10/31/2017    CALCIUM 8 7 03/10/2018     03/10/2018    K 4 2 03/10/2018    CO2 29 03/10/2018     03/10/2018    BUN 13 03/10/2018    CREATININE 0 96 03/10/2018         I, Dr Bernardo Talbert, the attending physician, have personally seen and evaluated the patient prior to anesthetic care   I have reviewed the pre-anesthetic record, and other medical records if appropriate to the anesthetic care  If a CRNA is involved in the case, I have reviewed the CRNA assessment, if present, and agree  The patient is in a suitable condition to proceed with my formulated anesthetic plan

## 2018-07-31 ENCOUNTER — ANESTHESIA (OUTPATIENT)
Dept: PERIOP | Facility: HOSPITAL | Age: 58
End: 2018-07-31
Payer: COMMERCIAL

## 2018-07-31 ENCOUNTER — APPOINTMENT (OUTPATIENT)
Dept: RADIOLOGY | Facility: HOSPITAL | Age: 58
End: 2018-07-31
Payer: COMMERCIAL

## 2018-07-31 ENCOUNTER — HOSPITAL ENCOUNTER (OUTPATIENT)
Facility: HOSPITAL | Age: 58
Setting detail: OUTPATIENT SURGERY
Discharge: HOME | End: 2018-07-31
Attending: ANESTHESIOLOGY | Admitting: ANESTHESIOLOGY
Payer: COMMERCIAL

## 2018-07-31 VITALS
HEART RATE: 69 BPM | HEIGHT: 72 IN | RESPIRATION RATE: 15 BRPM | TEMPERATURE: 96.1 F | BODY MASS INDEX: 36.85 KG/M2 | WEIGHT: 272.05 LBS | OXYGEN SATURATION: 94 % | DIASTOLIC BLOOD PRESSURE: 85 MMHG | SYSTOLIC BLOOD PRESSURE: 148 MMHG

## 2018-07-31 PROCEDURE — 72070 X-RAY EXAM THORAC SPINE 2VWS: CPT

## 2018-07-31 PROCEDURE — 64530 N BLOCK INJ CELIAC PELUS: CPT | Performed by: ANESTHESIOLOGY

## 2018-07-31 RX ORDER — FENTANYL CITRATE/PF 50 MCG/ML
50 SYRINGE (ML) INJECTION
Status: DISCONTINUED | OUTPATIENT
Start: 2018-07-31 | End: 2018-07-31 | Stop reason: HOSPADM

## 2018-07-31 RX ORDER — SODIUM CHLORIDE, SODIUM LACTATE, POTASSIUM CHLORIDE, CALCIUM CHLORIDE 600; 310; 30; 20 MG/100ML; MG/100ML; MG/100ML; MG/100ML
75 INJECTION, SOLUTION INTRAVENOUS CONTINUOUS
Status: DISCONTINUED | OUTPATIENT
Start: 2018-07-31 | End: 2018-07-31 | Stop reason: HOSPADM

## 2018-07-31 RX ORDER — MIDAZOLAM HYDROCHLORIDE 1 MG/ML
INJECTION INTRAMUSCULAR; INTRAVENOUS AS NEEDED
Status: DISCONTINUED | OUTPATIENT
Start: 2018-07-31 | End: 2018-07-31 | Stop reason: SURG

## 2018-07-31 RX ORDER — METOCLOPRAMIDE HYDROCHLORIDE 5 MG/ML
10 INJECTION INTRAMUSCULAR; INTRAVENOUS ONCE AS NEEDED
Status: DISCONTINUED | OUTPATIENT
Start: 2018-07-31 | End: 2018-07-31 | Stop reason: HOSPADM

## 2018-07-31 RX ORDER — BUPIVACAINE HYDROCHLORIDE 2.5 MG/ML
INJECTION, SOLUTION INFILTRATION; PERINEURAL AS NEEDED
Status: DISCONTINUED | OUTPATIENT
Start: 2018-07-31 | End: 2018-07-31 | Stop reason: HOSPADM

## 2018-07-31 RX ORDER — SODIUM CHLORIDE, SODIUM LACTATE, POTASSIUM CHLORIDE, CALCIUM CHLORIDE 600; 310; 30; 20 MG/100ML; MG/100ML; MG/100ML; MG/100ML
50 INJECTION, SOLUTION INTRAVENOUS CONTINUOUS
Status: DISCONTINUED | OUTPATIENT
Start: 2018-07-31 | End: 2018-07-31 | Stop reason: HOSPADM

## 2018-07-31 RX ORDER — PROPOFOL 10 MG/ML
INJECTION, EMULSION INTRAVENOUS AS NEEDED
Status: DISCONTINUED | OUTPATIENT
Start: 2018-07-31 | End: 2018-07-31 | Stop reason: SURG

## 2018-07-31 RX ORDER — PROPOFOL 10 MG/ML
INJECTION, EMULSION INTRAVENOUS CONTINUOUS PRN
Status: DISCONTINUED | OUTPATIENT
Start: 2018-07-31 | End: 2018-07-31 | Stop reason: SURG

## 2018-07-31 RX ORDER — DEXAMETHASONE SODIUM PHOSPHATE 10 MG/ML
INJECTION, SOLUTION INTRAMUSCULAR; INTRAVENOUS AS NEEDED
Status: DISCONTINUED | OUTPATIENT
Start: 2018-07-31 | End: 2018-07-31 | Stop reason: HOSPADM

## 2018-07-31 RX ORDER — ONDANSETRON 2 MG/ML
4 INJECTION INTRAMUSCULAR; INTRAVENOUS ONCE AS NEEDED
Status: DISCONTINUED | OUTPATIENT
Start: 2018-07-31 | End: 2018-07-31 | Stop reason: HOSPADM

## 2018-07-31 RX ORDER — DEXMEDETOMIDINE HYDROCHLORIDE 100 UG/ML
INJECTION, SOLUTION INTRAVENOUS AS NEEDED
Status: DISCONTINUED | OUTPATIENT
Start: 2018-07-31 | End: 2018-07-31 | Stop reason: SURG

## 2018-07-31 RX ORDER — PROMETHAZINE HYDROCHLORIDE 25 MG/ML
12.5 INJECTION, SOLUTION INTRAMUSCULAR; INTRAVENOUS ONCE AS NEEDED
Status: DISCONTINUED | OUTPATIENT
Start: 2018-07-31 | End: 2018-07-31 | Stop reason: HOSPADM

## 2018-07-31 RX ORDER — ALBUTEROL SULFATE 2.5 MG/3ML
2.5 SOLUTION RESPIRATORY (INHALATION) ONCE AS NEEDED
Status: DISCONTINUED | OUTPATIENT
Start: 2018-07-31 | End: 2018-07-31 | Stop reason: HOSPADM

## 2018-07-31 RX ORDER — LIDOCAINE HYDROCHLORIDE 20 MG/ML
INJECTION, SOLUTION INFILTRATION; PERINEURAL AS NEEDED
Status: DISCONTINUED | OUTPATIENT
Start: 2018-07-31 | End: 2018-07-31 | Stop reason: HOSPADM

## 2018-07-31 RX ORDER — MAGNESIUM HYDROXIDE 1200 MG/15ML
LIQUID ORAL AS NEEDED
Status: DISCONTINUED | OUTPATIENT
Start: 2018-07-31 | End: 2018-07-31 | Stop reason: HOSPADM

## 2018-07-31 RX ADMIN — DEXMEDETOMIDINE 12 MCG: 100 INJECTION, SOLUTION, CONCENTRATE INTRAVENOUS at 08:23

## 2018-07-31 RX ADMIN — LIDOCAINE HYDROCHLORIDE 100 MG: 20 INJECTION, SOLUTION INTRAVENOUS at 08:23

## 2018-07-31 RX ADMIN — SODIUM CHLORIDE, SODIUM LACTATE, POTASSIUM CHLORIDE, AND CALCIUM CHLORIDE 75 ML/HR: .6; .31; .03; .02 INJECTION, SOLUTION INTRAVENOUS at 07:36

## 2018-07-31 RX ADMIN — PROPOFOL 70 MCG/KG/MIN: 10 INJECTION, EMULSION INTRAVENOUS at 08:25

## 2018-07-31 RX ADMIN — PROPOFOL 50 MG: 10 INJECTION, EMULSION INTRAVENOUS at 08:23

## 2018-07-31 RX ADMIN — MIDAZOLAM HYDROCHLORIDE 2 MG: 1 INJECTION, SOLUTION INTRAMUSCULAR; INTRAVENOUS at 08:16

## 2018-07-31 RX ADMIN — SODIUM CHLORIDE, SODIUM LACTATE, POTASSIUM CHLORIDE, AND CALCIUM CHLORIDE: .6; .31; .03; .02 INJECTION, SOLUTION INTRAVENOUS at 07:52

## 2018-07-31 NOTE — OP NOTE
OPERATIVE REPORT  PATIENT NAME: Jamel Vidal    :  1960  MRN: [id number]  Pt Location: [facility] 04    SURGERY DATE: 2018    Surgeon(s) and Role:     * Mari Louis MD - Primary    Preop Diagnosis:  Abdominal pain, generalized [R10 84]    Post-Op Diagnosis Codes:     * Abdominal pain, generalized [R10 84]    Procedure(s) (LRB):  SPLANCHNIC NERVE BLOCK (Bilateral)    Specimen(s):  * No specimens in log *    Estimated Blood Loss:   Minimal    Drains:   None    Anesthesia Type:   IV Sedation with Anesthesia    Operative Indications:  Abdominal pain, generalized [R10 84]    Operative Findings:  None     Complications:   None    Procedure and Technique:  MEDICATIONS INJECTED: 12 mL of bupivacaine 0 25% (6 mL per side) plus 20mg of dexamethasone (10mg per side) in 4cc of sterile saline  LOCAL ANESTHETIC USED: 5 mL of 2% lidocaine  ESTIMATED BLOOD LOSS: None  COMPLICATIONS: None      TECHNIQUE: Time-out was taken to identify the correct patient, procedure and side prior to starting the procedure  With the patient lying prone, the area to be injected was widely prepped and draped in the usual sterile fashion using DuraPrep and a drape  The anatomical target site was determined using fluoroscopy by squaring off the superior endplate of B90, ipsilaterally obliquing the C-arm intensifier until the tip of the T12 transverse process was at the anterolateral border of the T12 vertebral body, and then moving the C-arm intensifier caudal to move the 12th rib out of the fluoroscopic target view  Local anesthetic was given by raising a skin wheal and going down to the hub of the 27-gauge 1 25-inch needle  A 22-gauge 5-inch Quincke needle was advanced at the midbody of T12 to a point in the anterior one-third of the T12 vertebral body utilizing A-P and lateral intermittent fluoroscopy   3 mL of Omnipaque 300 contrast was injected to show unilateral spread along the anterolateral surface of the T12 vertebral body, and no vascular uptake       The above technique was then repeated for the opposite side with contrast injected again to show unilateral spread superior and inferior along the anterolateral T12 body  Medication was then injected slowly in 3 mL increments after negative aspirations of the needle to confirm the needle had not slipped intravascular  The procedure was completed without complications and was tolerated well  The patient was monitored after the procedure  The patient and his wife were given post-procedure and discharge instructions to follow at home  The patient was discharged in stable condition  The patient will call the office to make a follow up appointment in 6 weeks      I was present for the entire procedure    Patient Disposition:  PACU immediately post-op and discharge home with a reliable escort    I was present for the entire procedure    Patient Disposition:  PACU     SIGNATURE: Heather Barroso MD  DATE: July 31, 2018  TIME: 8:55 AM

## 2018-07-31 NOTE — INTERVAL H&P NOTE
H&P reviewed  After examining the patient I find no changes in the patients condition since the H&P had been written      Patient is here for a b/l splanchnic nerve block

## 2018-07-31 NOTE — H&P (VIEW-ONLY)
Assessment:  1  Chronic pain syndrome - Bilateral     2  Generalized abdominal pain - Bilateral  FL spine and pain procedure       Plan:  Patient is a 62 y o male who presents with Chronic abdominal pain following multiple abdominal surgeries for adhesions, umbilical hernia  The patient currently presents with ongoing dull achy pressure-like pain  He recently had a transversus abdominal plane block without much relief of pain  He is currently on gabapentin 400 mg p o  T i d       Today, we discussed other alternatives to help manage his symptoms  I discussed with patient regarding performing the bilateral splanchnic nerve block at T12 to help relieve his symptoms  I discussed with patient the procedure in detail including the risk and the benefits  Complete risks and benefits including bleeding, infection, tissue reaction, nerve injury and allergic reaction were discussed  The approach was demonstrated using models and literature was provided  Verbal and written consent was obtained  My impressions and treatment recommendations were discussed in detail with the patient who verbalized understanding and had no further questions  Discharge instructions were provided  I personally saw and examined the patient and I agree with the above discussed plan of care  History of Present Illness:  Joshua Cao is a 62 y o  male who presents for a follow up office visit in regards to Abdominal Pain  The patients current symptoms include bilateral abdominal lower quadrant abdominal pain  Patient had a TAP block 4 weeks ago  He states that he still has ongoing pain  He states that his pain is sharp in nature rated 9/10  He states that he is willing to try the injection again  He states that his pain is dull/achy and deep  He states that he takes gabapentin 400mg po TID       I have personally reviewed and/or updated the patient's past medical history, past surgical history, family history, social history, current medications, allergies, and vital signs today  Review of Systems   Respiratory: Negative for shortness of breath  Cardiovascular: Negative for chest pain  Gastrointestinal: Negative for constipation, diarrhea, nausea and vomiting  Musculoskeletal: Positive for gait problem  Negative for arthralgias, joint swelling and myalgias  Decreased ROM   Skin: Negative for rash  Neurological: Negative for dizziness, seizures and weakness  All other systems reviewed and are negative  Patient Active Problem List   Diagnosis    Recurrent umbilical hernia    Generalized abdominal pain    Post-op pain    Hyperlipidemia    Hypertension    Obesity    Abdominal wall pain    Chronic pain syndrome       Past Medical History:   Diagnosis Date    Hypertension     Hypertension     Kidney stone     Recurrent umbilical hernia with incarceration     resolved 12/11/2017       Past Surgical History:   Procedure Laterality Date    CYSTOSCOPY W/ LASER LITHOTRIPSY      HERNIA REPAIR      x2    HIP DEBRIDEMENT Right     incision and drainage of skin of abcess hip    LAPAROSCOPY N/A 3/14/2018    Procedure: DIAGNOSTIC LAPAROSCOPY; LYSIS OF ADHESIONS;  Surgeon: Chasity Mabry MD;  [facility]: [facility];  Service: General    [place] LAP, VENTRAL HERNIA REPAIR,REDUCIBLE N/A 12/6/2017    Procedure: LAPAROSCOPIC UMBILICAL HERNIA REPAIR WITH MESH;  Surgeon: Chasity Mabry MD;  [facility];  Service: General       Family History   Problem Relation Age of Onset    Hypertension Father     Cancer Father         throat        Social History     Occupational History    Not on file       Social History Main Topics    Smoking status: Current Every Day Smoker     Packs/day: 0 20     Types: Cigarettes    Smokeless tobacco: Never Used    Alcohol use Yes      Comment: social    Drug use: No    Sexual activity: Not on file       Current Outpatient Prescriptions on File Prior to Visit   Medication Sig    gabapentin (NEURONTIN) 100 mg capsule TAKE 2 CAPSULES 3 TIMES A DAY    gabapentin (NEURONTIN) 300 mg capsule Take 1 capsule (300 mg total) by mouth 3 (three) times a day    lisinopril (ZESTRIL) 20 mg tablet Take 1 tablet (20 mg total) by mouth daily     No current facility-administered medications on file prior to visit  Allergies   Allergen Reactions    Ceclor [Cefaclor] Hives       Physical Exam:    /78   Pulse 78   Resp 18   Ht 6' 1" (1 854 m)   Wt 126 kg (278 lb)   BMI 36 68 kg/m²     Constitutional:normal, well developed, well nourished, alert, in no distress and non-toxic and no overt pain behavior  Eyes:anicteric  HEENT:grossly intact  Neck:supple, symmetric, trachea midline and no masses   Pulmonary:even and unlabored  Cardiovascular:No edema or pitting edema present  Skin:Normal without rashes or lesions and well hydrated  Psychiatric:Mood and affect appropriate  Neurologic:Cranial Nerves II-XII grossly intact  Musculoskeletal:normal    Abdomen Tenderness at the right and left lower quadrant      Imaging

## 2018-07-31 NOTE — ANESTHESIA POSTPROCEDURE EVALUATION
Post-Op Assessment Note      CV Status:  Stable    Mental Status:  Alert and awake    Hydration Status:  Euvolemic    PONV Controlled:  Controlled    Airway Patency:  Patent    Post Op Vitals Reviewed: Yes          Staff: CRNA           BP  121/85   Temp      Pulse  71   Resp   18   SpO2   95%

## 2018-07-31 NOTE — PROCEDURES
Procedures      History of Present Illness: The patient is a 62 y o  male who presents with complaints of chronic abdominal pain  Patient is here for a bilateral splanchnic nerve block      Patient Active Problem List   Diagnosis    Recurrent umbilical hernia    Generalized abdominal pain    Post-op pain    Hyperlipidemia    Hypertension    Obesity    Abdominal wall pain    Chronic pain syndrome    Abdominal pain, generalized       Past Medical History:   Diagnosis Date    Hypertension     Hypertension     Kidney stone     Recurrent umbilical hernia with incarceration     resolved 12/11/2017       Past Surgical History:   Procedure Laterality Date    CYSTOSCOPY      HERNIA REPAIR      x2    HIP DEBRIDEMENT Right     incision and drainage of skin of abcess hip    LAPAROSCOPY N/A 3/14/2018    Procedure: DIAGNOSTIC LAPAROSCOPY; LYSIS OF ADHESIONS;  Surgeon: Shanon Jordan MD;  [facility];  Service: General    [place] LAP, [address] N/A 12/6/2017    Procedure: LAPAROSCOPIC UMBILICAL HERNIA REPAIR WITH MESH;  Surgeon: Shanon Jordan MD;  [facility];  Service: General         Current Facility-Administered Medications:     lactated ringers infusion, 75 mL/hr, Intravenous, Continuous, Viv Edward MD, Last Rate: 75 mL/hr at 07/31/18 0736, 75 mL/hr at 07/31/18 0736    Allergies   Allergen Reactions    Ceclor [Cefaclor] Hives       Physical Exam:   Vitals:    07/31/18 0718   BP: (!) 172/92   Pulse: 76   Resp: 21   Temp: 98 °F (36 7 °C)   SpO2: 95%     General: Awake, Alert, Oriented x 3, Mood and affect appropriate  Respiratory: Respirations even and unlabored  Cardiovascular: Peripheral pulses intact; no edema  Musculoskeletal Exam: wnl  Mid epigastric tenderness to deep palpation     ASA Score: 2    Patient/Chart Verification  Patient ID Verified: Verbal, Armband  ID Band Applied: Yes  Consents Confirmed: Anesthesia, Procedural  H&P( within 30 days) Verified: Yes  Interval H&P(within 24 hr) Complete (required for Outpatients and Surgery Admit only): Yes  Beta Blocker given : No  Pre-op Lab/Test Results Available: N/A  Does Patient Have a Prosthetic Device/Implant: No    Assessment: Chronic Abdominal Pain     Plan: Bilateral Splanchnic Nerve Block

## 2018-07-31 NOTE — DISCHARGE INSTRUCTIONS
SPLANCHNIC NERVE BLOCK (DISCHARGE CARE)      ACTIVITY  · Attend a therapy or home therapy as prescribed  · Please call our office afterwards with a progress report  · DO NOT DRIVE or operate machinery today  · You may resume normal activities tomorrow as tolerated  CARE OF THE INJECTION SITE  · For soreness or pain, apply ice to the area today (20 minute on/20 minutes off)  · [facility] if you have any of the following: redness, drainage, swelling or fever above 100°F     SPECIAL INSTRUCTIONS  · You may experience the following up for 6 hours after the procedure:  · Diarrhea  · Dizziness  Lying down usually helps  Sit up and then stand gradually, with assistance if needed  · Notify the [facility] or go to the nearest emergency room if you have severe back pain, bloody urine or inability to pass urine  Be sure to let a medical professional know that you have had a lumbar sympathetic nerve block  · Call the number below to make a follow up appointment in 6 weeks    MEDICATIONS  · Continue to take all routine medications  · Our office may have instructed you to hold some medications  · You may resume medications as prescribed by your PCP  If you have any problems specifically related to your procedure, please call our office at [phone]  Problems not related to your procedure should be directed at your primary care physician

## 2018-08-03 ENCOUNTER — TELEPHONE (OUTPATIENT)
Dept: INTERNAL MEDICINE CLINIC | Facility: CLINIC | Age: 58
End: 2018-08-03

## 2018-08-03 NOTE — TELEPHONE ENCOUNTER
Patient has paper work that needs to be filled out by Monday or he will lose his chance of coming back to work  I put sticky notes on the questions that need to be done, Can this be done by Monday  I sent to [employer] because he has been the one doing the paper work on this patient  Can wait till Monday

## 2018-08-09 ENCOUNTER — OFFICE VISIT (OUTPATIENT)
Dept: INTERNAL MEDICINE CLINIC | Facility: CLINIC | Age: 58
End: 2018-08-09
Payer: COMMERCIAL

## 2018-08-09 VITALS
WEIGHT: 278 LBS | HEIGHT: 73 IN | DIASTOLIC BLOOD PRESSURE: 88 MMHG | HEART RATE: 79 BPM | TEMPERATURE: 98.2 F | OXYGEN SATURATION: 96 % | RESPIRATION RATE: 18 BRPM | SYSTOLIC BLOOD PRESSURE: 154 MMHG | BODY MASS INDEX: 36.84 KG/M2

## 2018-08-09 DIAGNOSIS — G89.18 POST-OP PAIN: Primary | ICD-10-CM

## 2018-08-09 DIAGNOSIS — R19.7 DIARRHEA, UNSPECIFIED TYPE: ICD-10-CM

## 2018-08-09 DIAGNOSIS — R10.84 ABDOMINAL PAIN, GENERALIZED: ICD-10-CM

## 2018-08-09 DIAGNOSIS — R35.89 POLYURIA: ICD-10-CM

## 2018-08-09 DIAGNOSIS — R10.9 ABDOMINAL WALL PAIN: ICD-10-CM

## 2018-08-09 DIAGNOSIS — G89.4 CHRONIC PAIN SYNDROME: ICD-10-CM

## 2018-08-09 PROCEDURE — 99213 OFFICE O/P EST LOW 20 MIN: CPT | Performed by: PHYSICIAN ASSISTANT

## 2018-08-09 NOTE — PATIENT INSTRUCTIONS
Current plan will be follow-up tomorrow as scheduled with Dr Mora Ritter tomorrow  Discussed with patient and his wife about possible  Functional capacity exam  Will have labs checked due to symptoms of polyuria and diarrhea after last pain management injections  Schedule follow-up here again in 1 month  Will monitor blood pressure as today it is elevated

## 2018-08-09 NOTE — PROGRESS NOTES
Assessment/Plan:      Diagnoses and all orders for this visit:    Abdominal wall pain          Discussion:  I will have the patient go back to Pain Medicine for possible trigger point injections in his rectus abdominus which have thus far not been injected  I have little more offer him from physiatric perspective  I also explained to him and his wife at I am unable to comment on his functional status or returning to work restrictions   As I have no experience or training   In post hernia repair states  I consulted with the AMA guidelines to permanent impairment  And pain complaints after a successful hernia repair do not appear to be ratable  Subjective:     Patient ID: Amber Wilson is a 62 y o  male  HPI    Referral from pain medicine, patient just had bilateral splanchnic  neck nerve blocks July 31st  No significant change  He reports a history of umbilical herniorrhaphy in 2010 subsequent to that he said he "felt the same" but did not have pain did have to see a surgeon  He began to developed periumbilical pain in 7655 was seen by degree her see a and found to have a recurrent hernia  He had a redo laparoscopic surgery which is described to be complicated  He continues to have periumbilical pain he had a 3rd surgery with laparoscopic lysis of adhesions and I believe removal of some anchors without change in his pain  He had a transverse abdominus injection lateral approach without benefit  His pain is periumbilical aggravated by eating his most comfortable position is seated  He  Has been on STD Dec 6, 2017 (OR day) and ran out June 4th, 2018  Had applied for LTD, denied due to release to work by operating surgeon  Had second opinion from [facility] surgeon  Is currently appealing LTD  Currently pain is Pain pattern is left superior to the umbilicus radiating across the midline to the right associated with a spot even with the umbilicus on the left side  , made worse by eating, disturbs his sleep, least pain is sitting  No radiation to legs, no LE sensory disturbance  He works in [employer]  Review of Systems   Gastrointestinal: Positive for abdominal pain  Genitourinary: Negative for difficulty urinating  Musculoskeletal: Negative for arthralgias and back pain  Neurological: Positive for numbness  Objective:  CTAP  ABDOMINAL WALL/INGUINAL REGIONS:  Umbilical hernia repair with intact mesh   No recurrent hernia      OSSEOUS STRUCTURES:  No acute fracture or destructive osseous lesion      IMPRESSION:     Umbilical hernia repair with intact mesh   No recurrent hernia      Stable bilateral nonobstructing intrarenal calculi and complex left renal cysts    records:     Physical Exam   Constitutional: He appears well-developed and well-nourished  No distress  Abdominal: There is tenderness (  Diffuse periumbilicalWithout rebound) in the periumbilical area  Musculoskeletal:        Arms:   Cee Edouard test negative, acceptable extension of  The hips  Some palpatory tenderness over previous injection site in the lower lumbar paravertebral muscles  There are no root tension signs  Tenderness appears to be greatest over the rectus abdominis and not laterally  Neurological:   Reflex Scores:       Tricep reflexes are 2+ on the right side and 2+ on the left side  Bicep reflexes are 2+ on the left side  Brachioradialis reflexes are 2+ on the right side and 2+ on the left side  Patellar reflexes are 2+ on the right side and 2+ on the left side  Achilles reflexes are 2+ on the right side and 2+ on the left side  Able to heel-walk and toe-walk without focal or lateralizing motor weakness

## 2018-08-09 NOTE — PROGRESS NOTES
Assessment/Plan:   Patient Instructions   Current plan will be follow-up tomorrow as scheduled with Dr Luis Mcpherson tomorrow  Discussed with patient and his wife about possible  Functional capacity exam  Will have labs checked due to symptoms of polyuria and diarrhea after last pain management injections  Schedule follow-up here again in 1 month  Will monitor blood pressure as today it is elevated  Return in about 4 weeks (around 9/6/2018) for Next scheduled follow up  Diagnoses and all orders for this visit:    Post-op pain    Abdominal wall pain    Abdominal pain, generalized    Chronic pain syndrome    Polyuria  -     Comprehensive metabolic panel; Future    Diarrhea, unspecified type  -     CBC and differential; Future  -     Comprehensive metabolic panel; Future          Subjective:      Patient ID: Cassidy Villalobos is a 62 y o  male  Follow-up    Patient is seen pain management and has undergone 2 different pain management procedures  Neither have provided significant relief of his abdominal pain that has been present since his surgery  His last pain management injection precipitated polyuria and diarrhea  We will therefore send him for labs to check his blood sugar  No polyphagia or polydipsia  Increasing dose of gabapentin has not provided any relief  Continues to have pain on any tightening of the abdominal muscles or any activity that requires use of abdominal muscles such as pushing, pulling, lifting and even stretching  He still cannot stand even riding in the car over a bumpy road such as [address] coming from his house [place] down to the office  No nausea, vomiting  No blood in stools or melena  Long discussion held with patient and his wife who is present about what next to do  They have informed me that they have an appointment tomorrow for what they believe is disability determination    I had discussed with them about sending patient for a functional capacity exam   They were not in disagreement to do so, however I would have thought that there insurance company would have requested this by now  Hopefully tomorrow as exam and evaluation will help us  We also discussed some symptoms of frustration and depression that he is having  I did discuss possibly tapering him off gabapentin since it really is not helping and possibly starting something like duloxetine to see if that may be would help mood and even some of his pain          ALLERGIES:  Allergies   Allergen Reactions    Ceclor [Cefaclor] Hives       CURRENT MEDICATIONS:    Current Outpatient Prescriptions:     gabapentin (NEURONTIN) 100 mg capsule, TAKE 2 CAPSULES 3 TIMES A DAY, Disp: 180 capsule, Rfl: 1    gabapentin (NEURONTIN) 300 mg capsule, Take 1 capsule (300 mg total) by mouth 3 (three) times a day, Disp: 270 capsule, Rfl: 1    lisinopril (ZESTRIL) 20 mg tablet, Take 1 tablet (20 mg total) by mouth daily, Disp: 90 tablet, Rfl: 1    ACTIVE PROBLEM LIST:  Patient Active Problem List   Diagnosis    Recurrent umbilical hernia    Generalized abdominal pain    Post-op pain    Hyperlipidemia    Hypertension    Obesity    Abdominal wall pain    Chronic pain syndrome    Abdominal pain, generalized    Polyuria    Diarrhea       PAST MEDICAL HISTORY:  Past Medical History:   Diagnosis Date    Hypertension     Hypertension     Kidney stone     Recurrent umbilical hernia with incarceration     resolved 12/11/2017       PAST SURGICAL HISTORY:  Past Surgical History:   Procedure Laterality Date    CYSTOSCOPY      HERNIA REPAIR      x2    HIP DEBRIDEMENT Right     incision and drainage of skin of abcess hip    LAPAROSCOPY N/A 3/14/2018    Procedure: DIAGNOSTIC LAPAROSCOPY; LYSIS OF ADHESIONS;  Surgeon: Francia Duncan MD;  [facility];  Service: General    NERVE BLOCK Bilateral 7/31/2018    Procedure: SPLANCHNIC NERVE BLOCK;  Surgeon: Nancie Swain MD;  [facility];  Service: Pain Management  [place] LAP, VENTRAL HERNIA REPAIR,REDUCIBLE N/A 12/6/2017    Procedure: LAPAROSCOPIC UMBILICAL HERNIA REPAIR WITH MESH;  Surgeon: Ronit Arcos MD;  [facility];  Service: General       FAMILY HISTORY:  Family History   Problem Relation Age of Onset    Hypertension Father     Cancer Father         throat        SOCIAL HISTORY:  Social History     Social History    Marital status: /Civil Union     Spouse name: N/A    Number of children: N/A    Years of education: N/A     Occupational History    Not on file  Social History Main Topics    Smoking status: Current Every Day Smoker     Packs/day: 0 20     Years: 30 00     Types: Cigarettes    Smokeless tobacco: Never Used    Alcohol use Yes      Comment: rarely    Drug use: No    Sexual activity: Yes     Partners: Female     Other Topics Concern    Not on file     Social History Narrative    No narrative on file       Review of Systems   Constitutional: Negative for activity change, chills, fatigue and fever  HENT: Negative for congestion  Eyes: Negative for discharge  Respiratory: Negative for cough, chest tightness and shortness of breath  Cardiovascular: Negative for chest pain, palpitations and leg swelling  Gastrointestinal: Positive for abdominal pain and diarrhea  Endocrine: Positive for polyuria  Genitourinary: Negative for difficulty urinating  Musculoskeletal: Negative for arthralgias and myalgias  Skin: Negative for rash  Allergic/Immunologic: Negative for immunocompromised state  Neurological: Negative for dizziness, syncope, weakness, light-headedness and headaches  Hematological: Negative for adenopathy  Does not bruise/bleed easily  Psychiatric/Behavioral: Negative for dysphoric mood  The patient is not nervous/anxious            Objective:  Vitals:    08/09/18 1539   BP: 154/88   BP Location: Left arm   Patient Position: Sitting   Cuff Size: Adult   Pulse: 79   Resp: 18   Temp: 98 2 °F (36 8 °C)   TempSrc: Temporal   SpO2: 96%   Weight: 126 kg (278 lb)   Height: 6' 1" (1 854 m)        Physical Exam   Constitutional: He is oriented to person, place, and time  He appears well-developed and well-nourished  No distress  Obese   Neck: Neck supple  Cardiovascular: Normal rate, regular rhythm and normal heart sounds  Pulmonary/Chest: Effort normal and breath sounds normal    Abdominal: Soft  Bowel sounds are normal  There is tenderness (Continues with tenderness mid rectus area that spreads out laterally, tenderness on any tightening of the abdominal muscles or deep palpation)  Patient is comfortable sitting quietly without movement   Musculoskeletal: He exhibits no edema  Lymphadenopathy:     He has no cervical adenopathy  Neurological: He is alert and oriented to person, place, and time  Skin: Skin is warm and dry  No rash noted  Psychiatric: He has a normal mood and affect  His behavior is normal    Nursing note and vitals reviewed  RESULTS:    No results found for this or any previous visit (from the past 1008 hour(s))  This note was created with voice recognition software  Phonic, grammatical and spelling errors may be present within the note as a result

## 2018-08-10 ENCOUNTER — OFFICE VISIT (OUTPATIENT)
Dept: PAIN MEDICINE | Facility: CLINIC | Age: 58
End: 2018-08-10
Payer: COMMERCIAL

## 2018-08-10 VITALS
SYSTOLIC BLOOD PRESSURE: 148 MMHG | WEIGHT: 283 LBS | HEART RATE: 80 BPM | DIASTOLIC BLOOD PRESSURE: 90 MMHG | BODY MASS INDEX: 37.51 KG/M2 | HEIGHT: 73 IN

## 2018-08-10 DIAGNOSIS — R10.9 ABDOMINAL WALL PAIN: Primary | ICD-10-CM

## 2018-08-10 DIAGNOSIS — G89.4 CHRONIC PAIN SYNDROME: ICD-10-CM

## 2018-08-10 DIAGNOSIS — S39.011S: ICD-10-CM

## 2018-08-10 PROBLEM — S39.011A STRAIN OF RECTUS ABDOMINIS MUSCLE: Status: ACTIVE | Noted: 2018-08-10

## 2018-08-10 PROCEDURE — 99203 OFFICE O/P NEW LOW 30 MIN: CPT | Performed by: PHYSICAL MEDICINE & REHABILITATION

## 2018-08-10 NOTE — LETTER
August 15, 2018     Jocelin Stark MD  [address] [place] [address]    Patient: Alma Jules   YOB: 1960   Date of Visit: 8/10/2018       Dear Dr Darlin Tanner:    Thank you for referring Andrew August to me for evaluation  Below are the relevant portions of my assessment and plan of care  Diagnoses and all orders for this visit:    Abdominal wall pain        If you have questions, please do not hesitate to call me  I look forward to following Scot along with you           Sincerely,        Kristin Davis,         CC: Bethel Cabrera MD

## 2018-08-10 NOTE — LETTER
August 15, 2018     Fifi Owens MD  [address] [place] [id number]    Patient: Severa Cone   YOB: 1960   Date of Visit: 8/10/2018       Dear Dr Libby Guevara:    Thank you for referring Celina West to me for evaluation  Below are the relevant portions of my assessment and plan of care  Diagnoses and all orders for this visit:    Abdominal wall pain        If you have questions, please do not hesitate to call me  I look forward to following Scot along with you           Sincerely,        Keegan Ramon,         CC: No Recipients

## 2018-08-16 ENCOUNTER — TELEPHONE (OUTPATIENT)
Dept: OBGYN CLINIC | Facility: HOSPITAL | Age: 58
End: 2018-08-16

## 2018-08-16 NOTE — TELEPHONE ENCOUNTER
Caller: Batool Johnson, wife  Call back number: [phone]    Patient was seen by Dr Mora Ritter who stated he could do nothing for the patient  He did put a note in recommending the patient has a different injection  The note is in the system  They are wondering if they can schedule this   Please advise

## 2018-08-16 NOTE — TELEPHONE ENCOUNTER
Please review Dr Rachel De La Rosa office note for recommendations  Pt would like to schedule a procedure  Advised that SI out of office until Tuesday and will cb with recommendations

## 2018-08-22 NOTE — TELEPHONE ENCOUNTER
S/w pt's wife to schedule TAP Block - 9/26/18 @ 3 pm to arrive 2:45pm  Pt's wife requesting info sent to [employer] that pt can't return to work until after procedure

## 2018-08-30 NOTE — TELEPHONE ENCOUNTER
Please draft note stating  that patient is under my care and that he cannot return to work until after the procedure is completed on 9/26/18  I will sign off on it  Thank you

## 2018-09-17 ENCOUNTER — TELEPHONE (OUTPATIENT)
Dept: INTERNAL MEDICINE CLINIC | Facility: CLINIC | Age: 58
End: 2018-09-17

## 2018-09-17 NOTE — TELEPHONE ENCOUNTER
Spoke to pt's wife she said that she canceled both apts the other day threw televox I explained that you needed more than a day to fill out the form, and he shouldn't have missed his apt  She said that he needs the paper filled out or he will lose his job  I apologized, and told her we would get to it as soon as possible

## 2018-09-17 NOTE — TELEPHONE ENCOUNTER
Scot needs paperwork-[employer] [place] form-on top says accomodation with the date changed till after shot  Needs to be faxed today or patient will loose his job  Patient is getting his shot from [facility] 9/26

## 2018-09-26 ENCOUNTER — PROCEDURE VISIT (OUTPATIENT)
Dept: PAIN MEDICINE | Facility: CLINIC | Age: 58
End: 2018-09-26
Payer: COMMERCIAL

## 2018-09-26 VITALS
SYSTOLIC BLOOD PRESSURE: 142 MMHG | WEIGHT: 283 LBS | RESPIRATION RATE: 18 BRPM | HEART RATE: 94 BPM | DIASTOLIC BLOOD PRESSURE: 90 MMHG | HEIGHT: 73 IN | BODY MASS INDEX: 37.51 KG/M2

## 2018-09-26 DIAGNOSIS — R10.9 CHRONIC ABDOMINAL PAIN: Primary | ICD-10-CM

## 2018-09-26 DIAGNOSIS — G89.29 CHRONIC ABDOMINAL PAIN: Primary | ICD-10-CM

## 2018-09-26 PROCEDURE — 64488 TAP BLOCK BI INJECTION: CPT | Performed by: ANESTHESIOLOGY

## 2018-09-26 PROCEDURE — 76942 ECHO GUIDE FOR BIOPSY: CPT | Performed by: ANESTHESIOLOGY

## 2018-09-26 RX ORDER — LIDOCAINE HYDROCHLORIDE 20 MG/ML
5 INJECTION, SOLUTION INFILTRATION; PERINEURAL ONCE
Status: COMPLETED | OUTPATIENT
Start: 2018-09-26 | End: 2018-09-26

## 2018-09-26 RX ORDER — BUPIVACAINE HYDROCHLORIDE 2.5 MG/ML
10 INJECTION, SOLUTION INFILTRATION; PERINEURAL ONCE
Status: COMPLETED | OUTPATIENT
Start: 2018-09-26 | End: 2018-09-26

## 2018-09-26 RX ADMIN — LIDOCAINE HYDROCHLORIDE 5 ML: 20 INJECTION, SOLUTION INFILTRATION; PERINEURAL at 16:20

## 2018-09-26 RX ADMIN — Medication 10 MG: at 15:36

## 2018-09-26 RX ADMIN — BUPIVACAINE HYDROCHLORIDE 10 ML: 2.5 INJECTION, SOLUTION INFILTRATION; PERINEURAL at 15:33

## 2018-09-26 NOTE — PROGRESS NOTES
History of Present Illness: The patient is a 62 y o  male who presents with complaints of abdominal pain  Patient is here for a TAP Block      Patient Active Problem List   Diagnosis    Recurrent umbilical hernia    Generalized abdominal pain    Post-op pain    Hyperlipidemia    Hypertension    Obesity    Abdominal wall pain    Chronic pain syndrome    Abdominal pain, generalized    Polyuria    Diarrhea    Strain of rectus abdominis muscle       Past Medical History:   Diagnosis Date    Hypertension     Hypertension     Kidney stone     Recurrent umbilical hernia with incarceration     resolved 12/11/2017       Past Surgical History:   Procedure Laterality Date    CYSTOSCOPY      HERNIA REPAIR      x2    HIP DEBRIDEMENT Right     incision and drainage of skin of abcess hip    LAPAROSCOPY N/A 3/14/2018    Procedure: DIAGNOSTIC LAPAROSCOPY; LYSIS OF ADHESIONS;  Surgeon: Ronit Arcos MD;  [facility];  Service: General    NERVE BLOCK Bilateral 7/31/2018    Procedure: SPLANCHNIC NERVE BLOCK;  Surgeon: Vannesa Esparza MD;  [facility];  Service: Pain Management     [place] LAP, VENTRAL HERNIA REPAIR,REDUCIBLE N/A 12/6/2017    Procedure: LAPAROSCOPIC UMBILICAL HERNIA REPAIR WITH MESH;  Surgeon: Ronit Arcos MD;  [facility];  Service: General         Current Outpatient Prescriptions:     gabapentin (NEURONTIN) 100 mg capsule, TAKE 2 CAPSULES 3 TIMES A DAY, Disp: 180 capsule, Rfl: 1    gabapentin (NEURONTIN) 300 mg capsule, Take 1 capsule (300 mg total) by mouth 3 (three) times a day, Disp: 270 capsule, Rfl: 1    lisinopril (ZESTRIL) 20 mg tablet, Take 1 tablet (20 mg total) by mouth daily, Disp: 90 tablet, Rfl: 1    Allergies   Allergen Reactions    Ceclor [Cefaclor] Hives       Physical Exam:   Vitals:    09/26/18 1441   BP: 142/90   Pulse: 94   Resp: 18     General: Awake, Alert, Oriented x 3, Mood and affect appropriate  Respiratory: Respirations even and unlabored  Cardiovascular: Peripheral pulses intact; no edema  Musculoskeletal Exam: wnl  Bilateral Abdominal tenderness at mid and lower quadrants bilaterally    ASA Score: 2      Nerve block  Date/Time: 9/26/2018 3:24 PM  Performed by: Leeanne Menchaca  Authorized by: Leeanne Menchaca     Consent:     Consent obtained:  Verbal and written    Consent given by:  Patient    Risks discussed: Allergic reaction, infection, nerve damage, bleeding, intravenous injection, pain and unsuccessful block    Alternatives discussed:  Alternative treatment and delayed treatment  Universal protocol:     Procedure explained and questions answered to patient or proxy's satisfaction: yes      Relevant documents present and verified: yes      Test results available and properly labeled: no     Radiology Images displayed and confirmed  If images not available, report reviewed: no      Required blood products, implants, devices, and special equipment available: no      Site marked: yes      Time out called: yes      Patient identity confirmed:  Verbally with patient and provided demographic data  Indications:     Indications:  Pain relief  Location:     Laterality:  Bilateral  Pre-procedure details:     Skin preparation:  2% chlorhexidine    Preparation: Patient was prepped and draped in usual sterile fashion    Skin anesthesia (see MAR for exact dosages):     Skin anesthesia method:  Local infiltration    Local anesthetic:  Lidocaine 2% w/o epi  Procedure details (see MAR for exact dosages): Block needle gauge:  20 G    Guidance: ultrasound      Anesthetic injected:  Bupivacaine 0 25% w/o epi    Steroid injected:  Dexamethasone    Injection procedure:  Anatomic landmarks identified, incremental injection, negative aspiration for blood, anatomic landmarks palpated and introduced needle  Post-procedure details:     Dressing:  Sterile dressing    Outcome:  Pain improved    Patient tolerance of procedure:   Tolerated well, no immediate complications  Comments:        Pre-procedure Diagnosis: Chronic Abdominal Pain   Post-procedure Diagnosis: Chronic Abdominal Pain   Procedure Title(s):  1  Bilateral US guided Transversus Abdominal Plane Block       2  Ultrasound Guided   Attending Surgeon:   Yesenia Alarcon MD  Anesthesia:   Local    Procedure Note:  The patients history and physical exam were reviewed  I explained the details of the procedure to the patient including the risks, benefits and other alternatives  We had an informed discussion and the patient verbalized understanding and signed the consent form  All questions were answered appropriately  The ultrasound probe is placed in a transverse plane to the lateral abdominal wall in the midaxillary line, between the lower costal margin and iliac crest  The use of ultrasound allows for accurate deposition of the local anaesthetic in the correct neurovascular plane - between the internal oblique muscle and the transversus abdominus muscle  Requirements: Ultrasound machine with a high frequency probe (10 MHz); Needle: 50mm needle; 10 ml syringe; 10 ml local anaesthetic 2% Lidocaine; 0 25% bupivacaine; dexamethasone 10mg 1cc  Procedure: The desired area of target (between the coastal margin and the iliac crest) was sterilely prepped and draped  3cc of 2% lidocaine was used to anesthesized the region  The stimuplex needle was then introduced in plane of the ultrasound probe directly under the probe and advanced until it reaches the plane between the internal oblique and transversus abdominis muscles  Upon reaching the plane, 2 ml of 2% lidocaine was injected to confirm correct needle position after which total of 8 ml of local anaesthetic solution (2% lidocaine and 0 25% bupivacaine) with 10mg dexamethasone was slowly injected  The transversus abdominis plane was visualized expanding with the injection (this appeared as a hypoechoic space)       The procedure was performed on the alternate side in the same manner   Disposition:  The patient reported immediate pain relief  There was no complications  He was discharged home to follow up in 4 weeks  Assessment: No diagnosis found      Plan:

## 2018-10-10 ENCOUNTER — APPOINTMENT (OUTPATIENT)
Dept: LAB | Facility: HOSPITAL | Age: 58
End: 2018-10-10
Payer: COMMERCIAL

## 2018-10-10 ENCOUNTER — OFFICE VISIT (OUTPATIENT)
Dept: INTERNAL MEDICINE CLINIC | Facility: CLINIC | Age: 58
End: 2018-10-10
Payer: COMMERCIAL

## 2018-10-10 VITALS
DIASTOLIC BLOOD PRESSURE: 90 MMHG | HEIGHT: 73 IN | OXYGEN SATURATION: 93 % | SYSTOLIC BLOOD PRESSURE: 140 MMHG | HEART RATE: 104 BPM | WEIGHT: 279 LBS | RESPIRATION RATE: 18 BRPM | TEMPERATURE: 99.5 F | BODY MASS INDEX: 36.98 KG/M2

## 2018-10-10 DIAGNOSIS — I10 ESSENTIAL HYPERTENSION: ICD-10-CM

## 2018-10-10 DIAGNOSIS — J01.40 ACUTE NON-RECURRENT PANSINUSITIS: ICD-10-CM

## 2018-10-10 DIAGNOSIS — J20.9 ACUTE BRONCHITIS WITH BRONCHOSPASM: ICD-10-CM

## 2018-10-10 DIAGNOSIS — R10.84 GENERALIZED ABDOMINAL PAIN: Primary | ICD-10-CM

## 2018-10-10 DIAGNOSIS — S39.011D STRAIN OF RECTUS ABDOMINIS MUSCLE, SUBSEQUENT ENCOUNTER: ICD-10-CM

## 2018-10-10 DIAGNOSIS — G89.18 POST-OP PAIN: ICD-10-CM

## 2018-10-10 DIAGNOSIS — G89.4 CHRONIC PAIN SYNDROME: ICD-10-CM

## 2018-10-10 DIAGNOSIS — R19.7 DIARRHEA, UNSPECIFIED TYPE: ICD-10-CM

## 2018-10-10 DIAGNOSIS — R10.9 ABDOMINAL WALL PAIN: ICD-10-CM

## 2018-10-10 DIAGNOSIS — R35.89 POLYURIA: ICD-10-CM

## 2018-10-10 LAB
ALBUMIN SERPL BCP-MCNC: 3.5 G/DL (ref 3.5–5)
ALP SERPL-CCNC: 82 U/L (ref 46–116)
ALT SERPL W P-5'-P-CCNC: 23 U/L (ref 12–78)
ANION GAP SERPL CALCULATED.3IONS-SCNC: 7 MMOL/L (ref 4–13)
AST SERPL W P-5'-P-CCNC: 11 U/L (ref 5–45)
BASOPHILS # BLD AUTO: 0.08 THOUSANDS/ΜL (ref 0–0.1)
BASOPHILS NFR BLD AUTO: 1 % (ref 0–1)
BILIRUB SERPL-MCNC: 0.9 MG/DL (ref 0.2–1)
BUN SERPL-MCNC: 13 MG/DL (ref 5–25)
CALCIUM SERPL-MCNC: 9 MG/DL (ref 8.3–10.1)
CHLORIDE SERPL-SCNC: 99 MMOL/L (ref 100–108)
CO2 SERPL-SCNC: 29 MMOL/L (ref 21–32)
CREAT SERPL-MCNC: 1.14 MG/DL (ref 0.6–1.3)
EOSINOPHIL # BLD AUTO: 0.43 THOUSAND/ΜL (ref 0–0.61)
EOSINOPHIL NFR BLD AUTO: 4 % (ref 0–6)
ERYTHROCYTE [DISTWIDTH] IN BLOOD BY AUTOMATED COUNT: 12.6 % (ref 11.6–15.1)
GFR SERPL CREATININE-BSD FRML MDRD: 71 ML/MIN/1.73SQ M
GLUCOSE P FAST SERPL-MCNC: 135 MG/DL (ref 65–99)
HCT VFR BLD AUTO: 46.2 % (ref 36.5–49.3)
HGB BLD-MCNC: 16.2 G/DL (ref 12–17)
IMM GRANULOCYTES # BLD AUTO: 0.06 THOUSAND/UL (ref 0–0.2)
IMM GRANULOCYTES NFR BLD AUTO: 1 % (ref 0–2)
LYMPHOCYTES # BLD AUTO: 1.09 THOUSANDS/ΜL (ref 0.6–4.47)
LYMPHOCYTES NFR BLD AUTO: 10 % (ref 14–44)
MCH RBC QN AUTO: 30.7 PG (ref 26.8–34.3)
MCHC RBC AUTO-ENTMCNC: 35.1 G/DL (ref 31.4–37.4)
MCV RBC AUTO: 88 FL (ref 82–98)
MONOCYTES # BLD AUTO: 0.8 THOUSAND/ΜL (ref 0.17–1.22)
MONOCYTES NFR BLD AUTO: 7 % (ref 4–12)
NEUTROPHILS # BLD AUTO: 8.5 THOUSANDS/ΜL (ref 1.85–7.62)
NEUTS SEG NFR BLD AUTO: 77 % (ref 43–75)
NRBC BLD AUTO-RTO: 0 /100 WBCS
PLATELET # BLD AUTO: 204 THOUSANDS/UL (ref 149–390)
PMV BLD AUTO: 9 FL (ref 8.9–12.7)
POTASSIUM SERPL-SCNC: 3.3 MMOL/L (ref 3.5–5.3)
PROT SERPL-MCNC: 7.8 G/DL (ref 6.4–8.2)
RBC # BLD AUTO: 5.27 MILLION/UL (ref 3.88–5.62)
SODIUM SERPL-SCNC: 135 MMOL/L (ref 136–145)
WBC # BLD AUTO: 10.96 THOUSAND/UL (ref 4.31–10.16)

## 2018-10-10 PROCEDURE — 99214 OFFICE O/P EST MOD 30 MIN: CPT | Performed by: PHYSICIAN ASSISTANT

## 2018-10-10 PROCEDURE — 80053 COMPREHEN METABOLIC PANEL: CPT

## 2018-10-10 PROCEDURE — 85025 COMPLETE CBC W/AUTO DIFF WBC: CPT

## 2018-10-10 PROCEDURE — 36415 COLL VENOUS BLD VENIPUNCTURE: CPT

## 2018-10-10 RX ORDER — AZITHROMYCIN 250 MG/1
TABLET, FILM COATED ORAL
Qty: 6 TABLET | Refills: 0 | Status: SHIPPED | OUTPATIENT
Start: 2018-10-10 | End: 2018-10-15

## 2018-10-10 NOTE — PATIENT INSTRUCTIONS
Rest, increase fluids, Tylenol for fever or aches as per package instructions  Stop using OTC DayQuil/NyQuil  Start finished antibiotic  Continue follow-up with pain management and general surgery  Return 1 month for BP check

## 2018-10-10 NOTE — PROGRESS NOTES
Assessment/Plan:   Patient Instructions   Rest, increase fluids, Tylenol for fever or aches as per package instructions  Stop using OTC DayQuil/NyQuil  Start finished antibiotic  Continue follow-up with pain management and general surgery  Return 1 month for BP check  Return in about 4 weeks (around 11/7/2018) for Recheck  Diagnoses and all orders for this visit:    Generalized abdominal pain    Post-op pain    Chronic pain syndrome    Abdominal wall pain    Strain of rectus abdominis muscle, subsequent encounter    Acute bronchitis with bronchospasm  -     azithromycin (ZITHROMAX) 250 mg tablet; Take 2 tablets today then 1 tablet daily x 4 days    Acute non-recurrent pansinusitis  -     azithromycin (ZITHROMAX) 250 mg tablet; Take 2 tablets today then 1 tablet daily x 4 days    Essential hypertension          Subjective:      Patient ID: Kj Figueroa is a 62 y o  male  Follow-up    Acute concern for head congestion, postnasal drip, runny nose, low-grade fever, sneezing, and a cough that is productive of he states yellow sputum  He has tried over-the-counter NyQuil/DayQuil without any relief  Symptoms have been going on for over 10 days and not improving  Chronic abdominal wall pain which has been present postop since his hernia surgeries  He has been seen by pain management, 2nd surgical opinion, and Physical Medicine  He has had several pain management injections, been tried on several medications, all of which he and his wife was present are stating have not been helpful  He continues with the abdominal pain that he states is worse on any movement or tensing of abdominal muscles  It hurts him for going from supine to sitting position, sitting to standing position or even if sitting in a car driving over a bumpy road  He states his bowels are functioning and he is not having any difficulty urinating    We have suggested to his insurance company that he have an I independent medical examination with functional capacity exam, however they have not done anything about this  Patient is currently looking for another position at the company he works, that would not require any abdominal intensive activities  Hypertension:  Blood pressure elevated this morning  He has however been taking OTC medications and he is not sure he took his antihypertensive medication this morning          ALLERGIES:  Allergies   Allergen Reactions    Ceclor [Cefaclor] Hives       CURRENT MEDICATIONS:    Current Outpatient Prescriptions:     lisinopril (ZESTRIL) 20 mg tablet, Take 1 tablet (20 mg total) by mouth daily, Disp: 90 tablet, Rfl: 1    azithromycin (ZITHROMAX) 250 mg tablet, Take 2 tablets today then 1 tablet daily x 4 days, Disp: 6 tablet, Rfl: 0    gabapentin (NEURONTIN) 100 mg capsule, TAKE 2 CAPSULES 3 TIMES A DAY (Patient not taking: Reported on 10/10/2018), Disp: 180 capsule, Rfl: 1    gabapentin (NEURONTIN) 300 mg capsule, Take 1 capsule (300 mg total) by mouth 3 (three) times a day (Patient not taking: Reported on 10/10/2018 ), Disp: 270 capsule, Rfl: 1    ACTIVE PROBLEM LIST:  Patient Active Problem List   Diagnosis    Recurrent umbilical hernia    Generalized abdominal pain    Post-op pain    Hyperlipidemia    Hypertension    Obesity    Abdominal wall pain    Chronic pain syndrome    Abdominal pain, generalized    Polyuria    Diarrhea    Strain of rectus abdominis muscle    Acute bronchitis with bronchospasm    Acute non-recurrent pansinusitis       PAST MEDICAL HISTORY:  Past Medical History:   Diagnosis Date    Hypertension     Hypertension     Kidney stone     Recurrent umbilical hernia with incarceration     resolved 12/11/2017       PAST SURGICAL HISTORY:  Past Surgical History:   Procedure Laterality Date    CYSTOSCOPY      HERNIA REPAIR      x2    HIP DEBRIDEMENT Right     incision and drainage of skin of abcess hip    LAPAROSCOPY N/A 3/14/2018    Procedure: DIAGNOSTIC LAPAROSCOPY; LYSIS OF ADHESIONS;  Surgeon: Umer Sabillon MD;  [facility];  Service: General    NERVE BLOCK Bilateral 7/31/2018    Procedure: SPLANCHNIC NERVE BLOCK;  Surgeon: Tolu hWitehead MD;  [facility];  Service: Pain Management     [place] LAP, VENTRAL HERNIA REPAIR,REDUCIBLE N/A 12/6/2017    Procedure: LAPAROSCOPIC UMBILICAL HERNIA REPAIR WITH MESH;  Surgeon: Umer Sabillon MD;  [facility];  Service: General       FAMILY HISTORY:  Family History   Problem Relation Age of Onset    Hypertension Father     Cancer Father         throat        SOCIAL HISTORY:  Social History     Social History    Marital status: /Civil Union     Spouse name: N/A    Number of children: N/A    Years of education: N/A     Occupational History    Not on file  Social History Main Topics    Smoking status: Current Every Day Smoker     Packs/day: 0 20     Years: 30 00     Types: Cigarettes    Smokeless tobacco: Never Used    Alcohol use Yes      Comment: rarely    Drug use: No    Sexual activity: Yes     Partners: Female     Other Topics Concern    Not on file     Social History Narrative    No narrative on file       Review of Systems   Constitutional: Negative for activity change, chills, fatigue and fever  HENT: Positive for congestion, ear pain, postnasal drip, rhinorrhea, sinus pain, sinus pressure, sneezing and sore throat  Eyes: Positive for itching  Negative for discharge  Respiratory: Positive for cough  Negative for chest tightness and shortness of breath  Cardiovascular: Negative for chest pain, palpitations and leg swelling  Gastrointestinal: Positive for abdominal pain  Negative for blood in stool, constipation, diarrhea, nausea and vomiting  Genitourinary: Negative for difficulty urinating  Musculoskeletal: Negative for arthralgias and myalgias  Skin: Negative for rash  Allergic/Immunologic: Negative for immunocompromised state  Neurological: Positive for headaches  Negative for dizziness, syncope, weakness and light-headedness  Hematological: Negative for adenopathy  Does not bruise/bleed easily  Psychiatric/Behavioral: Negative for dysphoric mood  The patient is not nervous/anxious  Objective:  Vitals:    10/10/18 0829 10/10/18 0846   BP: (!) 172/100 140/90   BP Location: Left arm Left arm   Patient Position: Sitting Sitting   Cuff Size: Large    Pulse: 104    Resp: 18    Temp: 99 5 °F (37 5 °C)    TempSrc: Tympanic    SpO2: 93%    Weight: 127 kg (279 lb)    Height: 6' 1" (1 854 m)         Physical Exam   Constitutional: He is oriented to person, place, and time  He appears well-developed and well-nourished  No distress  Obese  When I entered the examining room patient was lying on the examining table  He had less trouble today sitting up than he has had in the past, although did complain of abdominal pain on changing the position  HENT:   HEENT-injected conjunctivae, TMs dull with fluid behind, nasal mucosa hyperemic with yellow/green mucoid drainage, injected red posterior pharynx with yellow/green post nasal drainage, erythema posterior pharynx, tender maxillary and frontal sinuses   Neck: Neck supple  Carotid bruit is not present  Cardiovascular: Normal rate, regular rhythm and normal heart sounds  Pulmonary/Chest: Effort normal  He has wheezes  Abdominal: Soft  Bowel sounds are normal  Mass: ed expiratory wheezes  There is tenderness (Continue tenderness at periumbilical area and bilateral rectus abdominus areas)  Musculoskeletal: He exhibits no edema  Lymphadenopathy:     He has no cervical adenopathy  Neurological: He is alert and oriented to person, place, and time  Skin: Skin is warm and dry  No rash noted  Psychiatric: He has a normal mood and affect  His behavior is normal    Nursing note and vitals reviewed          RESULTS:    No results found for this or any previous visit (from the past 1008 hour(s))  This note was created with voice recognition software  Phonic, grammatical and spelling errors may be present within the note as a result

## 2018-10-12 ENCOUNTER — TELEPHONE (OUTPATIENT)
Dept: PAIN MEDICINE | Facility: CLINIC | Age: 58
End: 2018-10-12

## 2018-10-12 NOTE — TELEPHONE ENCOUNTER
Patient 's wife called to f/u on disability claim info  Sent SUMAYA to be signed   When received will send last visit procedure noted to [facility]

## 2018-10-23 ENCOUNTER — TELEPHONE (OUTPATIENT)
Dept: PAIN MEDICINE | Facility: CLINIC | Age: 58
End: 2018-10-23

## 2018-10-23 NOTE — TELEPHONE ENCOUNTER
L/M for Tiffany Treviño as [employer] requested records thru [facility] and not sure if she still wants records sent   Both releases scanned into charts

## 2018-10-25 ENCOUNTER — OFFICE VISIT (OUTPATIENT)
Dept: PAIN MEDICINE | Facility: CLINIC | Age: 58
End: 2018-10-25
Payer: COMMERCIAL

## 2018-10-25 VITALS
SYSTOLIC BLOOD PRESSURE: 154 MMHG | HEIGHT: 73 IN | HEART RATE: 84 BPM | DIASTOLIC BLOOD PRESSURE: 110 MMHG | WEIGHT: 279 LBS | BODY MASS INDEX: 36.98 KG/M2

## 2018-10-25 DIAGNOSIS — G89.4 CHRONIC PAIN SYNDROME: ICD-10-CM

## 2018-10-25 DIAGNOSIS — R10.84 GENERALIZED ABDOMINAL PAIN: Primary | ICD-10-CM

## 2018-10-25 PROCEDURE — 99213 OFFICE O/P EST LOW 20 MIN: CPT | Performed by: ANESTHESIOLOGY

## 2018-10-25 NOTE — LETTER
October 25, 2018     Patient: Juan Blum   YOB: 1960   Date of Visit: 10/25/2018       To Whom it May Concern:    Georgia Conn is under my professional care  He was seen in my office on 10/25/2018  He may return to work on 10/30/18  If you have any questions or concerns, please don't hesitate to call           Sincerely,          Porsha Dent MD        CC: Claudia Cheney MA

## 2018-10-29 ENCOUNTER — TELEPHONE (OUTPATIENT)
Dept: PAIN MEDICINE | Facility: MEDICAL CENTER | Age: 58
End: 2018-10-29

## 2018-10-29 DIAGNOSIS — I10 ESSENTIAL HYPERTENSION: Primary | ICD-10-CM

## 2018-10-29 DIAGNOSIS — R73.01 IMPAIRED FASTING GLUCOSE: ICD-10-CM

## 2018-10-29 RX ORDER — AMLODIPINE BESYLATE 5 MG/1
5 TABLET ORAL DAILY
Qty: 30 TABLET | Refills: 5 | Status: SHIPPED | OUTPATIENT
Start: 2018-10-29 | End: 2019-04-24 | Stop reason: SDUPTHER

## 2018-10-29 NOTE — TELEPHONE ENCOUNTER
S/w CHRISTIANO Fox of [facility] for [employer]  He states that last return to work note, dated 10/25/18, needs to states with or without restrictions, for pt to be able to return to work  States his job is physical and needs to know what restrictions SI may want  Please advise  I did inform him that SI in the past has referred pt's to Dr Deirdre Garcia for exact work restrictions  However, this pt did see Dr Deirdre Garcia in August and, per ov note, Dr Deirdre Garcia said he could not comment on restrictions for this case  If any updates to note, nurse to fax to [phone]

## 2018-10-29 NOTE — TELEPHONE ENCOUNTER
Provider left message at [facility] on 10/29/18 at 1054 am, need to discuss restricts for patient (return to work)  Call back number is [phone]

## 2018-10-30 NOTE — TELEPHONE ENCOUNTER
Pt is calling stating that this is so important and needs to be done today and it needs to say no restrictions  Please advise  Fax number is in previous task

## 2018-11-07 ENCOUNTER — TELEPHONE (OUTPATIENT)
Dept: PAIN MEDICINE | Facility: MEDICAL CENTER | Age: 58
End: 2018-11-07

## 2018-11-07 NOTE — TELEPHONE ENCOUNTER
[employer] left a message in voicemail at 3:43 yesterday afternoon regarding patient's disability claim  Asking if Dr Rudy Hauser would like to participate in an optional peer to peer review with Dr Yasmin Huerta - re: any work restrictions or limitations  Please call [phone]

## 2018-11-12 ENCOUNTER — OFFICE VISIT (OUTPATIENT)
Dept: INTERNAL MEDICINE CLINIC | Facility: CLINIC | Age: 58
End: 2018-11-12
Payer: COMMERCIAL

## 2018-11-12 VITALS
HEIGHT: 73 IN | HEART RATE: 94 BPM | BODY MASS INDEX: 37.14 KG/M2 | OXYGEN SATURATION: 96 % | DIASTOLIC BLOOD PRESSURE: 90 MMHG | SYSTOLIC BLOOD PRESSURE: 130 MMHG | RESPIRATION RATE: 20 BRPM | WEIGHT: 280.2 LBS

## 2018-11-12 DIAGNOSIS — I10 ESSENTIAL HYPERTENSION: Primary | ICD-10-CM

## 2018-11-12 DIAGNOSIS — N28.1 RENAL CYST, LEFT: ICD-10-CM

## 2018-11-12 PROBLEM — J20.9 ACUTE BRONCHITIS WITH BRONCHOSPASM: Status: RESOLVED | Noted: 2018-10-10 | Resolved: 2018-11-12

## 2018-11-12 PROCEDURE — 3008F BODY MASS INDEX DOCD: CPT | Performed by: PHYSICIAN ASSISTANT

## 2018-11-12 PROCEDURE — 99213 OFFICE O/P EST LOW 20 MIN: CPT | Performed by: PHYSICIAN ASSISTANT

## 2018-11-12 NOTE — PROGRESS NOTES
Assessment/Plan:   Patient Instructions   Continue current medications for his blood pressure  Blood pressure is under good control at this time  Have previously requested labs done, I will discuss results when they are available  Also have renal protocol CT done when you can  Will discuss those results when available  Schedule follow-up in 4 months, sooner as needed  Return in about 4 months (around 3/12/2019) for Next scheduled follow up  Diagnoses and all orders for this visit:    Essential hypertension    Renal cyst, left  -     CT renal protocol; Future          Subjective:      Patient ID: Gerri Guzman is a 62 y o  male  Follow-up    Hypertension:  Much better blood pressure control with the addition of amlodipine to his lisinopril  Patient reports feeling better, denies any further fluttering feeling or ringing in his ears  No further headaches  No complaint of chest pain, palpitations, shortness of breath, edema, lightheadedness  Patient states his previous abdominal pain has improved significantly since his last pain medicine injection  He is back to work, he believes functioning as normal and having no difficulties  He had followed with surgery who repeated a CT of his abdomen and pelvis showing a questionable left renal cyst   The CT report did recommend a follow-up CT renal protocol  This will be provided for the patient  Denies any voiding difficulties, no gross hematuria          ALLERGIES:  Allergies   Allergen Reactions    Ceclor [Cefaclor] Hives       CURRENT MEDICATIONS:    Current Outpatient Prescriptions:     amLODIPine (NORVASC) 5 mg tablet, Take 1 tablet (5 mg total) by mouth daily, Disp: 30 tablet, Rfl: 5    lisinopril (ZESTRIL) 20 mg tablet, Take 1 tablet (20 mg total) by mouth daily, Disp: 90 tablet, Rfl: 1    ACTIVE PROBLEM LIST:  Patient Active Problem List   Diagnosis    Recurrent umbilical hernia    Generalized abdominal pain    Post-op pain  Hyperlipidemia    Hypertension    Obesity    Abdominal wall pain    Chronic pain syndrome    Abdominal pain, generalized    Polyuria    Diarrhea    Strain of rectus abdominis muscle    Acute non-recurrent pansinusitis    Impaired fasting glucose    Renal cyst, left       PAST MEDICAL HISTORY:  Past Medical History:   Diagnosis Date    Hypertension     Hypertension     Kidney stone     Recurrent umbilical hernia with incarceration     resolved 12/11/2017       PAST SURGICAL HISTORY:  Past Surgical History:   Procedure Laterality Date    CYSTOSCOPY      HERNIA REPAIR      x2    HIP DEBRIDEMENT Right     incision and drainage of skin of abcess hip    LAPAROSCOPY N/A 3/14/2018    Procedure: DIAGNOSTIC LAPAROSCOPY; LYSIS OF ADHESIONS;  Surgeon: Jaida Jacob MD;  [facility];  Service: General    NERVE BLOCK Bilateral 7/31/2018    Procedure: SPLANCHNIC NERVE BLOCK;  Surgeon: Anurag Justice MD;  [facility];  Service: Pain Management     ME LAP, VENTRAL HERNIA REPAIR,REDUCIBLE N/A 12/6/2017    Procedure: LAPAROSCOPIC UMBILICAL HERNIA REPAIR WITH MESH;  Surgeon: Jaida Jacob MD;  [facility];  Service: General       FAMILY HISTORY:  Family History   Problem Relation Age of Onset    Hypertension Father     Cancer Father         throat        SOCIAL HISTORY:  Social History     Social History    Marital status: /Civil Union     Spouse name: N/A    Number of children: N/A    Years of education: N/A     Occupational History    Not on file       Social History Main Topics    Smoking status: Current Every Day Smoker     Packs/day: 0 20     Years: 30 00     Types: Cigarettes    Smokeless tobacco: Never Used    Alcohol use Yes      Comment: rarely    Drug use: No    Sexual activity: Yes     Partners: Female     Other Topics Concern    Not on file     Social History Narrative    No narrative on file       Review of Systems   Constitutional: Negative for activity change, chills, fatigue and fever  HENT: Negative for congestion  Eyes: Negative for discharge  Respiratory: Negative for cough, chest tightness and shortness of breath  Cardiovascular: Negative for chest pain, palpitations and leg swelling  Gastrointestinal: Negative for abdominal pain  Genitourinary: Negative for difficulty urinating  Musculoskeletal: Negative for arthralgias and myalgias  Skin: Negative for rash  Allergic/Immunologic: Negative for immunocompromised state  Neurological: Negative for dizziness, syncope, weakness, light-headedness and headaches  Hematological: Negative for adenopathy  Does not bruise/bleed easily  Psychiatric/Behavioral: Negative for dysphoric mood  The patient is not nervous/anxious  Objective:  Vitals:    11/12/18 1520   BP: 130/90   BP Location: Left arm   Patient Position: Sitting   Cuff Size: Large   Pulse: 94   Resp: 20   SpO2: 96%   Weight: 127 kg (280 lb 3 2 oz)   Height: 6' 1" (1 854 m)        Physical Exam   Constitutional: He is oriented to person, place, and time  He appears well-developed and well-nourished  No distress  Obese   Neck: Neck supple  No JVD present  Carotid bruit is not present  Cardiovascular: Normal rate, regular rhythm and normal heart sounds  Pulmonary/Chest: Effort normal and breath sounds normal    Musculoskeletal: He exhibits no edema  Lymphadenopathy:     He has no cervical adenopathy  Neurological: He is alert and oriented to person, place, and time  Skin: Skin is warm and dry  No rash noted  Psychiatric: He has a normal mood and affect  His behavior is normal    Nursing note and vitals reviewed          RESULTS:    Recent Results (from the past 1008 hour(s))   CBC and differential    Collection Time: 10/10/18  9:18 AM   Result Value Ref Range    WBC 10 96 (H) 4 31 - 10 16 Thousand/uL    RBC 5 27 3 88 - 5 62 Million/uL    Hemoglobin 16 2 12 0 - 17 0 g/dL    Hematocrit 46 2 36 5 - 49 3 % MCV 88 82 - 98 fL    MCH 30 7 26 8 - 34 3 pg    MCHC 35 1 31 4 - 37 4 g/dL    RDW 12 6 11 6 - 15 1 %    MPV 9 0 8 9 - 12 7 fL    Platelets 591 537 - 100 Thousands/uL    nRBC 0 /100 WBCs    Neutrophils Relative 77 (H) 43 - 75 %    Immat GRANS % 1 0 - 2 %    Lymphocytes Relative 10 (L) 14 - 44 %    Monocytes Relative 7 4 - 12 %    Eosinophils Relative 4 0 - 6 %    Basophils Relative 1 0 - 1 %    Neutrophils Absolute 8 50 (H) 1 85 - 7 62 Thousands/µL    Immature Grans Absolute 0 06 0 00 - 0 20 Thousand/uL    Lymphocytes Absolute 1 09 0 60 - 4 47 Thousands/µL    Monocytes Absolute 0 80 0 17 - 1 22 Thousand/µL    Eosinophils Absolute 0 43 0 00 - 0 61 Thousand/µL    Basophils Absolute 0 08 0 00 - 0 10 Thousands/µL   Comprehensive metabolic panel    Collection Time: 10/10/18  9:18 AM   Result Value Ref Range    Sodium 135 (L) 136 - 145 mmol/L    Potassium 3 3 (L) 3 5 - 5 3 mmol/L    Chloride 99 (L) 100 - 108 mmol/L    CO2 29 21 - 32 mmol/L    ANION GAP 7 4 - 13 mmol/L    BUN 13 5 - 25 mg/dL    Creatinine 1 14 0 60 - 1 30 mg/dL    Glucose, Fasting 135 (H) 65 - 99 mg/dL    Calcium 9 0 8 3 - 10 1 mg/dL    AST 11 5 - 45 U/L    ALT 23 12 - 78 U/L    Alkaline Phosphatase 82 46 - 116 U/L    Total Protein 7 8 6 4 - 8 2 g/dL    Albumin 3 5 3 5 - 5 0 g/dL    Total Bilirubin 0 90 0 20 - 1 00 mg/dL    eGFR 71 ml/min/1 73sq m       This note was created with voice recognition software  Phonic, grammatical and spelling errors may be present within the note as a result

## 2018-11-12 NOTE — PATIENT INSTRUCTIONS
Continue current medications for his blood pressure  Blood pressure is under good control at this time  Have previously requested labs done, I will discuss results when they are available  Also have renal protocol CT done when you can  Will discuss those results when available  Schedule follow-up in 4 months, sooner as needed

## 2018-11-19 NOTE — TELEPHONE ENCOUNTER
Paperwork on your desk for review regarding work restrictions  If you disagree, form needs to be faxed back by 11/21/18  If you agree, form needs to be scanned to pt's chart

## 2018-11-26 ENCOUNTER — TRANSCRIBE ORDERS (OUTPATIENT)
Dept: RADIOLOGY | Facility: CLINIC | Age: 58
End: 2018-11-26

## 2018-11-29 ENCOUNTER — HOSPITAL ENCOUNTER (OUTPATIENT)
Dept: CT IMAGING | Facility: CLINIC | Age: 58
Discharge: HOME | End: 2018-11-29
Payer: COMMERCIAL

## 2018-11-29 DIAGNOSIS — N28.1 RENAL CYST, LEFT: ICD-10-CM

## 2018-11-29 PROCEDURE — 74178 CT ABD&PLV WO CNTR FLWD CNTR: CPT

## 2018-11-29 RX ADMIN — IOHEXOL 100 ML: 350 INJECTION, SOLUTION INTRAVENOUS at 13:49

## 2018-12-08 ENCOUNTER — APPOINTMENT (OUTPATIENT)
Dept: LAB | Facility: HOSPITAL | Age: 58
End: 2018-12-08
Payer: COMMERCIAL

## 2018-12-08 DIAGNOSIS — I10 ESSENTIAL HYPERTENSION: ICD-10-CM

## 2018-12-08 DIAGNOSIS — R73.01 IMPAIRED FASTING GLUCOSE: ICD-10-CM

## 2018-12-08 LAB
ALBUMIN SERPL BCP-MCNC: 3.6 G/DL (ref 3.5–5)
ALP SERPL-CCNC: 70 U/L (ref 46–116)
ALT SERPL W P-5'-P-CCNC: 32 U/L (ref 12–78)
ANION GAP SERPL CALCULATED.3IONS-SCNC: 7 MMOL/L (ref 4–13)
AST SERPL W P-5'-P-CCNC: 18 U/L (ref 5–45)
BILIRUB SERPL-MCNC: 0.5 MG/DL (ref 0.2–1)
BUN SERPL-MCNC: 17 MG/DL (ref 5–25)
CALCIUM SERPL-MCNC: 8.9 MG/DL (ref 8.3–10.1)
CHLORIDE SERPL-SCNC: 103 MMOL/L (ref 100–108)
CO2 SERPL-SCNC: 32 MMOL/L (ref 21–32)
CREAT SERPL-MCNC: 1.12 MG/DL (ref 0.6–1.3)
EST. AVERAGE GLUCOSE BLD GHB EST-MCNC: 137 MG/DL
GFR SERPL CREATININE-BSD FRML MDRD: 72 ML/MIN/1.73SQ M
GLUCOSE P FAST SERPL-MCNC: 120 MG/DL (ref 65–99)
HBA1C MFR BLD: 6.4 % (ref 4.2–6.3)
POTASSIUM SERPL-SCNC: 4 MMOL/L (ref 3.5–5.3)
PROT SERPL-MCNC: 7.5 G/DL (ref 6.4–8.2)
SODIUM SERPL-SCNC: 142 MMOL/L (ref 136–145)

## 2018-12-08 PROCEDURE — 80053 COMPREHEN METABOLIC PANEL: CPT

## 2018-12-08 PROCEDURE — 83036 HEMOGLOBIN GLYCOSYLATED A1C: CPT

## 2018-12-08 PROCEDURE — 36415 COLL VENOUS BLD VENIPUNCTURE: CPT

## 2018-12-10 DIAGNOSIS — I10 ESSENTIAL HYPERTENSION: ICD-10-CM

## 2018-12-10 RX ORDER — LISINOPRIL 20 MG/1
TABLET ORAL
Qty: 90 TABLET | Refills: 1 | Status: SHIPPED | OUTPATIENT
Start: 2018-12-10 | End: 2019-06-23 | Stop reason: SDUPTHER

## 2019-02-09 ENCOUNTER — HOSPITAL ENCOUNTER (EMERGENCY)
Facility: HOSPITAL | Age: 59
Discharge: HOME | End: 2019-02-09
Attending: EMERGENCY MEDICINE | Admitting: EMERGENCY MEDICINE
Payer: COMMERCIAL

## 2019-02-09 ENCOUNTER — APPOINTMENT (EMERGENCY)
Dept: CT IMAGING | Facility: HOSPITAL | Age: 59
End: 2019-02-09
Payer: COMMERCIAL

## 2019-02-09 VITALS
HEART RATE: 96 BPM | BODY MASS INDEX: 37.93 KG/M2 | OXYGEN SATURATION: 96 % | DIASTOLIC BLOOD PRESSURE: 87 MMHG | HEIGHT: 73 IN | RESPIRATION RATE: 15 BRPM | TEMPERATURE: 98 F | SYSTOLIC BLOOD PRESSURE: 159 MMHG | WEIGHT: 286.16 LBS

## 2019-02-09 DIAGNOSIS — R11.2 NAUSEA, VOMITING, AND DIARRHEA: ICD-10-CM

## 2019-02-09 DIAGNOSIS — K52.9 ACUTE GASTROENTERITIS: Primary | ICD-10-CM

## 2019-02-09 DIAGNOSIS — R19.7 NAUSEA, VOMITING, AND DIARRHEA: ICD-10-CM

## 2019-02-09 LAB
ALBUMIN SERPL BCP-MCNC: 3.8 G/DL (ref 3.5–5)
ALP SERPL-CCNC: 78 U/L (ref 46–116)
ALT SERPL W P-5'-P-CCNC: 28 U/L (ref 12–78)
ANION GAP SERPL CALCULATED.3IONS-SCNC: 7 MMOL/L (ref 4–13)
AST SERPL W P-5'-P-CCNC: 19 U/L (ref 5–45)
BASOPHILS # BLD AUTO: 0.05 THOUSANDS/ΜL (ref 0–0.1)
BASOPHILS NFR BLD AUTO: 0 % (ref 0–1)
BILIRUB SERPL-MCNC: 0.5 MG/DL (ref 0.2–1)
BILIRUB UR QL STRIP: NEGATIVE
BUN SERPL-MCNC: 15 MG/DL (ref 5–25)
CALCIUM SERPL-MCNC: 8.6 MG/DL (ref 8.3–10.1)
CHLORIDE SERPL-SCNC: 104 MMOL/L (ref 100–108)
CLARITY UR: CLEAR
CO2 SERPL-SCNC: 28 MMOL/L (ref 21–32)
COLOR UR: YELLOW
CREAT SERPL-MCNC: 1.12 MG/DL (ref 0.6–1.3)
EOSINOPHIL # BLD AUTO: 0.26 THOUSAND/ΜL (ref 0–0.61)
EOSINOPHIL NFR BLD AUTO: 2 % (ref 0–6)
ERYTHROCYTE [DISTWIDTH] IN BLOOD BY AUTOMATED COUNT: 13.2 % (ref 11.6–15.1)
GFR SERPL CREATININE-BSD FRML MDRD: 72 ML/MIN/1.73SQ M
GLUCOSE SERPL-MCNC: 135 MG/DL (ref 65–140)
GLUCOSE UR STRIP-MCNC: NEGATIVE MG/DL
HCT VFR BLD AUTO: 50.9 % (ref 36.5–49.3)
HGB BLD-MCNC: 17.2 G/DL (ref 12–17)
HGB UR QL STRIP.AUTO: NEGATIVE
IMM GRANULOCYTES # BLD AUTO: 0.06 THOUSAND/UL (ref 0–0.2)
IMM GRANULOCYTES NFR BLD AUTO: 1 % (ref 0–2)
KETONES UR STRIP-MCNC: NEGATIVE MG/DL
LEUKOCYTE ESTERASE UR QL STRIP: NEGATIVE
LIPASE SERPL-CCNC: 153 U/L (ref 73–393)
LYMPHOCYTES # BLD AUTO: 0.31 THOUSANDS/ΜL (ref 0.6–4.47)
LYMPHOCYTES NFR BLD AUTO: 3 % (ref 14–44)
MCH RBC QN AUTO: 30.3 PG (ref 26.8–34.3)
MCHC RBC AUTO-ENTMCNC: 33.8 G/DL (ref 31.4–37.4)
MCV RBC AUTO: 90 FL (ref 82–98)
MONOCYTES # BLD AUTO: 0.52 THOUSAND/ΜL (ref 0.17–1.22)
MONOCYTES NFR BLD AUTO: 5 % (ref 4–12)
NEUTROPHILS # BLD AUTO: 10.33 THOUSANDS/ΜL (ref 1.85–7.62)
NEUTS SEG NFR BLD AUTO: 89 % (ref 43–75)
NITRITE UR QL STRIP: NEGATIVE
NRBC BLD AUTO-RTO: 0 /100 WBCS
PH UR STRIP.AUTO: 6 [PH] (ref 4.5–8)
PLATELET # BLD AUTO: 192 THOUSANDS/UL (ref 149–390)
PMV BLD AUTO: 9.5 FL (ref 8.9–12.7)
POTASSIUM SERPL-SCNC: 3.9 MMOL/L (ref 3.5–5.3)
PROT SERPL-MCNC: 7.7 G/DL (ref 6.4–8.2)
PROT UR STRIP-MCNC: NEGATIVE MG/DL
RBC # BLD AUTO: 5.67 MILLION/UL (ref 3.88–5.62)
SODIUM SERPL-SCNC: 139 MMOL/L (ref 136–145)
SP GR UR STRIP.AUTO: 1.02 (ref 1–1.03)
UROBILINOGEN UR QL STRIP.AUTO: 0.2 E.U./DL
WBC # BLD AUTO: 11.53 THOUSAND/UL (ref 4.31–10.16)

## 2019-02-09 PROCEDURE — 85025 COMPLETE CBC W/AUTO DIFF WBC: CPT

## 2019-02-09 PROCEDURE — 83690 ASSAY OF LIPASE: CPT

## 2019-02-09 PROCEDURE — 80053 COMPREHEN METABOLIC PANEL: CPT

## 2019-02-09 PROCEDURE — 96374 THER/PROPH/DIAG INJ IV PUSH: CPT

## 2019-02-09 PROCEDURE — 96375 TX/PRO/DX INJ NEW DRUG ADDON: CPT

## 2019-02-09 PROCEDURE — 36415 COLL VENOUS BLD VENIPUNCTURE: CPT

## 2019-02-09 PROCEDURE — 96361 HYDRATE IV INFUSION ADD-ON: CPT

## 2019-02-09 PROCEDURE — 74177 CT ABD & PELVIS W/CONTRAST: CPT

## 2019-02-09 PROCEDURE — 81003 URINALYSIS AUTO W/O SCOPE: CPT | Performed by: EMERGENCY MEDICINE

## 2019-02-09 PROCEDURE — 99284 EMERGENCY DEPT VISIT MOD MDM: CPT

## 2019-02-09 RX ORDER — DICYCLOMINE HCL 20 MG
20 TABLET ORAL ONCE
Status: COMPLETED | OUTPATIENT
Start: 2019-02-09 | End: 2019-02-09

## 2019-02-09 RX ORDER — KETOROLAC TROMETHAMINE 30 MG/ML
15 INJECTION, SOLUTION INTRAMUSCULAR; INTRAVENOUS ONCE
Status: COMPLETED | OUTPATIENT
Start: 2019-02-09 | End: 2019-02-09

## 2019-02-09 RX ORDER — ONDANSETRON HYDROCHLORIDE 4 MG/5ML
4 SOLUTION ORAL ONCE
Status: COMPLETED | OUTPATIENT
Start: 2019-02-09 | End: 2019-02-09

## 2019-02-09 RX ORDER — ONDANSETRON 2 MG/ML
4 INJECTION INTRAMUSCULAR; INTRAVENOUS ONCE
Status: COMPLETED | OUTPATIENT
Start: 2019-02-09 | End: 2019-02-09

## 2019-02-09 RX ORDER — DICYCLOMINE HCL 20 MG
20 TABLET ORAL EVERY 6 HOURS PRN
Qty: 12 TABLET | Refills: 0 | Status: SHIPPED | OUTPATIENT
Start: 2019-02-09 | End: 2019-08-23 | Stop reason: ALTCHOICE

## 2019-02-09 RX ORDER — ONDANSETRON 4 MG/1
4 TABLET, ORALLY DISINTEGRATING ORAL EVERY 8 HOURS PRN
Qty: 20 TABLET | Refills: 0 | Status: SHIPPED | OUTPATIENT
Start: 2019-02-09 | End: 2019-08-23 | Stop reason: ALTCHOICE

## 2019-02-09 RX ADMIN — DICYCLOMINE HYDROCHLORIDE 20 MG: 20 TABLET ORAL at 13:41

## 2019-02-09 RX ADMIN — ONDANSETRON 4 MG: 2 INJECTION INTRAMUSCULAR; INTRAVENOUS at 14:48

## 2019-02-09 RX ADMIN — IOHEXOL 100 ML: 350 INJECTION, SOLUTION INTRAVENOUS at 14:00

## 2019-02-09 RX ADMIN — KETOROLAC TROMETHAMINE 15 MG: 30 INJECTION, SOLUTION INTRAMUSCULAR at 13:42

## 2019-02-09 RX ADMIN — SODIUM CHLORIDE 1000 ML: 0.9 INJECTION, SOLUTION INTRAVENOUS at 13:41

## 2019-02-09 RX ADMIN — ONDANSETRON HYDROCHLORIDE 4 MG: 4 SOLUTION ORAL at 12:55

## 2019-02-09 NOTE — ED NOTES
Gave pt gingeral and crackers for po challenge per provider     Shameka Eisenberg, RN  02/09/19 0755

## 2019-02-09 NOTE — ED PROVIDER NOTES
History  Chief Complaint   Patient presents with    Abdominal Pain     pt was just seen at urgent care and sent here  Pt states hes had abd pain for the past weekand  n/v/d started earlier today     Patient is a 70-year-old male with past medical history of hypertension, prior kidney stones, umbilical hernia status post repair with mesh and subsequent surgical removal of mesh, presents to the emergency department complaining of abdominal pain, nausea, vomiting and diarrhea that started around 1:00 a m  this morning  Patient reports he woke up with nausea and abdominal pain and proceeded to have several episodes of nonbilious and nonbloody vomiting as well as nonbloody diarrhea  He reports having about 5-6 episodes of each  The abdominal pain has been constant and localized to the left lower abdomen and left flank  He describes it as a fluttering " He reports feeling generally weak and has diffuse body aches  He went to an urgent care center prior to this who noted a fever of 100 3 and recommended he come to the ED for further evaluation  Patient reports multiple family members at home have had similar GI symptoms  On review of systems he does admit to mild frontal headache  He denies any dizziness or near syncope, visual disturbance or eye pain, neck pain or stiffness, URI symptoms, cough, chest pain, palpitations, dyspnea, abdominal distension, hematemesis, blood per rectum or melena, dysuria, change in urinary frequency, hematuria, skin rash or color change, extremity swelling or pain, lateralizing or ascending extremity weakness or paresthesia or other focal neurologic deficits  Denies any recent travel outside the country, recent unusual food intake or recent antibiotic use  No prior history of diverticulitis          History provided by:  Patient and spouse   used: No    Abdominal Pain   Associated symptoms: diarrhea, fatigue, nausea and vomiting    Associated symptoms: no chest pain, no chills, no constipation, no cough, no dysuria, no fever, no hematuria, no shortness of breath and no sore throat        Prior to Admission Medications   Prescriptions Last Dose Informant Patient Reported? Taking? amLODIPine (NORVASC) 5 mg tablet   No No   Sig: Take 1 tablet (5 mg total) by mouth daily   lisinopril (ZESTRIL) 20 mg tablet   No No   Sig: TAKE 1 TABLET BY MOUTH EVERY DAY      Facility-Administered Medications: None       Past Medical History:   Diagnosis Date    Hypertension     Hypertension     Kidney stone     Recurrent umbilical hernia with incarceration     resolved 12/11/2017       Past Surgical History:   Procedure Laterality Date    CYSTOSCOPY      HERNIA REPAIR      x2    HIP DEBRIDEMENT Right     incision and drainage of skin of abcess hip    LAPAROSCOPY N/A 3/14/2018    Procedure: DIAGNOSTIC LAPAROSCOPY; LYSIS OF ADHESIONS;  Surgeon: Umer Sabillon MD;  [facility];  Service: General    NERVE BLOCK Bilateral 7/31/2018    Procedure: SPLANCHNIC NERVE BLOCK;  Surgeon: Tolu Whitehead MD;  [facility];  Service: Pain Management     OK LAP, VENTRAL HERNIA REPAIR,REDUCIBLE N/A 12/6/2017    Procedure: LAPAROSCOPIC UMBILICAL HERNIA REPAIR WITH MESH;  Surgeon: Umer Sabillon MD;  [facility];  Service: General       Family History   Problem Relation Age of Onset    Hypertension Father     Cancer Father         throat      I have reviewed and agree with the history as documented  Social History   Substance Use Topics    Smoking status: Current Every Day Smoker     Packs/day: 0 20     Years: 30 00     Types: Cigarettes    Smokeless tobacco: Never Used    Alcohol use Yes      Comment: rarely        Review of Systems   Constitutional: Positive for fatigue  Negative for chills and fever  HENT: Negative for congestion, ear pain, rhinorrhea and sore throat  Eyes: Negative for photophobia, pain and visual disturbance     Respiratory: Negative for cough, chest tightness, shortness of breath and wheezing  Cardiovascular: Negative for chest pain and palpitations  Gastrointestinal: Positive for abdominal pain, diarrhea, nausea and vomiting  Negative for abdominal distention, blood in stool and constipation  Genitourinary: Negative for dysuria, flank pain, frequency and hematuria  Musculoskeletal: Negative for back pain, neck pain and neck stiffness  Skin: Negative for color change, pallor, rash and wound  Allergic/Immunologic: Negative for immunocompromised state  Neurological: Positive for weakness  Negative for dizziness, syncope, light-headedness, numbness and headaches  Hematological: Negative for adenopathy  Psychiatric/Behavioral: Negative for confusion and decreased concentration  All other systems reviewed and are negative  Physical Exam  Physical Exam   Constitutional: He is oriented to person, place, and time  He appears well-developed and well-nourished  No distress  HENT:   Head: Normocephalic and atraumatic  Mouth/Throat: Oropharynx is clear and moist  No oropharyngeal exudate  Eyes: Pupils are equal, round, and reactive to light  Conjunctivae and EOM are normal    Neck: Normal range of motion  Neck supple  No JVD present  Cardiovascular: Normal rate, regular rhythm, normal heart sounds and intact distal pulses  Exam reveals no gallop and no friction rub  No murmur heard  Pulmonary/Chest: Effort normal and breath sounds normal  No respiratory distress  He has no wheezes  He has no rales  Abdominal: Soft  Bowel sounds are normal  He exhibits no distension  There is tenderness  There is no rebound and no guarding  Mild left upper and left mid abdominal tenderness  Musculoskeletal: Normal range of motion  He exhibits no edema or tenderness  Neurological: He is alert and oriented to person, place, and time  No cranial nerve deficit  No gross motor or sensory deficits  Skin: Skin is warm and dry   No rash noted  He is not diaphoretic  No pallor  Psychiatric: He has a normal mood and affect  His behavior is normal    Nursing note and vitals reviewed        Vital Signs  ED Triage Vitals [02/09/19 1241]   Temperature Pulse Respirations Blood Pressure SpO2   98 °F (36 7 °C) 94 14 169/100 96 %      Temp Source Heart Rate Source Patient Position - Orthostatic VS BP Location FiO2 (%)   Oral Monitor -- Right arm --      Pain Score       7         Vitals:    02/09/19 1241 02/09/19 1341 02/09/19 1443   BP: 169/100 163/94 159/87   BP Location: Right arm Right arm Right arm   Pulse: 94 94 96   Resp: 14 15 15   Temp: 98 °F (36 7 °C)     TempSrc: Oral     SpO2: 96% 97% 96%   Weight: 130 kg (286 lb 2 5 oz)     Height: 6' 1" (1 854 m)       Visual Acuity      ED Medications  Medications   ondansetron [facility] oral solution 4 mg (4 mg Oral Given 2/9/19 1255)   sodium chloride 0 9 % bolus 1,000 mL (1,000 mL Intravenous New Bag 2/9/19 1341)   dicyclomine (BENTYL) tablet 20 mg (20 mg Oral Given 2/9/19 1341)   ketorolac (TORADOL) injection 15 mg (15 mg Intravenous Given 2/9/19 1342)   iohexol (OMNIPAQUE) 350 MG/ML injection (SINGLE-DOSE) 100 mL (100 mL Intravenous Given 2/9/19 1400)   ondansetron (ZOFRAN) injection 4 mg (4 mg Intravenous Given 2/9/19 1448)       Diagnostic Studies  Results Reviewed     Procedure Component Value Units Date/Time    Comprehensive metabolic panel [[id number]] Collected:  02/09/19 1302    Lab Status:  Final result Specimen:  Blood from Arm, Right Updated:  02/09/19 1329     Sodium 139 mmol/L      Potassium 3 9 mmol/L      Chloride 104 mmol/L      CO2 28 mmol/L      ANION GAP 7 mmol/L      BUN 15 mg/dL      Creatinine 1 12 mg/dL      Glucose 135 mg/dL      Calcium 8 6 mg/dL      AST 19 U/L      ALT 28 U/L      Alkaline Phosphatase 78 U/L      Total Protein 7 7 g/dL      Albumin 3 8 g/dL      Total Bilirubin 0 50 mg/dL      eGFR 72 ml/min/1 73sq m     Narrative:         National Kidney Disease Education Program recommendations are as follows:  GFR calculation is accurate only with a steady state creatinine  Chronic Kidney disease less than 60 ml/min/1 73 sq  meters  Kidney failure less than 15 ml/min/1 73 sq  meters      Lipase [[id number]]  (Normal) Collected:  02/09/19 1302    Lab Status:  Final result Specimen:  Blood from Arm, Right Updated:  02/09/19 1329     Lipase 153 u/L     UA w Reflex to Microscopic [[id number]] Collected:  02/09/19 1309    Lab Status:  Final result Specimen:  Urine from Urine, Clean Catch Updated:  02/09/19 1315     Color, UA Yellow     Clarity, UA Clear     Specific Gravity, UA 1 025     pH, UA 6 0     Leukocytes, UA Negative     Nitrite, UA Negative     Protein, UA Negative mg/dl      Glucose, UA Negative mg/dl      Ketones, UA Negative mg/dl      Urobilinogen, UA 0 2 E U /dl      Bilirubin, UA Negative     Blood, UA Negative    CBC and differential [[id number]]  (Abnormal) Collected:  02/09/19 1303    Lab Status:  Final result Specimen:  Blood from Arm, Right Updated:  02/09/19 1314     WBC 11 53 (H) Thousand/uL      RBC 5 67 (H) Million/uL      Hemoglobin 17 2 (H) g/dL      Hematocrit 50 9 (H) %      MCV 90 fL      MCH 30 3 pg      MCHC 33 8 g/dL      RDW 13 2 %      MPV 9 5 fL      Platelets 288 Thousands/uL      nRBC 0 /100 WBCs      Neutrophils Relative 89 (H) %      Immat GRANS % 1 %      Lymphocytes Relative 3 (L) %      Monocytes Relative 5 %      Eosinophils Relative 2 %      Basophils Relative 0 %      Neutrophils Absolute 10 33 (H) Thousands/µL      Immature Grans Absolute 0 06 Thousand/uL      Lymphocytes Absolute 0 31 (L) Thousands/µL      Monocytes Absolute 0 52 Thousand/µL      Eosinophils Absolute 0 26 Thousand/µL      Basophils Absolute 0 05 Thousands/µL                  CT abdomen pelvis with contrast   Final Result by Benton Gaucher, MD (02/09 1418)   Development of fluid-filled loops of mildly dilated small bowel throughout the abdomen suspicious for viral gastroenteritis Tiny nonobstructive bilateral renal calculi      Small simple left renal cysts                     Workstation performed: [id number]                    Procedures  Procedures       Phone Contacts  ED Phone Contact    ED Course  ED Course as of Feb 09 1515   Sat Feb 09, 2019   1511 Patient reassessed and overall feeling better  He has not vomited after trying to drink ginger ale  Discussed essentially normal workup and CT scan findings consistent with gastroenteritis  Will send home with scripts for Bentyl and Zofran  Discussed ED return parameters  MDM  Number of Diagnoses or Management Options  Diagnosis management comments: 54-year-old male presents with acute nausea, vomiting, diarrhea and abdominal pain that started early this morning  Multiple recent sick contacts in the household with similar symptoms  Most likely patient has acute infectious gastroenteritis  Will check abdominal labs and given age and location of pain, will obtain CT scan of the abdomen and pelvis to rule out diverticulitis or other etiology  Will give IV fluids, Zofran, Toradol and Bentyl  Advised patient to notify nursing if he has to have a bowel movement so I can collect a specimen and test for bacterial panel  Low suspicion for C diff colitis         Amount and/or Complexity of Data Reviewed  Clinical lab tests: ordered and reviewed  Tests in the radiology section of CPT®: ordered and reviewed  Independent visualization of images, tracings, or specimens: yes        Disposition  Final diagnoses:   Acute gastroenteritis   Nausea, vomiting, and diarrhea     Time reflects when diagnosis was documented in both MDM as applicable and the Disposition within this note     Time User Action Codes Description Comment    2/9/2019  3:11 PM Sandy DOWLING Add [K52 9] Acute gastroenteritis     2/9/2019  3:12 PM Sandy DOWLING Add [R11 2,  R19 7] Nausea, vomiting, and diarrhea       ED Disposition     ED Disposition Condition Date/Time Comment    Discharge  Sat Feb 9, 2019  3:12 PM Meenu Guerra discharge to home/self care  Condition at discharge: Stable        Follow-up Information     Follow up With Specialties Details Why Contact Info Additional Information    Marianela Vann MD Internal Medicine Schedule an appointment as soon as possible for a visit  [phone] Emergency Department Emergency Medicine Go to If symptoms worsen [address] [place] [id number] [phone] [place] ED, [address] [place] [address], [facility]          Patient's Medications   Discharge Prescriptions    DICYCLOMINE (BENTYL) 20 MG TABLET    Take 1 tablet (20 mg total) by mouth every 6 (six) hours as needed (abdominal pain or diarrhea)       Start Date: 2/9/2019  End Date: --       Order Dose: 20 mg       Quantity: 12 tablet    Refills: 0    ONDANSETRON (ZOFRAN-ODT) 4 MG DISINTEGRATING TABLET    Take 1 tablet (4 mg total) by mouth every 8 (eight) hours as needed for nausea or vomiting       Start Date: 2/9/2019  End Date: --       Order Dose: 4 mg       Quantity: 20 tablet    Refills: 0     No discharge procedures on file      ED Provider  Electronically Signed by           Morteza Charles DO  02/09/19 6060

## 2019-02-09 NOTE — DISCHARGE INSTRUCTIONS
Gastroenteritis   WHAT YOU NEED TO KNOW:   Gastroenteritis, or stomach flu, is an infection of the stomach and intestines  DISCHARGE INSTRUCTIONS:   Call 911 for any of the following:   · You have trouble breathing or a very fast pulse  Seek care immediately if:   · You see blood in your diarrhea  · You cannot stop vomiting  · You have not urinated for 12 hours  · You feel like you are going to faint  Contact your healthcare provider if:   · You have a fever  · You continue to vomit or have diarrhea, even after treatment  · You see worms in your diarrhea  · Your mouth or eyes are dry  You are not urinating as much or as often  · You have questions or concerns about your condition or care  Medicines:   · Medicines  may be given to stop vomiting or diarrhea, decrease abdominal cramps, or treat an infection  · Take your medicine as directed  Contact your healthcare provider if you think your medicine is not helping or if you have side effects  Tell him or her if you are allergic to any medicine  Keep a list of the medicines, vitamins, and herbs you take  Include the amounts, and when and why you take them  Bring the list or the pill bottles to follow-up visits  Carry your medicine list with you in case of an emergency  Manage your symptoms:   · Drink liquids as directed  Ask your healthcare provider how much liquid to drink each day, and which liquids are best for you  You may also need to drink an oral rehydration solution (ORS)  An ORS has the right amounts of sugar, salt, and minerals in water to replace body fluids  · Eat bland foods  When you feel hungry, begin eating soft, bland foods  Examples are bananas, clear soup, potatoes, and applesauce  Do not have dairy products, alcohol, sugary drinks, or drinks with caffeine until you feel better  · Rest as much as possible  Slowly start to do more each day when you begin to feel better    Prevent the spread of gastroenteritis:  Gastroenteritis can spread easily  Keep yourself, your family, and your surroundings clean to help prevent the spread of gastroenteritis:  · Wash your hands often  Use soap and water  Wash your hands after you use the bathroom, change a child's diapers, or sneeze  Wash your hands before you prepare or eat food  · Clean surfaces and do laundry often  Wash your clothes and towels separately from the rest of the laundry  Clean surfaces in your home with antibacterial  or bleach  · Clean food thoroughly and cook safely  Wash raw vegetables before you cook  Cook meat, fish, and eggs fully  Do not use the same dishes for raw meat as you do for other foods  Refrigerate any leftover food immediately  · Be aware when you camp or travel  Drink only clean water  Do not drink from rivers or lakes unless you purify or boil the water first  When you travel, drink bottled water and do not add ice  Do not eat fruit that has not been peeled  Do not eat raw fish or meat that is not fully cooked  Follow up with your healthcare provider as directed:  Write down your questions so you remember to ask them during your visits  © 2017 [employer] Information is for End User's use only and may not be sold, redistributed or otherwise used for commercial purposes  All illustrations and images included in CareNotes® are the copyrighted property of [employer]  or Gaurav Mehta  The above information is an  only  It is not intended as medical advice for individual conditions or treatments  Talk to your doctor, nurse or pharmacist before following any medical regimen to see if it is safe and effective for you

## 2019-02-13 ENCOUNTER — OFFICE VISIT (OUTPATIENT)
Dept: INTERNAL MEDICINE CLINIC | Facility: CLINIC | Age: 59
End: 2019-02-13
Payer: COMMERCIAL

## 2019-02-13 VITALS
BODY MASS INDEX: 37.91 KG/M2 | HEIGHT: 73 IN | OXYGEN SATURATION: 97 % | HEART RATE: 98 BPM | TEMPERATURE: 98.6 F | WEIGHT: 286 LBS | RESPIRATION RATE: 18 BRPM | SYSTOLIC BLOOD PRESSURE: 162 MMHG | DIASTOLIC BLOOD PRESSURE: 102 MMHG

## 2019-02-13 DIAGNOSIS — K52.9 GASTROENTERITIS: Primary | ICD-10-CM

## 2019-02-13 PROBLEM — J01.40 ACUTE NON-RECURRENT PANSINUSITIS: Status: RESOLVED | Noted: 2018-10-10 | Resolved: 2019-02-13

## 2019-02-13 PROCEDURE — 99213 OFFICE O/P EST LOW 20 MIN: CPT | Performed by: PHYSICIAN ASSISTANT

## 2019-02-13 PROCEDURE — 3008F BODY MASS INDEX DOCD: CPT | Performed by: PHYSICIAN ASSISTANT

## 2019-02-13 NOTE — PROGRESS NOTES
Assessment/Plan:   Patient instructed to continue hydration with clear liquids for next 24 hours  If diarrhea slows down then to start brat diet and progress diet slowly as tolerated  Encouraged to take his blood pressure medication  BMI Counseling: Body mass index is 37 73 kg/m²  Discussed the patient's BMI with him  The BMI is above average  BMI counseling and education was provided to the patient  Nutrition recommendations include reducing portion sizes and decreasing overall calorie intake  Exercise recommendations include exercising 3-5 times per week  Return if symptoms worsen or fail to improve, for Next scheduled follow up  Diagnoses and all orders for this visit:    Gastroenteritis          Subjective:      Patient ID: Meenu Guerra is a 62 y o  male  Acute visit    Patient's entire family has developed nausea vomiting and diarrhea  Patient went to the emergency room on 02/09/2019 due to persistence of the symptoms and volume depletion  There he was hydrated, discharge told to continue oral hydration and follow-up in the office  Patient states his diarrhea has slowed down but continues  He continues on clear liquid diet  Complains of some flank pain on the left side which has been chronic and unchanged  Patient states he is urinating normally without difficulty  No further vomiting  ER labs and imaging study reports have been reviewed        ALLERGIES:  Allergies   Allergen Reactions    Ceclor [Cefaclor] Hives       CURRENT MEDICATIONS:    Current Outpatient Medications:     amLODIPine (NORVASC) 5 mg tablet, Take 1 tablet (5 mg total) by mouth daily, Disp: 30 tablet, Rfl: 5    dicyclomine (BENTYL) 20 mg tablet, Take 1 tablet (20 mg total) by mouth every 6 (six) hours as needed (abdominal pain or diarrhea), Disp: 12 tablet, Rfl: 0    lisinopril (ZESTRIL) 20 mg tablet, TAKE 1 TABLET BY MOUTH EVERY DAY, Disp: 90 tablet, Rfl: 1    ondansetron (ZOFRAN-ODT) 4 mg disintegrating tablet, Take 1 tablet (4 mg total) by mouth every 8 (eight) hours as needed for nausea or vomiting, Disp: 20 tablet, Rfl: 0    ACTIVE PROBLEM LIST:  Patient Active Problem List   Diagnosis    Recurrent umbilical hernia    Generalized abdominal pain    Post-op pain    Hyperlipidemia    Hypertension    Obesity    Abdominal wall pain    Chronic pain syndrome    Abdominal pain, generalized    Polyuria    Diarrhea    Strain of rectus abdominis muscle    Impaired fasting glucose    Renal cyst, left       PAST MEDICAL HISTORY:  Past Medical History:   Diagnosis Date    Hypertension     Hypertension     Kidney stone     Recurrent umbilical hernia with incarceration     resolved 12/11/2017       PAST SURGICAL HISTORY:  Past Surgical History:   Procedure Laterality Date    CYSTOSCOPY      HERNIA REPAIR      x2    HIP DEBRIDEMENT Right     incision and drainage of skin of abcess hip    LAPAROSCOPY N/A 3/14/2018    Procedure: DIAGNOSTIC LAPAROSCOPY; LYSIS OF ADHESIONS;  Surgeon: Laurinda Lundborg, MD;  [facility];  Service: General    NERVE BLOCK Bilateral 7/31/2018    Procedure: SPLANCHNIC NERVE BLOCK;  Surgeon: Magdaleno Alarcon MD;  [facility];  Service: Pain Management     [place] LAP, VENTRAL HERNIA REPAIR,REDUCIBLE N/A 12/6/2017    Procedure: LAPAROSCOPIC UMBILICAL HERNIA REPAIR WITH MESH;  Surgeon: Laurinda Lundborg, MD;  [facility];  Service: General       FAMILY HISTORY:  Family History   Problem Relation Age of Onset    Hypertension Father     Cancer Father         throat        SOCIAL HISTORY:  Social History     Socioeconomic History    Marital status: /Civil Union     Spouse name: Not on file    Number of children: Not on file    Years of education: Not on file    Highest education level: Not on file   Occupational History    Not on file   Social Needs    Financial resource strain: Not on file    Food insecurity:     Worry: Not on file     Inability: Not on file   [employer] needs:     Medical: Not on file     Non-medical: Not on file   Tobacco Use    Smoking status: Current Every Day Smoker     Packs/day: 0 20     Years: 30 00     Pack years: 6 00     Types: Cigarettes    Smokeless tobacco: Never Used   Substance and Sexual Activity    Alcohol use: Yes     Comment: rarely    Drug use: No    Sexual activity: Yes     Partners: Female   Lifestyle    Physical activity:     Days per week: Not on file     Minutes per session: Not on file    Stress: Not on file   Relationships    Social connections:     Talks on phone: Not on file     Gets together: Not on file     Attends Cheondo[employer] service: Not on file     Active member of club or organization: Not on file     Attends meetings of clubs or organizations: Not on file     Relationship status: Not on file    Intimate partner violence:     Fear of current or ex partner: Not on file     Emotionally abused: Not on file     Physically abused: Not on file     Forced sexual activity: Not on file   Other Topics Concern    Not on file   Social History Narrative    Not on file       Review of Systems   Constitutional: Negative for activity change, chills, fatigue and fever  HENT: Negative for congestion  Eyes: Negative for discharge  Respiratory: Negative for cough, chest tightness and shortness of breath  Cardiovascular: Negative for chest pain, palpitations and leg swelling  Gastrointestinal: Positive for abdominal pain, diarrhea, nausea and vomiting  Negative for blood in stool  Genitourinary: Negative for difficulty urinating  Musculoskeletal: Negative for arthralgias and myalgias  Skin: Negative for rash  Allergic/Immunologic: Negative for immunocompromised state  Neurological: Negative for dizziness, syncope, weakness, light-headedness and headaches  Hematological: Negative for adenopathy  Does not bruise/bleed easily  Psychiatric/Behavioral: Negative for dysphoric mood   The patient is not nervous/anxious  Objective:  Vitals:    02/13/19 1415   BP: (!) 162/102   Pulse: 98   Resp: 18   Temp: 98 6 °F (37 °C)   TempSrc: Oral   SpO2: 97%   Weight: 130 kg (286 lb)   Height: 6' 1" (1 854 m)     Body mass index is 37 73 kg/m²  Physical Exam   Constitutional: He is oriented to person, place, and time  He appears well-developed and well-nourished  No distress  HENT:   Moist mucous membranes   Neck: Neck supple  No JVD present  Cardiovascular: Normal rate, regular rhythm and normal heart sounds  Pulmonary/Chest: Effort normal and breath sounds normal    Abdominal: Soft  Bowel sounds are normal  There is no tenderness  Musculoskeletal: He exhibits no edema  Neurological: He is alert and oriented to person, place, and time  Skin: Skin is warm and dry  No rash noted  Psychiatric: He has a normal mood and affect  His behavior is normal    Nursing note and vitals reviewed          RESULTS:    Recent Results (from the past 1008 hour(s))   Comprehensive metabolic panel    Collection Time: 02/09/19  1:02 PM   Result Value Ref Range    Sodium 139 136 - 145 mmol/L    Potassium 3 9 3 5 - 5 3 mmol/L    Chloride 104 100 - 108 mmol/L    CO2 28 21 - 32 mmol/L    ANION GAP 7 4 - 13 mmol/L    BUN 15 5 - 25 mg/dL    Creatinine 1 12 0 60 - 1 30 mg/dL    Glucose 135 65 - 140 mg/dL    Calcium 8 6 8 3 - 10 1 mg/dL    AST 19 5 - 45 U/L    ALT 28 12 - 78 U/L    Alkaline Phosphatase 78 46 - 116 U/L    Total Protein 7 7 6 4 - 8 2 g/dL    Albumin 3 8 3 5 - 5 0 g/dL    Total Bilirubin 0 50 0 20 - 1 00 mg/dL    eGFR 72 ml/min/1 73sq m   Lipase    Collection Time: 02/09/19  1:02 PM   Result Value Ref Range    Lipase 153 73 - 393 u/L   CBC and differential    Collection Time: 02/09/19  1:03 PM   Result Value Ref Range    WBC 11 53 (H) 4 31 - 10 16 Thousand/uL    RBC 5 67 (H) 3 88 - 5 62 Million/uL    Hemoglobin 17 2 (H) 12 0 - 17 0 g/dL    Hematocrit 50 9 (H) 36 5 - 49 3 %    MCV 90 82 - 98 fL    MCH 30 3 26 8 - 34 3 pg    MCHC 33 8 31 4 - 37 4 g/dL    RDW 13 2 11 6 - 15 1 %    MPV 9 5 8 9 - 12 7 fL    Platelets 677 890 - 089 Thousands/uL    nRBC 0 /100 WBCs    Neutrophils Relative 89 (H) 43 - 75 %    Immat GRANS % 1 0 - 2 %    Lymphocytes Relative 3 (L) 14 - 44 %    Monocytes Relative 5 4 - 12 %    Eosinophils Relative 2 0 - 6 %    Basophils Relative 0 0 - 1 %    Neutrophils Absolute 10 33 (H) 1 85 - 7 62 Thousands/µL    Immature Grans Absolute 0 06 0 00 - 0 20 Thousand/uL    Lymphocytes Absolute 0 31 (L) 0 60 - 4 47 Thousands/µL    Monocytes Absolute 0 52 0 17 - 1 22 Thousand/µL    Eosinophils Absolute 0 26 0 00 - 0 61 Thousand/µL    Basophils Absolute 0 05 0 00 - 0 10 Thousands/µL   UA w Reflex to Microscopic    Collection Time: 02/09/19  1:09 PM   Result Value Ref Range    Color, UA Yellow     Clarity, UA Clear     Specific Gravity, UA 1 025 1 003 - 1 030    pH, UA 6 0 4 5 - 8 0    Leukocytes, UA Negative Negative    Nitrite, UA Negative Negative    Protein, UA Negative Negative mg/dl    Glucose, UA Negative Negative mg/dl    Ketones, UA Negative Negative mg/dl    Urobilinogen, UA 0 2 0 2, 1 0 E U /dl E U /dl    Bilirubin, UA Negative Negative    Blood, UA Negative Negative       This note was created with voice recognition software  Phonic, grammatical and spelling errors may be present within the note as a result

## 2019-02-13 NOTE — LETTER
February 13, 2019     Patient: Virgie Wilson   YOB: 1960   Date of Visit: 2/13/2019       To Whom it May Concern:    Paxton Steven is under my professional care  He was seen in my office on 2/13/2019  He may return to work on 2/18/19  If you have any questions or concerns, please don't hesitate to call           Sincerely,          Stephanie Duffy PA-C        CC: No Recipients

## 2019-02-21 ENCOUNTER — TELEPHONE (OUTPATIENT)
Dept: INTERNAL MEDICINE CLINIC | Facility: CLINIC | Age: 59
End: 2019-02-21

## 2019-02-21 DIAGNOSIS — J01.40 ACUTE NON-RECURRENT PANSINUSITIS: ICD-10-CM

## 2019-02-21 DIAGNOSIS — J20.9 ACUTE BRONCHITIS, UNSPECIFIED ORGANISM: Primary | ICD-10-CM

## 2019-02-21 RX ORDER — SULFAMETHOXAZOLE AND TRIMETHOPRIM 800; 160 MG/1; MG/1
1 TABLET ORAL EVERY 12 HOURS SCHEDULED
Qty: 20 TABLET | Refills: 0 | Status: SHIPPED | OUTPATIENT
Start: 2019-02-21 | End: 2019-03-03

## 2019-02-21 NOTE — TELEPHONE ENCOUNTER
Sent in antibiotic to his pharmacy  Make sure he takes a probiotic daily while he is on the antibiotic

## 2019-02-21 NOTE — TELEPHONE ENCOUNTER
Patient was in last week but now feeling very bad  Patient is congested, coughing, and low grade fever  Patient wants to know if you could call him something in or does he have to come in? Please advise

## 2019-02-25 ENCOUNTER — HOSPITAL ENCOUNTER (EMERGENCY)
Facility: HOSPITAL | Age: 59
Discharge: HOME | End: 2019-02-25
Attending: EMERGENCY MEDICINE | Admitting: EMERGENCY MEDICINE
Payer: COMMERCIAL

## 2019-02-25 VITALS
RESPIRATION RATE: 18 BRPM | TEMPERATURE: 97.6 F | SYSTOLIC BLOOD PRESSURE: 146 MMHG | WEIGHT: 285.72 LBS | BODY MASS INDEX: 38.7 KG/M2 | DIASTOLIC BLOOD PRESSURE: 91 MMHG | HEIGHT: 72 IN | HEART RATE: 87 BPM | OXYGEN SATURATION: 94 %

## 2019-02-25 DIAGNOSIS — K52.9 GASTROENTERITIS: Primary | ICD-10-CM

## 2019-02-25 PROCEDURE — 99283 EMERGENCY DEPT VISIT LOW MDM: CPT

## 2019-02-25 RX ORDER — ONDANSETRON 4 MG/1
4 TABLET, ORALLY DISINTEGRATING ORAL EVERY 8 HOURS PRN
Qty: 10 TABLET | Refills: 0 | Status: SHIPPED | OUTPATIENT
Start: 2019-02-25 | End: 2019-08-23 | Stop reason: ALTCHOICE

## 2019-02-25 RX ORDER — DICYCLOMINE HCL 20 MG
20 TABLET ORAL ONCE
Status: COMPLETED | OUTPATIENT
Start: 2019-02-25 | End: 2019-02-25

## 2019-02-25 RX ORDER — DICYCLOMINE HCL 20 MG
20 TABLET ORAL 2 TIMES DAILY
Qty: 10 TABLET | Refills: 0 | Status: SHIPPED | OUTPATIENT
Start: 2019-02-25 | End: 2019-06-07

## 2019-02-25 RX ORDER — ONDANSETRON 4 MG/1
4 TABLET, ORALLY DISINTEGRATING ORAL ONCE
Status: COMPLETED | OUTPATIENT
Start: 2019-02-25 | End: 2019-02-25

## 2019-02-25 RX ADMIN — DICYCLOMINE HYDROCHLORIDE 20 MG: 20 TABLET ORAL at 04:54

## 2019-02-25 RX ADMIN — ONDANSETRON 4 MG: 4 TABLET, ORALLY DISINTEGRATING ORAL at 04:54

## 2019-02-25 NOTE — ED PROVIDER NOTES
History  Chief Complaint   Patient presents with    Vomiting     Pt reports vomiting, diarrhea, cough, run down feeling  Pt states that he was seen 1 week ago for the same  Pt reports multiple family members with the same thing  26-year-old male presenting for evaluation of nausea, vomiting, diarrhea  Symptoms started about 2 weeks ago, he was seen in the ED at that time  He had blood work done, CT scan and was found to have gastroenteritis, likely viral   He was discharged home with Zofran and Bentyl  He reports that he followed up with his PCP who started him on Bactrim  He reports that his symptoms have overall improved greatly, however have not completely resolved  He reports that the last episode of vomiting was 2 days ago and last episode of loose stools was yesterday  He reports that he has been able to eat and drink normally  He reports that he had [place] food for dinner last night  He reports that he has some abdominal cramping/general discomfort, but no focal area of pain and that this is much improved as well  Many family members in his house with similar symptoms  No recent travel  No recent antibiotics prior to onset of the symptoms  Denies any fevers, chills, CP, SOB, cough, urinary complaints  History of HTN, kidney stones, umbilical hernia S/P repair with mesh and then surgery for removal of the mesh  He reports that he comes today because he needs a work note to excuse him for today, he reports that he is off the next few days and that he will be going on vacation  A/P:  27-year-old male with viral gastroenteritis, improving, benign/reassuring physical exam, tolerating p o  He requests Zofran/Bentyl and will refill these, patient agreeable to no blood work and symptomatic management, return precautions, PCP follow-up         Prior to Admission Medications   Prescriptions Last Dose Informant Patient Reported? Taking?    amLODIPine (NORVASC) 5 mg tablet   No No   Sig: Take 1 tablet (5 mg total) by mouth daily   dicyclomine (BENTYL) 20 mg tablet   No No   Sig: Take 1 tablet (20 mg total) by mouth every 6 (six) hours as needed (abdominal pain or diarrhea)   lisinopril (ZESTRIL) 20 mg tablet   No No   Sig: TAKE 1 TABLET BY MOUTH EVERY DAY   ondansetron (ZOFRAN-ODT) 4 mg disintegrating tablet   No No   Sig: Take 1 tablet (4 mg total) by mouth every 8 (eight) hours as needed for nausea or vomiting   sulfamethoxazole-trimethoprim (BACTRIM DS) 800-160 mg per tablet   No No   Sig: Take 1 tablet by mouth every 12 (twelve) hours for 10 days      Facility-Administered Medications: None       Past Medical History:   Diagnosis Date    Hypertension     Hypertension     Kidney stone     Recurrent umbilical hernia with incarceration     resolved 12/11/2017       Past Surgical History:   Procedure Laterality Date    CYSTOSCOPY      HERNIA REPAIR      x2    HIP DEBRIDEMENT Right     incision and drainage of skin of abcess hip    LAPAROSCOPY N/A 3/14/2018    Procedure: DIAGNOSTIC LAPAROSCOPY; LYSIS OF ADHESIONS;  Surgeon: Donald West MD;  [facility];  Service: General    NERVE BLOCK Bilateral 7/31/2018    Procedure: SPLANCHNIC NERVE BLOCK;  Surgeon: Fidel Junior MD;  [facility];  Service: Pain Management     [place] LAP, VENTRAL HERNIA REPAIR,REDUCIBLE N/A 12/6/2017    Procedure: LAPAROSCOPIC UMBILICAL HERNIA REPAIR WITH MESH;  Surgeon: Donald West MD;  [facility];  Service: General       Family History   Problem Relation Age of Onset    Hypertension Father     Cancer Father         throat      I have reviewed and agree with the history as documented      Social History     Tobacco Use    Smoking status: Current Every Day Smoker     Packs/day: 0 20     Years: 30 00     Pack years: 6 00     Types: Cigarettes    Smokeless tobacco: Never Used   Substance Use Topics    Alcohol use: Yes     Comment: rarely    Drug use: No        Review of Systems Constitutional: Negative for chills and fever  HENT: Negative for rhinorrhea and sore throat  Respiratory: Negative for cough and shortness of breath  Cardiovascular: Negative for chest pain and leg swelling  Gastrointestinal: Positive for diarrhea, nausea and vomiting  Negative for abdominal pain and constipation  Genitourinary: Negative for dysuria, frequency, hematuria and urgency  Musculoskeletal: Negative for back pain, myalgias and neck pain  Skin: Negative for color change and rash  Allergic/Immunologic: Negative for immunocompromised state  Neurological: Negative for dizziness, weakness, light-headedness, numbness and headaches  Hematological: Negative for adenopathy  Does not bruise/bleed easily  All other systems reviewed and are negative  Physical Exam  Physical Exam   Constitutional: He is oriented to person, place, and time  He appears well-developed and well-nourished  HENT:   Head: Normocephalic and atraumatic  Nose: Nose normal    Mouth/Throat: Oropharynx is clear and moist    Eyes: Conjunctivae and EOM are normal    Neck: Normal range of motion  Neck supple  Cardiovascular: Normal rate, regular rhythm and normal heart sounds  Pulmonary/Chest: Effort normal and breath sounds normal  He exhibits no tenderness  Abdominal: Soft  He exhibits no distension  There is no tenderness  There is no rebound and no guarding  No back/CVA tenderness   Musculoskeletal: He exhibits no edema or deformity  Neurological: He is alert and oriented to person, place, and time  He exhibits normal muscle tone  Coordination normal    Skin: Skin is warm and dry  No rash noted  Psychiatric: He has a normal mood and affect  Thought content normal    Nursing note and vitals reviewed        Vital Signs  ED Triage Vitals [02/25/19 0438]   Temperature Pulse Respirations Blood Pressure SpO2   97 6 °F (36 4 °C) 87 18 146/91 94 %      Temp src Heart Rate Source Patient Position - Orthostatic VS BP Location FiO2 (%)   -- Monitor Sitting Right arm --      Pain Score       5           Vitals:    02/25/19 0438   BP: 146/91   Pulse: 87   Patient Position - Orthostatic VS: Sitting       Visual Acuity      ED Medications  Medications   ondansetron (ZOFRAN-ODT) dispersible tablet 4 mg (4 mg Oral Given 2/25/19 0454)   dicyclomine (BENTYL) tablet 20 mg (20 mg Oral Given 2/25/19 0454)       Diagnostic Studies  Results Reviewed     None                 No orders to display              Procedures  Procedures       Phone Contacts  ED Phone Contact    ED Course                               MDM  Number of Diagnoses or Management Options  Gastroenteritis:   Diagnosis management comments: 59-year-old male with gastroenteritis, symptoms improving, refilled Zofran/Bentyl, return precautions, PCP follow-up, GI of persistent issues       Amount and/or Complexity of Data Reviewed  Clinical lab tests: reviewed  Tests in the radiology section of CPT®: reviewed  Tests in the medicine section of CPT®: reviewed  Review and summarize past medical records: yes  Independent visualization of images, tracings, or specimens: yes        Disposition  Final diagnoses:   Gastroenteritis     Time reflects when diagnosis was documented in both MDM as applicable and the Disposition within this note     Time User Action Codes Description Comment    2/25/2019  4:48 AM Adrian Pisano Add [K52 9] Gastroenteritis       ED Disposition     ED Disposition Condition Date/Time Comment    Discharge Stable Mon Feb 25, 2019  4:48 AM Geri Porter discharge to home/self care              Follow-up Information     Follow up With Specialties Details Why Contact Info Additional Information    Cristel Garcia MD Internal Medicine  return to ED if any new or worsening symptoms [facility] [phone] [facility] Gastroenterology  If persistent symptoms [id number]  [place] [address] [facility],[address] [id number]  [address] [facility] [place], [place], [place], [address] [facility], [address]          Discharge Medication List as of 2/25/2019  4:55 AM      START taking these medications    Details   !! dicyclomine (BENTYL) 20 mg tablet Take 1 tablet (20 mg total) by mouth 2 (two) times a day for 5 days, Starting Mon 2/25/2019, Until Sat 3/2/2019, Print      !! ondansetron (ZOFRAN-ODT) 4 mg disintegrating tablet Take 1 tablet (4 mg total) by mouth every 8 (eight) hours as needed for nausea or vomiting for up to 12 doses, Starting Mon 2/25/2019, Print       !! - Potential duplicate medications found  Please discuss with provider  CONTINUE these medications which have NOT CHANGED    Details   amLODIPine (NORVASC) 5 mg tablet Take 1 tablet (5 mg total) by mouth daily, Starting Mon 10/29/2018, Normal      !! dicyclomine (BENTYL) 20 mg tablet Take 1 tablet (20 mg total) by mouth every 6 (six) hours as needed (abdominal pain or diarrhea), Starting Sat 2/9/2019, Print      lisinopril (ZESTRIL) 20 mg tablet TAKE 1 TABLET BY MOUTH EVERY DAY, Normal      !! ondansetron (ZOFRAN-ODT) 4 mg disintegrating tablet Take 1 tablet (4 mg total) by mouth every 8 (eight) hours as needed for nausea or vomiting, Starting Sat 2/9/2019, Print      sulfamethoxazole-trimethoprim (BACTRIM DS) 800-160 mg per tablet Take 1 tablet by mouth every 12 (twelve) hours for 10 days, Starting u 2/21/2019, Until Sun 3/3/2019, Normal       !! - Potential duplicate medications found  Please discuss with provider  No discharge procedures on file      ED Provider  Electronically Signed by           Marylou Aponte DO  02/25/19 0209

## 2019-04-24 DIAGNOSIS — I10 ESSENTIAL HYPERTENSION: ICD-10-CM

## 2019-04-24 RX ORDER — AMLODIPINE BESYLATE 5 MG/1
TABLET ORAL
Qty: 30 TABLET | Refills: 5 | Status: SHIPPED | OUTPATIENT
Start: 2019-04-24 | End: 2019-10-13 | Stop reason: SDUPTHER

## 2019-06-07 ENCOUNTER — HOSPITAL ENCOUNTER (EMERGENCY)
Facility: HOSPITAL | Age: 59
Discharge: HOME | End: 2019-06-07
Attending: EMERGENCY MEDICINE | Admitting: EMERGENCY MEDICINE
Payer: COMMERCIAL

## 2019-06-07 VITALS
OXYGEN SATURATION: 96 % | BODY MASS INDEX: 38.18 KG/M2 | WEIGHT: 281.53 LBS | HEART RATE: 74 BPM | RESPIRATION RATE: 18 BRPM | SYSTOLIC BLOOD PRESSURE: 155 MMHG | DIASTOLIC BLOOD PRESSURE: 90 MMHG | TEMPERATURE: 97.5 F

## 2019-06-07 DIAGNOSIS — M54.50 ACUTE EXACERBATION OF CHRONIC LOW BACK PAIN: Primary | ICD-10-CM

## 2019-06-07 DIAGNOSIS — G89.29 ACUTE EXACERBATION OF CHRONIC LOW BACK PAIN: Primary | ICD-10-CM

## 2019-06-07 PROCEDURE — 99283 EMERGENCY DEPT VISIT LOW MDM: CPT

## 2019-06-07 PROCEDURE — 99283 EMERGENCY DEPT VISIT LOW MDM: CPT | Performed by: EMERGENCY MEDICINE

## 2019-06-07 RX ORDER — LIDOCAINE 50 MG/G
1 PATCH TOPICAL ONCE
Status: DISCONTINUED | OUTPATIENT
Start: 2019-06-07 | End: 2019-06-07 | Stop reason: HOSPADM

## 2019-06-07 RX ORDER — HYDROCODONE BITARTRATE AND ACETAMINOPHEN 5; 325 MG/1; MG/1
1 TABLET ORAL EVERY 6 HOURS PRN
Qty: 10 TABLET | Refills: 0 | Status: SHIPPED | OUTPATIENT
Start: 2019-06-07 | End: 2019-06-10

## 2019-06-07 RX ORDER — METHOCARBAMOL 500 MG/1
500 TABLET, FILM COATED ORAL ONCE
Status: COMPLETED | OUTPATIENT
Start: 2019-06-07 | End: 2019-06-07

## 2019-06-07 RX ORDER — HYDROCODONE BITARTRATE AND ACETAMINOPHEN 5; 325 MG/1; MG/1
1 TABLET ORAL ONCE
Status: COMPLETED | OUTPATIENT
Start: 2019-06-07 | End: 2019-06-07

## 2019-06-07 RX ORDER — METHOCARBAMOL 500 MG/1
500 TABLET, FILM COATED ORAL 3 TIMES DAILY PRN
Qty: 20 TABLET | Refills: 0 | Status: SHIPPED | OUTPATIENT
Start: 2019-06-07 | End: 2020-02-19

## 2019-06-07 RX ADMIN — HYDROCODONE BITARTRATE AND ACETAMINOPHEN 1 TABLET: 5; 325 TABLET ORAL at 09:55

## 2019-06-07 RX ADMIN — LIDOCAINE 1 PATCH: 50 PATCH TOPICAL at 09:55

## 2019-06-07 RX ADMIN — METHOCARBAMOL TABLETS 500 MG: 500 TABLET, COATED ORAL at 09:55

## 2019-06-13 ENCOUNTER — TELEPHONE (OUTPATIENT)
Dept: PHYSICAL THERAPY | Facility: OTHER | Age: 59
End: 2019-06-13

## 2019-06-23 DIAGNOSIS — I10 ESSENTIAL HYPERTENSION: ICD-10-CM

## 2019-06-23 RX ORDER — LISINOPRIL 20 MG/1
TABLET ORAL
Qty: 30 TABLET | Refills: 0 | Status: SHIPPED | OUTPATIENT
Start: 2019-06-23 | End: 2019-07-21 | Stop reason: SDUPTHER

## 2019-07-21 DIAGNOSIS — I10 ESSENTIAL HYPERTENSION: ICD-10-CM

## 2019-07-22 RX ORDER — LISINOPRIL 20 MG/1
TABLET ORAL
Qty: 30 TABLET | Refills: 3 | Status: SHIPPED | OUTPATIENT
Start: 2019-07-22 | End: 2019-11-11 | Stop reason: SDUPTHER

## 2019-08-23 ENCOUNTER — DOCUMENTATION (OUTPATIENT)
Dept: INTERNAL MEDICINE CLINIC | Facility: CLINIC | Age: 59
End: 2019-08-23

## 2019-08-23 ENCOUNTER — OFFICE VISIT (OUTPATIENT)
Dept: INTERNAL MEDICINE CLINIC | Facility: CLINIC | Age: 59
End: 2019-08-23
Payer: COMMERCIAL

## 2019-08-23 VITALS
SYSTOLIC BLOOD PRESSURE: 140 MMHG | HEIGHT: 72 IN | BODY MASS INDEX: 34.95 KG/M2 | WEIGHT: 258 LBS | HEART RATE: 60 BPM | RESPIRATION RATE: 16 BRPM | OXYGEN SATURATION: 96 % | DIASTOLIC BLOOD PRESSURE: 96 MMHG

## 2019-08-23 DIAGNOSIS — Z01.818 PRE-OP EXAMINATION: Primary | ICD-10-CM

## 2019-08-23 DIAGNOSIS — I10 ESSENTIAL HYPERTENSION: ICD-10-CM

## 2019-08-23 DIAGNOSIS — M51.26 LUMBAR HERNIATED DISC: ICD-10-CM

## 2019-08-23 PROCEDURE — 99242 OFF/OP CONSLTJ NEW/EST SF 20: CPT | Performed by: INTERNAL MEDICINE

## 2019-08-23 RX ORDER — GABAPENTIN 300 MG/1
CAPSULE ORAL
COMMUNITY
Start: 2019-08-20 | End: 2020-02-19

## 2019-08-23 RX ORDER — HYDROCODONE BITARTRATE AND ACETAMINOPHEN 5; 325 MG/1; MG/1
TABLET ORAL
Refills: 0 | COMMUNITY
Start: 2019-07-01 | End: 2020-02-19

## 2019-08-23 NOTE — PROGRESS NOTES
Assessment/Plan:     Patient is considered cleared for the planned procedure with no further workup indicated  His chest x-ray, EKG, blood work are all within acceptable limits  He will continue his blood pressure medicine at present doses  The slight elevation today is related to his pain which should obviously be relieved by the planned procedure  BMI Counseling: Body mass index is 34 99 kg/m²  Discussed the patient's BMI with him  The BMI is above average  BMI counseling and education was provided to the patient  Nutrition recommendations include moderation in carbohydrate intake  which he has been doing and has lost some weight already  Return if symptoms worsen or fail to improve  No problem-specific Assessment & Plan notes found for this encounter  Diagnoses and all orders for this visit:    Pre-op examination    Lumbar herniated disc    Essential hypertension    Other orders  -     gabapentin (NEURONTIN) 300 mg capsule  -     HYDROcodone-acetaminophen (NORCO) 5-325 mg per tablet; TAKE 1 TABLET 5 TIMES A DAY          Subjective:      Patient ID: Isauro Elaine is a 62 y o  male  Patient comes in today for preop evaluation for lumbar laminectomy and diskectomy secondary to unrelenting pain from a herniated disc at the L5-S1 level  He has had numerous shots  He has gone to physical therapy  Not improving  Both he and his surgeon have decided that surgical repair is indicated at this point  He does have a history of hypertension which had been well controlled until the pain started from this  He takes his medicine as directed  He has been working on his diet and has lost weight  He has no history of heart disease or diabetes  No liver disease  No prior history of trouble with anesthesia        ALLERGIES:  Allergies   Allergen Reactions    Ceclor [Cefaclor] Hives       CURRENT MEDICATIONS:    Current Outpatient Medications:     amLODIPine (NORVASC) 5 mg tablet, TAKE 1 TABLET BY MOUTH EVERY DAY, Disp: 30 tablet, Rfl: 5    gabapentin (NEURONTIN) 300 mg capsule, , Disp: , Rfl:     HYDROcodone-acetaminophen (NORCO) 5-325 mg per tablet, TAKE 1 TABLET 5 TIMES A DAY, Disp: , Rfl: 0    lisinopril (ZESTRIL) 20 mg tablet, TAKE 1 TABLET BY MOUTH EVERY DAY, Disp: 30 tablet, Rfl: 3    methocarbamol (ROBAXIN) 500 mg tablet, Take 1 tablet (500 mg total) by mouth 3 (three) times a day as needed for muscle spasms, Disp: 20 tablet, Rfl: 0    ACTIVE PROBLEM LIST:  Patient Active Problem List   Diagnosis    Recurrent umbilical hernia    Generalized abdominal pain    Post-op pain    Hyperlipidemia    Hypertension    Obesity    Abdominal wall pain    Chronic pain syndrome    Abdominal pain, generalized    Polyuria    Diarrhea    Strain of rectus abdominis muscle    Impaired fasting glucose    Renal cyst, left       PAST MEDICAL HISTORY:  Past Medical History:   Diagnosis Date    Hypertension     Hypertension     Kidney stone     Recurrent umbilical hernia with incarceration     resolved 12/11/2017       PAST SURGICAL HISTORY:  Past Surgical History:   Procedure Laterality Date    CYSTOSCOPY      HERNIA REPAIR      x2    HIP DEBRIDEMENT Right     incision and drainage of skin of abcess hip    LAPAROSCOPY N/A 3/14/2018    Procedure: DIAGNOSTIC LAPAROSCOPY; LYSIS OF ADHESIONS;  Surgeon: Bel Castillo MD;  [facility];  Service: General    NERVE BLOCK Bilateral 7/31/2018    Procedure: SPLANCHNIC NERVE BLOCK;  Surgeon: Dakotah Curry MD;  [facility];  Service: Pain Management     AL LAP, VENTRAL HERNIA REPAIR,REDUCIBLE N/A 12/6/2017    Procedure: LAPAROSCOPIC UMBILICAL HERNIA REPAIR WITH MESH;  Surgeon: Bel Castillo MD;  [facility];  Service: General       FAMILY HISTORY:  Family History   Problem Relation Age of Onset    Hypertension Father     Cancer Father         throat        SOCIAL HISTORY:  Social History     Socioeconomic History    Marital status: /Civil Union     Spouse name: Not on file    Number of children: Not on file    Years of education: Not on file    Highest education level: Not on file   Occupational History    Not on file   Social Needs    Financial resource strain: Not on file    Food insecurity:     Worry: Not on file     Inability: Not on file    Transportation needs:     Medical: Not on file     Non-medical: Not on file   Tobacco Use    Smoking status: Current Every Day Smoker     Packs/day: 0 20     Years: 30 00     Pack years: 6 00     Types: Cigarettes    Smokeless tobacco: Never Used   Substance and Sexual Activity    Alcohol use: Yes     Comment: rarely    Drug use: No    Sexual activity: Yes     Partners: Female   Lifestyle    Physical activity:     Days per week: Not on file     Minutes per session: Not on file    Stress: Not on file   Relationships    Social connections:     Talks on phone: Not on file     Gets together: Not on file     Attends Sabianist service: Not on file     Active member of club or organization: Not on file     Attends meetings of clubs or organizations: Not on file     Relationship status: Not on file    Intimate partner violence:     Fear of current or ex partner: Not on file     Emotionally abused: Not on file     Physically abused: Not on file     Forced sexual activity: Not on file   Other Topics Concern    Not on file   Social History Narrative    Not on file       Review of Systems   Constitutional: Negative for fever  HENT: Negative for congestion  Eyes: Negative for visual disturbance  Respiratory: Negative for shortness of breath  Cardiovascular: Negative for chest pain  Gastrointestinal: Negative for abdominal pain  Genitourinary: Negative for frequency  Musculoskeletal: Positive for back pain  Negative for arthralgias  Skin: Negative for rash  Neurological: Negative for headaches  Psychiatric/Behavioral: Negative for dysphoric mood   The patient is not nervous/anxious  Objective:  Vitals:    08/23/19 1302 08/23/19 1314   BP: 146/100 140/96   BP Location: Right arm    Patient Position: Sitting    Cuff Size: Standard    Pulse: 60    Resp: 16    SpO2: 96%    Weight: 117 kg (258 lb)    Height: 6' (1 829 m)      Body mass index is 34 99 kg/m²  Physical Exam   Constitutional: He is oriented to person, place, and time  He appears well-developed and well-nourished  HENT:   Right Ear: External ear normal    Left Ear: External ear normal    Mouth/Throat: Oropharynx is clear and moist  No oropharyngeal exudate  Eyes: Pupils are equal, round, and reactive to light  Conjunctivae are normal    Neck: Carotid bruit is not present  Cardiovascular: Normal rate, regular rhythm, normal heart sounds and intact distal pulses  Pulmonary/Chest: Effort normal and breath sounds normal  He has no wheezes  He has no rales  Abdominal: Soft  There is no tenderness  Musculoskeletal: He exhibits no edema  Lymphadenopathy:     He has no cervical adenopathy  Neurological: He is alert and oriented to person, place, and time  Skin: No rash noted  Psychiatric: He has a normal mood and affect  Nursing note and vitals reviewed  RESULTS:    No results found for this or any previous visit (from the past 1008 hour(s))  This note was created with voice recognition software  Phonic, grammatical and spelling errors may be present within the note as a result

## 2019-10-13 DIAGNOSIS — I10 ESSENTIAL HYPERTENSION: ICD-10-CM

## 2019-10-13 RX ORDER — AMLODIPINE BESYLATE 5 MG/1
TABLET ORAL
Qty: 30 TABLET | Refills: 1 | Status: SHIPPED | OUTPATIENT
Start: 2019-10-13 | End: 2019-12-08 | Stop reason: SDUPTHER

## 2019-11-11 DIAGNOSIS — I10 ESSENTIAL HYPERTENSION: ICD-10-CM

## 2019-11-11 RX ORDER — LISINOPRIL 20 MG/1
TABLET ORAL
Qty: 30 TABLET | Refills: 3 | Status: SHIPPED | OUTPATIENT
Start: 2019-11-11 | End: 2020-02-17

## 2019-12-08 DIAGNOSIS — I10 ESSENTIAL HYPERTENSION: ICD-10-CM

## 2019-12-08 RX ORDER — AMLODIPINE BESYLATE 5 MG/1
TABLET ORAL
Qty: 30 TABLET | Refills: 1 | Status: SHIPPED | OUTPATIENT
Start: 2019-12-08 | End: 2020-02-06 | Stop reason: ALTCHOICE

## 2020-01-28 DIAGNOSIS — Z20.828 EXPOSURE TO INFLUENZA: Primary | ICD-10-CM

## 2020-01-28 RX ORDER — OSELTAMIVIR PHOSPHATE 75 MG/1
75 CAPSULE ORAL DAILY
Qty: 7 CAPSULE | Refills: 0 | Status: SHIPPED | OUTPATIENT
Start: 2020-01-28 | End: 2020-02-04

## 2020-01-29 ENCOUNTER — TELEPHONE (OUTPATIENT)
Dept: INTERNAL MEDICINE CLINIC | Facility: CLINIC | Age: 60
End: 2020-01-29

## 2020-01-29 NOTE — TELEPHONE ENCOUNTER
Patient called stating daughter was dx with influenza b  Dr Brando Ambriz sent in tamiflu for his wife and he would like it as well  Please send to [facility]

## 2020-02-06 ENCOUNTER — OFFICE VISIT (OUTPATIENT)
Dept: INTERNAL MEDICINE CLINIC | Facility: CLINIC | Age: 60
End: 2020-02-06
Payer: COMMERCIAL

## 2020-02-06 VITALS
RESPIRATION RATE: 18 BRPM | OXYGEN SATURATION: 95 % | HEIGHT: 72 IN | WEIGHT: 265 LBS | HEART RATE: 90 BPM | DIASTOLIC BLOOD PRESSURE: 92 MMHG | SYSTOLIC BLOOD PRESSURE: 156 MMHG | BODY MASS INDEX: 35.89 KG/M2

## 2020-02-06 DIAGNOSIS — R35.0 URINARY FREQUENCY: ICD-10-CM

## 2020-02-06 DIAGNOSIS — R19.4 FREQUENT BOWEL MOVEMENTS: ICD-10-CM

## 2020-02-06 DIAGNOSIS — R73.01 IMPAIRED FASTING GLUCOSE: ICD-10-CM

## 2020-02-06 DIAGNOSIS — R25.2 MUSCLE CRAMPS: ICD-10-CM

## 2020-02-06 DIAGNOSIS — E78.2 MIXED HYPERLIPIDEMIA: ICD-10-CM

## 2020-02-06 DIAGNOSIS — Z11.59 NEED FOR HEPATITIS C SCREENING TEST: ICD-10-CM

## 2020-02-06 DIAGNOSIS — I10 ESSENTIAL HYPERTENSION: ICD-10-CM

## 2020-02-06 DIAGNOSIS — M79.10 MYALGIA: Primary | ICD-10-CM

## 2020-02-06 DIAGNOSIS — R10.9 ABDOMINAL WALL PAIN: ICD-10-CM

## 2020-02-06 DIAGNOSIS — Z11.4 SCREENING FOR HIV (HUMAN IMMUNODEFICIENCY VIRUS): ICD-10-CM

## 2020-02-06 PROCEDURE — 99214 OFFICE O/P EST MOD 30 MIN: CPT | Performed by: PHYSICIAN ASSISTANT

## 2020-02-06 PROCEDURE — 3080F DIAST BP >= 90 MM HG: CPT | Performed by: PHYSICIAN ASSISTANT

## 2020-02-06 PROCEDURE — 3008F BODY MASS INDEX DOCD: CPT | Performed by: PHYSICIAN ASSISTANT

## 2020-02-06 PROCEDURE — 3077F SYST BP >= 140 MM HG: CPT | Performed by: PHYSICIAN ASSISTANT

## 2020-02-06 RX ORDER — AMLODIPINE BESYLATE 10 MG/1
10 TABLET ORAL DAILY
Qty: 90 TABLET | Refills: 1 | Status: SHIPPED | OUTPATIENT
Start: 2020-02-06 | End: 2020-08-07

## 2020-02-06 NOTE — PATIENT INSTRUCTIONS
Will increase amlodipine from 5 mg to 10 mg daily  This for better blood pressure control  Due to recurrent abdominal pain will refer back to surgery for their input  Will have patient check screening labs and other labs due to symptoms of frequent bowel movements and urination along with muscle cramping  Schedule follow-up here in 3 weeks, sooner as needed

## 2020-02-06 NOTE — PROGRESS NOTES
Assessment/Plan:      Patient Instructions   Will increase amlodipine from 5 mg to 10 mg daily  This for better blood pressure control  Due to recurrent abdominal pain will refer back to surgery for their input  Will have patient check screening labs and other labs due to symptoms of frequent bowel movements and urination along with muscle cramping  Schedule follow-up here in 3 weeks, sooner as needed  Quality Measures: BMI Counseling: Body mass index is 35 94 kg/m²  The BMI is above normal  Nutrition recommendations include decreasing portion sizes, encouraging healthy choices of fruits and vegetables and moderation in carbohydrate intake  Exercise recommendations include exercising 3-5 times per week  Return in about 3 weeks (around 2/27/2020) for Next scheduled follow up  Diagnoses and all orders for this visit:    Myalgia  -     Vitamin D 25 hydroxy; Future  -     Magnesium; Future    Muscle cramps  -     T4, free; Future  -     TSH, 3rd generation; Future  -     Vitamin D 25 hydroxy; Future  -     Magnesium; Future    Impaired fasting glucose  -     Hemoglobin A1C; Future    Essential hypertension  -     CBC and differential; Future  -     Comprehensive metabolic panel; Future  -     amLODIPine (NORVASC) 10 mg tablet; Take 1 tablet (10 mg total) by mouth daily    Abdominal wall pain  -     Ambulatory referral to General Surgery; Future    Mixed hyperlipidemia  -     Lipid panel; Future    Frequent bowel movements  -     T4, free; Future  -     TSH, 3rd generation; Future    Urinary frequency  -     T4, free; Future  -     TSH, 3rd generation; Future    Screening for HIV (human immunodeficiency virus)  -     HIV 1/2 AG-AB combo; Future    Need for hepatitis C screening test  -     Hepatitis C antibody; Future    Other orders  -     Cancel: Cologuard; Future          Subjective:      Patient ID: Kaylee Gallegos is a 61 y o  male      Acute visit/follow-up    Patient has not been seen since August when he was in for preop exam   Prior to that he had not been seen for 1 year  He has significant medical problems that he has not been series about participating in controlling  He does inform me that he had 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  When he was released to work on [employer], he was able to work almost 1 shift when he started with severe leg pain that was diagnosed as trochanteric bursitis which has had him out of work since  He is currently seen Orthopedics and Physical therapy for this issue  Hypertension:  Not controlled  Patient complains of a fuzzy feeling in his head, almost like a lightheaded sensation but not that severe  No complaint of chest pain, palpitations, shortness of breath, leg swelling, visual disturbance or headache  Patient complaining of recurrent muscle aching, muscle cramping, muscle twitching  This can occur anytime of the day or night  Patient also complaining of having frequent bowel movements during the day  He states they are formed, no evidence of blood, but multiple times throughout the day  He is also complaining of frequent urination  No dysuria, urgency  He has had previous elevated blood sugars which she is chose to ignore  Denies polydipsia or polyphagia  Recurrent mid abdominal pain  He insists that this is similar pain to what he had been having postop from his previous hernia surgery  He has concern that it may be related to the mesh that was inserted and that he may have recalled mesh within his body  HIV screening discussed  Patient in agreement  Hepatitis C screening discussed  Patient in agreement        ALLERGIES:  Allergies   Allergen Reactions    Ceclor [Cefaclor] Hives       CURRENT MEDICATIONS:    Current Outpatient Medications:     lisinopril (ZESTRIL) 20 mg tablet, TAKE 1 TABLET BY MOUTH EVERY DAY, Disp: 30 tablet, Rfl: 3    amLODIPine (NORVASC) 10 mg tablet, Take 1 tablet (10 mg total) by mouth daily, Disp: 90 tablet, Rfl: 1    gabapentin (NEURONTIN) 300 mg capsule, , Disp: , Rfl:     HYDROcodone-acetaminophen (NORCO) 5-325 mg per tablet, TAKE 1 TABLET 5 TIMES A DAY, Disp: , Rfl: 0    methocarbamol (ROBAXIN) 500 mg tablet, Take 1 tablet (500 mg total) by mouth 3 (three) times a day as needed for muscle spasms (Patient not taking: Reported on 2/6/2020), Disp: 20 tablet, Rfl: 0    ACTIVE PROBLEM LIST:  Patient Active Problem List   Diagnosis    Recurrent umbilical hernia    Generalized abdominal pain    Post-op pain    Hyperlipidemia    Hypertension    Obesity    Abdominal wall pain    Chronic pain syndrome    Abdominal pain, generalized    Polyuria    Diarrhea    Strain of rectus abdominis muscle    Impaired fasting glucose    Renal cyst, left       PAST MEDICAL HISTORY:  Past Medical History:   Diagnosis Date    Hypertension     Hypertension     Kidney stone     Recurrent umbilical hernia with incarceration     resolved 12/11/2017       PAST SURGICAL HISTORY:  Past Surgical History:   Procedure Laterality Date    CYSTOSCOPY      HERNIA REPAIR      x2    HIP DEBRIDEMENT Right     incision and drainage of skin of abcess hip    LAPAROSCOPY N/A 3/14/2018    Procedure: DIAGNOSTIC LAPAROSCOPY; LYSIS OF ADHESIONS;  Surgeon: Bhanu Baker MD;  [facility];  Service: General    NERVE BLOCK Bilateral 7/31/2018    Procedure: SPLANCHNIC NERVE BLOCK;  Surgeon: Balbina Naranjo MD;  [facility];  Service: Pain Management     NE LAP, VENTRAL HERNIA REPAIR,REDUCIBLE N/A 12/6/2017    Procedure: LAPAROSCOPIC UMBILICAL HERNIA REPAIR WITH MESH;  Surgeon: Bhanu Baker MD;  [facility];  Service: General       FAMILY HISTORY:  Family History   Problem Relation Age of Onset    Hypertension Father     Cancer Father         throat        SOCIAL HISTORY:  Social History     Socioeconomic History    Marital status: /Civil Union     Spouse name: Not on file  Number of children: Not on file    Years of education: Not on file    Highest education level: Not on file   Occupational History    Not on file   Social Needs    Financial resource strain: Not on file    Food insecurity:     Worry: Not on file     Inability: Not on file    Transportation needs:     Medical: Not on file     Non-medical: Not on file   Tobacco Use    Smoking status: Current Every Day Smoker     Packs/day: 0 20     Years: 30 00     Pack years: 6 00     Types: Cigarettes    Smokeless tobacco: Never Used   Substance and Sexual Activity    Alcohol use: Yes     Comment: rarely    Drug use: No    Sexual activity: Yes     Partners: Female   Lifestyle    Physical activity:     Days per week: Not on file     Minutes per session: Not on file    Stress: Not on file   Relationships    Social connections:     Talks on phone: Not on file     Gets together: Not on file     Attends Jewish service: Not on file     Active member of club or organization: Not on file     Attends meetings of clubs or organizations: Not on file     Relationship status: Not on file    Intimate partner violence:     Fear of current or ex partner: Not on file     Emotionally abused: Not on file     Physically abused: Not on file     Forced sexual activity: Not on file   Other Topics Concern    Not on file   Social History Narrative    Not on file       Review of Systems   Constitutional: Negative for activity change, chills, fatigue and fever  HENT: Negative for congestion  Eyes: Negative for discharge  Respiratory: Negative for cough, chest tightness and shortness of breath  Cardiovascular: Negative for chest pain, palpitations and leg swelling  Gastrointestinal: Negative for abdominal pain  See HPI   Endocrine: Positive for polyuria  Negative for polydipsia and polyphagia  Genitourinary: Negative for difficulty urinating  Musculoskeletal: Positive for myalgias and neck pain   Negative for arthralgias  Skin: Negative for rash  Allergic/Immunologic: Negative for immunocompromised state  Neurological: Negative for dizziness, syncope, weakness, light-headedness and headaches  Hematological: Negative for adenopathy  Does not bruise/bleed easily  Psychiatric/Behavioral: Negative for dysphoric mood and sleep disturbance  The patient is not nervous/anxious  Objective:  Vitals:    02/06/20 1309 02/06/20 1331   BP: 168/98 156/92   BP Location: Left arm Left arm   Patient Position: Sitting Sitting   Cuff Size: Large    Pulse: 90    Resp: 18    SpO2: 95%    Weight: 120 kg (265 lb)    Height: 6' (1 829 m)      Body mass index is 35 94 kg/m²  Physical Exam   Constitutional: He is oriented to person, place, and time  He appears well-developed and well-nourished  No distress  Obese   HENT:   Head: Normocephalic  Neck: Neck supple  Cardiovascular: Normal rate, regular rhythm and normal heart sounds  Pulmonary/Chest: Effort normal and breath sounds normal    Abdominal: Soft  Bowel sounds are normal  He exhibits no mass  There is tenderness (Tenderness in the mid abdomen region to only mild to moderate palpation)  There is guarding  There is no rebound  Musculoskeletal: He exhibits no edema  Lymphadenopathy:     He has no cervical adenopathy  Neurological: He is alert and oriented to person, place, and time  Skin: Skin is warm and dry  No rash noted  Psychiatric: He has a normal mood and affect  His behavior is normal    Nursing note and vitals reviewed  RESULTS:    No results found for this or any previous visit (from the past 1008 hour(s))  This note was created with voice recognition software  Phonic, grammatical and spelling errors may be present within the note as a result

## 2020-02-13 ENCOUNTER — APPOINTMENT (OUTPATIENT)
Dept: LAB | Facility: HOSPITAL | Age: 60
End: 2020-02-13
Payer: COMMERCIAL

## 2020-02-13 DIAGNOSIS — R73.01 IMPAIRED FASTING GLUCOSE: ICD-10-CM

## 2020-02-13 DIAGNOSIS — R35.0 URINARY FREQUENCY: ICD-10-CM

## 2020-02-13 DIAGNOSIS — E78.2 MIXED HYPERLIPIDEMIA: ICD-10-CM

## 2020-02-13 DIAGNOSIS — R25.2 MUSCLE CRAMPS: ICD-10-CM

## 2020-02-13 DIAGNOSIS — I10 ESSENTIAL HYPERTENSION: ICD-10-CM

## 2020-02-13 DIAGNOSIS — Z11.4 SCREENING FOR HIV (HUMAN IMMUNODEFICIENCY VIRUS): ICD-10-CM

## 2020-02-13 DIAGNOSIS — R19.4 FREQUENT BOWEL MOVEMENTS: ICD-10-CM

## 2020-02-13 DIAGNOSIS — M79.10 MYALGIA: ICD-10-CM

## 2020-02-13 DIAGNOSIS — Z11.59 NEED FOR HEPATITIS C SCREENING TEST: ICD-10-CM

## 2020-02-13 LAB
25(OH)D3 SERPL-MCNC: 16.9 NG/ML (ref 30–100)
ALBUMIN SERPL BCP-MCNC: 3.6 G/DL (ref 3.5–5)
ALP SERPL-CCNC: 65 U/L (ref 46–116)
ALT SERPL W P-5'-P-CCNC: 20 U/L (ref 12–78)
ANION GAP SERPL CALCULATED.3IONS-SCNC: 4 MMOL/L (ref 4–13)
AST SERPL W P-5'-P-CCNC: 16 U/L (ref 5–45)
BASOPHILS # BLD AUTO: 0.05 THOUSANDS/ΜL (ref 0–0.1)
BASOPHILS NFR BLD AUTO: 1 % (ref 0–1)
BILIRUB SERPL-MCNC: 0.5 MG/DL (ref 0.2–1)
BUN SERPL-MCNC: 15 MG/DL (ref 5–25)
CALCIUM SERPL-MCNC: 8.8 MG/DL (ref 8.3–10.1)
CHLORIDE SERPL-SCNC: 107 MMOL/L (ref 100–108)
CHOLEST SERPL-MCNC: 178 MG/DL (ref 50–200)
CO2 SERPL-SCNC: 31 MMOL/L (ref 21–32)
CREAT SERPL-MCNC: 1.03 MG/DL (ref 0.6–1.3)
EOSINOPHIL # BLD AUTO: 0.26 THOUSAND/ΜL (ref 0–0.61)
EOSINOPHIL NFR BLD AUTO: 4 % (ref 0–6)
ERYTHROCYTE [DISTWIDTH] IN BLOOD BY AUTOMATED COUNT: 13.6 % (ref 11.6–15.1)
EST. AVERAGE GLUCOSE BLD GHB EST-MCNC: 123 MG/DL
GFR SERPL CREATININE-BSD FRML MDRD: 79 ML/MIN/1.73SQ M
GLUCOSE P FAST SERPL-MCNC: 100 MG/DL (ref 65–99)
HBA1C MFR BLD: 5.9 %
HCT VFR BLD AUTO: 49.2 % (ref 36.5–49.3)
HCV AB SER QL: NORMAL
HDLC SERPL-MCNC: 39 MG/DL
HGB BLD-MCNC: 16.2 G/DL (ref 12–17)
IMM GRANULOCYTES # BLD AUTO: 0.05 THOUSAND/UL (ref 0–0.2)
IMM GRANULOCYTES NFR BLD AUTO: 1 % (ref 0–2)
LDLC SERPL CALC-MCNC: 109 MG/DL (ref 0–100)
LYMPHOCYTES # BLD AUTO: 1.1 THOUSANDS/ΜL (ref 0.6–4.47)
LYMPHOCYTES NFR BLD AUTO: 17 % (ref 14–44)
MAGNESIUM SERPL-MCNC: 2.1 MG/DL (ref 1.6–2.6)
MCH RBC QN AUTO: 29.6 PG (ref 26.8–34.3)
MCHC RBC AUTO-ENTMCNC: 32.9 G/DL (ref 31.4–37.4)
MCV RBC AUTO: 90 FL (ref 82–98)
MONOCYTES # BLD AUTO: 0.59 THOUSAND/ΜL (ref 0.17–1.22)
MONOCYTES NFR BLD AUTO: 9 % (ref 4–12)
NEUTROPHILS # BLD AUTO: 4.58 THOUSANDS/ΜL (ref 1.85–7.62)
NEUTS SEG NFR BLD AUTO: 68 % (ref 43–75)
NONHDLC SERPL-MCNC: 139 MG/DL
NRBC BLD AUTO-RTO: 0 /100 WBCS
PLATELET # BLD AUTO: 188 THOUSANDS/UL (ref 149–390)
PMV BLD AUTO: 8.9 FL (ref 8.9–12.7)
POTASSIUM SERPL-SCNC: 4.4 MMOL/L (ref 3.5–5.3)
PROT SERPL-MCNC: 7.6 G/DL (ref 6.4–8.2)
RBC # BLD AUTO: 5.47 MILLION/UL (ref 3.88–5.62)
SODIUM SERPL-SCNC: 142 MMOL/L (ref 136–145)
T4 FREE SERPL-MCNC: 1.01 NG/DL (ref 0.76–1.46)
TRIGL SERPL-MCNC: 148 MG/DL
TSH SERPL DL<=0.05 MIU/L-ACNC: 1.55 UIU/ML (ref 0.36–3.74)
WBC # BLD AUTO: 6.63 THOUSAND/UL (ref 4.31–10.16)

## 2020-02-13 PROCEDURE — 80061 LIPID PANEL: CPT

## 2020-02-13 PROCEDURE — 84439 ASSAY OF FREE THYROXINE: CPT

## 2020-02-13 PROCEDURE — 87389 HIV-1 AG W/HIV-1&-2 AB AG IA: CPT

## 2020-02-13 PROCEDURE — 80053 COMPREHEN METABOLIC PANEL: CPT

## 2020-02-13 PROCEDURE — 82306 VITAMIN D 25 HYDROXY: CPT

## 2020-02-13 PROCEDURE — 83735 ASSAY OF MAGNESIUM: CPT

## 2020-02-13 PROCEDURE — 36415 COLL VENOUS BLD VENIPUNCTURE: CPT

## 2020-02-13 PROCEDURE — 84443 ASSAY THYROID STIM HORMONE: CPT

## 2020-02-13 PROCEDURE — 86803 HEPATITIS C AB TEST: CPT

## 2020-02-13 PROCEDURE — 85025 COMPLETE CBC W/AUTO DIFF WBC: CPT

## 2020-02-13 PROCEDURE — 83036 HEMOGLOBIN GLYCOSYLATED A1C: CPT

## 2020-02-14 LAB — HIV 1+2 AB+HIV1 P24 AG SERPL QL IA: NORMAL

## 2020-02-15 DIAGNOSIS — I10 ESSENTIAL HYPERTENSION: ICD-10-CM

## 2020-02-16 RX ORDER — AMLODIPINE BESYLATE 5 MG/1
TABLET ORAL
Qty: 30 TABLET | Refills: 1 | OUTPATIENT
Start: 2020-02-16

## 2020-02-17 RX ORDER — LISINOPRIL 20 MG/1
TABLET ORAL
Qty: 30 TABLET | Refills: 3 | Status: SHIPPED | OUTPATIENT
Start: 2020-02-17 | End: 2020-05-11

## 2020-02-19 ENCOUNTER — OFFICE VISIT (OUTPATIENT)
Dept: PAIN MEDICINE | Facility: CLINIC | Age: 60
End: 2020-02-19
Payer: COMMERCIAL

## 2020-02-19 VITALS
HEIGHT: 72 IN | BODY MASS INDEX: 36.57 KG/M2 | WEIGHT: 270 LBS | HEART RATE: 84 BPM | RESPIRATION RATE: 18 BRPM | SYSTOLIC BLOOD PRESSURE: 155 MMHG | DIASTOLIC BLOOD PRESSURE: 88 MMHG

## 2020-02-19 DIAGNOSIS — G89.4 CHRONIC PAIN SYNDROME: Primary | ICD-10-CM

## 2020-02-19 DIAGNOSIS — R10.84 ABDOMINAL PAIN, GENERALIZED: ICD-10-CM

## 2020-02-19 PROCEDURE — 3077F SYST BP >= 140 MM HG: CPT | Performed by: ANESTHESIOLOGY

## 2020-02-19 PROCEDURE — 99214 OFFICE O/P EST MOD 30 MIN: CPT | Performed by: ANESTHESIOLOGY

## 2020-02-19 PROCEDURE — 3008F BODY MASS INDEX DOCD: CPT | Performed by: ANESTHESIOLOGY

## 2020-02-19 PROCEDURE — 3079F DIAST BP 80-89 MM HG: CPT | Performed by: ANESTHESIOLOGY

## 2020-02-19 RX ORDER — CELECOXIB 50 MG/1
50 CAPSULE ORAL 2 TIMES DAILY
COMMUNITY

## 2020-02-19 NOTE — PROGRESS NOTES
Assessment:  1  Chronic pain syndrome     2  Abdominal pain, generalized  FL spine and pain procedure         Plan:  49-year-old male who presents today with left-sided abdominal pain  Patient had a bilateral TAP block with 100% pain relief for over 1 year duration  Pain has gradually returned  Pain is primarily left-sided and is neuropathic in nature  At this time, I will schedule patient for repeat left transversus abdominal plane block  Complete risks and benefits including bleeding, infection, tissue reaction, nerve injury and allergic reaction were discussed  The approach was demonstrated using models and literature was provided  Verbal and written consent was obtained  My impressions and treatment recommendations were discussed in detail with the patient who verbalized understanding and had no further questions  Discharge instructions were provided  I personally saw and examined the patient and I agree with the above discussed plan of care  New Medications Ordered This Visit   Medications    celecoxib (CeleBREX) 50 MG capsule     Sig: Take 50 mg by mouth 2 (two) times a day       History of Present Illness:  Suzie Rolle is a 61 y o  male who presents for a follow up office visit in regards to Abdominal Pain (LEFT)  The patients current symptoms include Left-sided abdominal pain  Of note, patient reports 5/10 pain  He had left transversus abdominal plane block with 100% pain relief  Today he states pain has gradually returned  I have personally reviewed and/or updated the patient's past medical history, past surgical history, family history, social history, current medications, allergies, and vital signs today  Review of Systems   Gastrointestinal: Positive for constipation and diarrhea     Musculoskeletal:        Pain in extremity (stomach and area around)       Patient Active Problem List   Diagnosis    Recurrent umbilical hernia    Generalized abdominal pain    Post-op pain  Hyperlipidemia    Hypertension    Obesity    Abdominal wall pain    Chronic pain syndrome    Abdominal pain, generalized    Polyuria    Diarrhea    Strain of rectus abdominis muscle    Impaired fasting glucose    Renal cyst, left       Past Medical History:   Diagnosis Date    Hypertension     Hypertension     Kidney stone     Recurrent umbilical hernia with incarceration     resolved 12/11/2017       Past Surgical History:   Procedure Laterality Date    CYSTOSCOPY      HERNIA REPAIR      x2    HIP DEBRIDEMENT Right     incision and drainage of skin of abcess hip    LAPAROSCOPY N/A 3/14/2018    Procedure: DIAGNOSTIC LAPAROSCOPY; LYSIS OF ADHESIONS;  Surgeon: Amy Elder MD;  [facility];  Service: General    NERVE BLOCK Bilateral 7/31/2018    Procedure: SPLANCHNIC NERVE BLOCK;  Surgeon: Sona Jordan MD;  [facility];  Service: Pain Management     [place] LAP, VENTRAL HERNIA REPAIR,REDUCIBLE N/A 12/6/2017    Procedure: LAPAROSCOPIC UMBILICAL HERNIA REPAIR WITH MESH;  Surgeon: Amy Elder MD;  [facility];  Service: General       Family History   Problem Relation Age of Onset    Hypertension Father     Cancer Father         throat        Social History     Occupational History    Not on file   Tobacco Use    Smoking status: Current Every Day Smoker     Packs/day: 0 20     Years: 30 00     Pack years: 6 00     Types: Cigarettes    Smokeless tobacco: Never Used   Substance and Sexual Activity    Alcohol use: Yes     Comment: rarely    Drug use: No    Sexual activity: Yes     Partners: Female       Current Outpatient Medications on File Prior to Visit   Medication Sig    amLODIPine (NORVASC) 10 mg tablet Take 1 tablet (10 mg total) by mouth daily    celecoxib (CeleBREX) 50 MG capsule Take 50 mg by mouth 2 (two) times a day    lisinopril (ZESTRIL) 20 mg tablet TAKE 1 TABLET BY MOUTH EVERY DAY    [DISCONTINUED] gabapentin (NEURONTIN) 300 mg capsule     [DISCONTINUED] HYDROcodone-acetaminophen (NORCO) 5-325 mg per tablet TAKE 1 TABLET 5 TIMES A DAY    [DISCONTINUED] methocarbamol (ROBAXIN) 500 mg tablet Take 1 tablet (500 mg total) by mouth 3 (three) times a day as needed for muscle spasms (Patient not taking: Reported on 2/6/2020)     No current facility-administered medications on file prior to visit  Allergies   Allergen Reactions    Ceclor [Cefaclor] Hives       Physical Exam:    /88   Pulse 84   Resp 18   Ht 6' (1 829 m)   Wt 122 kg (270 lb)   BMI 36 62 kg/m²     Constitutional:normal, well developed, well nourished, alert, in no distress and non-toxic and no overt pain behavior    Eyes:anicteric  HEENT:grossly intact  Neck:supple, symmetric, trachea midline and no masses   Pulmonary:even and unlabored  Cardiovascular:No edema or pitting edema present  Skin:Normal without rashes or lesions and well hydrated  Psychiatric:Mood and affect appropriate  Neurologic:Cranial Nerves II-XII grossly intact  Musculoskeletal:normal    Imaging

## 2020-03-03 ENCOUNTER — PROCEDURE VISIT (OUTPATIENT)
Dept: PAIN MEDICINE | Facility: CLINIC | Age: 60
End: 2020-03-03
Payer: COMMERCIAL

## 2020-03-03 ENCOUNTER — HOSPITAL ENCOUNTER (OUTPATIENT)
Dept: ULTRASOUND IMAGING | Facility: CLINIC | Age: 60
Discharge: HOME | End: 2020-03-03
Payer: COMMERCIAL

## 2020-03-03 VITALS
SYSTOLIC BLOOD PRESSURE: 128 MMHG | HEIGHT: 72 IN | HEART RATE: 82 BPM | RESPIRATION RATE: 18 BRPM | DIASTOLIC BLOOD PRESSURE: 81 MMHG | BODY MASS INDEX: 36.73 KG/M2 | WEIGHT: 271.2 LBS

## 2020-03-03 DIAGNOSIS — R10.84 ABDOMINAL PAIN, GENERALIZED: ICD-10-CM

## 2020-03-03 DIAGNOSIS — G89.29 CHRONIC GENERALIZED ABDOMINAL PAIN: ICD-10-CM

## 2020-03-03 DIAGNOSIS — R10.84 CHRONIC GENERALIZED ABDOMINAL PAIN: ICD-10-CM

## 2020-03-03 DIAGNOSIS — R10.9 ABDOMINAL WALL PAIN: ICD-10-CM

## 2020-03-03 PROCEDURE — 3008F BODY MASS INDEX DOCD: CPT | Performed by: ANESTHESIOLOGY

## 2020-03-03 PROCEDURE — 76942 ECHO GUIDE FOR BIOPSY: CPT | Performed by: ANESTHESIOLOGY

## 2020-03-03 PROCEDURE — 64488 TAP BLOCK BI INJECTION: CPT | Performed by: ANESTHESIOLOGY

## 2020-03-03 PROCEDURE — 76942 ECHO GUIDE FOR BIOPSY: CPT

## 2020-03-03 PROCEDURE — 64486 TAP BLOCK UNIL BY INJECTION: CPT

## 2020-03-03 NOTE — PROGRESS NOTES
Pre-procedure Diagnosis: Chronic Abdominal Pain   Post-procedure Diagnosis: Chronic Abdominal Pain   Procedure Title(s):  1  Left US guided Transversus Abdominal Plane         Block       2  Ultrasound Guided   Attending Surgeon:   Nain Lema MD  Anesthesia:   Local    Procedure Note:  The patients history and physical exam were reviewed  I explained the details of the procedure to the patient including the risks, benefits and other alternatives  We had an informed discussion and the patient verbalized understanding and signed the consent form  All questions were answered appropriately  The ultrasound probe is placed in a transverse plane to the lateral abdominal wall in the midaxillary line, between the lower costal margin and iliac crest  The use of ultrasound allows for accurate deposition of the local anaesthetic in the correct neurovascular plane - between the internal oblique muscle and the transversus abdominus muscle  Requirements: Ultrasound machine with a high frequency probe (10 MHz); Needle: 50mm needle; 10 ml syringe; 10 ml local anaesthetic 0 25 percent bupivacaine; dexamethasone 10mg 1cc  Procedure: The desired area of target (between the coastal margin and the iliac crest) was sterilely prepped and draped  3cc of 2% lidocaine was used to anesthesized the region  The stimuplex needle was then introduced in plane of the ultrasound probe directly under the probe and advanced until it reaches the plane between the internal oblique and transversus abdominis muscles  Upon reaching the plane, 2 ml of 2% lidocaine was injected to confirm correct needle position after which 8 ml of local anaesthetic solution (0 25 percent Marcaine) with 10mg dexamethasone was slowly injected  The transversus abdominis plane was visualized expanding with the injection (this appeared as a hypoechoic space)  Disposition:  The patient reported immediate pain relief  There was no complications   He was discharged home to follow up in 6 weeks

## 2020-03-17 ENCOUNTER — TELEPHONE (OUTPATIENT)
Dept: INTERNAL MEDICINE CLINIC | Facility: CLINIC | Age: 60
End: 2020-03-17

## 2020-03-17 NOTE — TELEPHONE ENCOUNTER
Discussion with wife:  Patient started feeling poorly yesterday  Complains of feeling tired and started feeling achy  He has had his flu immunization  He has not traveled to any endemic areas  The only thing his wife states they did was go to the grocery store twice  He does have fever to 101  2  Does complain of some chest heaviness and a cough but no increase in shortness of breath  Wife will monitor, if condition worsens she may need to take him to the emergency room

## 2020-03-18 ENCOUNTER — HOSPITAL ENCOUNTER (EMERGENCY)
Facility: HOSPITAL | Age: 60
Discharge: HOME | End: 2020-03-18
Attending: EMERGENCY MEDICINE | Admitting: EMERGENCY MEDICINE
Payer: COMMERCIAL

## 2020-03-18 ENCOUNTER — APPOINTMENT (EMERGENCY)
Dept: RADIOLOGY | Facility: HOSPITAL | Age: 60
End: 2020-03-18
Payer: COMMERCIAL

## 2020-03-18 VITALS
SYSTOLIC BLOOD PRESSURE: 155 MMHG | RESPIRATION RATE: 20 BRPM | HEART RATE: 92 BPM | DIASTOLIC BLOOD PRESSURE: 83 MMHG | OXYGEN SATURATION: 97 % | TEMPERATURE: 99.2 F

## 2020-03-18 DIAGNOSIS — J40 BRONCHITIS: Primary | ICD-10-CM

## 2020-03-18 LAB
ALBUMIN SERPL BCP-MCNC: 3.3 G/DL (ref 3.5–5)
ALP SERPL-CCNC: 76 U/L (ref 46–116)
ALT SERPL W P-5'-P-CCNC: 23 U/L (ref 12–78)
ANION GAP SERPL CALCULATED.3IONS-SCNC: 9 MMOL/L (ref 4–13)
APTT PPP: 34 SECONDS (ref 23–37)
AST SERPL W P-5'-P-CCNC: 20 U/L (ref 5–45)
ATRIAL RATE: 100 BPM
BASE EX.OXY STD BLDV CALC-SCNC: 89.6 % (ref 60–80)
BASE EXCESS BLDV CALC-SCNC: 2.3 MMOL/L
BASOPHILS # BLD AUTO: 0.07 THOUSANDS/ΜL (ref 0–0.1)
BASOPHILS NFR BLD AUTO: 1 % (ref 0–1)
BILIRUB SERPL-MCNC: 0.6 MG/DL (ref 0.2–1)
BUN SERPL-MCNC: 17 MG/DL (ref 5–25)
CALCIUM SERPL-MCNC: 9 MG/DL (ref 8.3–10.1)
CHLORIDE SERPL-SCNC: 103 MMOL/L (ref 100–108)
CO2 SERPL-SCNC: 25 MMOL/L (ref 21–32)
CREAT SERPL-MCNC: 0.91 MG/DL (ref 0.6–1.3)
EOSINOPHIL # BLD AUTO: 0.48 THOUSAND/ΜL (ref 0–0.61)
EOSINOPHIL NFR BLD AUTO: 4 % (ref 0–6)
ERYTHROCYTE [DISTWIDTH] IN BLOOD BY AUTOMATED COUNT: 14 % (ref 11.6–15.1)
FLUAV RNA NPH QL NAA+PROBE: NORMAL
FLUBV RNA NPH QL NAA+PROBE: NORMAL
GFR SERPL CREATININE-BSD FRML MDRD: 92 ML/MIN/1.73SQ M
GLUCOSE SERPL-MCNC: 143 MG/DL (ref 65–140)
HCO3 BLDV-SCNC: 26.9 MMOL/L (ref 24–30)
HCT VFR BLD AUTO: 46.5 % (ref 36.5–49.3)
HGB BLD-MCNC: 15.4 G/DL (ref 12–17)
IMM GRANULOCYTES # BLD AUTO: 0.05 THOUSAND/UL (ref 0–0.2)
IMM GRANULOCYTES NFR BLD AUTO: 0 % (ref 0–2)
INR PPP: 1.02 (ref 0.84–1.19)
LACTATE SERPL-SCNC: 1.2 MMOL/L (ref 0.5–2)
LYMPHOCYTES # BLD AUTO: 0.82 THOUSANDS/ΜL (ref 0.6–4.47)
LYMPHOCYTES NFR BLD AUTO: 7 % (ref 14–44)
MCH RBC QN AUTO: 30 PG (ref 26.8–34.3)
MCHC RBC AUTO-ENTMCNC: 33.1 G/DL (ref 31.4–37.4)
MCV RBC AUTO: 91 FL (ref 82–98)
MONOCYTES # BLD AUTO: 0.65 THOUSAND/ΜL (ref 0.17–1.22)
MONOCYTES NFR BLD AUTO: 6 % (ref 4–12)
NEUTROPHILS # BLD AUTO: 9.43 THOUSANDS/ΜL (ref 1.85–7.62)
NEUTS SEG NFR BLD AUTO: 82 % (ref 43–75)
NRBC BLD AUTO-RTO: 0 /100 WBCS
O2 CT BLDV-SCNC: 18.7 ML/DL
P AXIS: 69 DEGREES
PCO2 BLDV: 41.7 MM HG (ref 42–50)
PH BLDV: 7.43 [PH] (ref 7.3–7.4)
PLATELET # BLD AUTO: 155 THOUSANDS/UL (ref 149–390)
PMV BLD AUTO: 9.3 FL (ref 8.9–12.7)
PO2 BLDV: 54 MM HG (ref 35–45)
POTASSIUM SERPL-SCNC: 3.7 MMOL/L (ref 3.5–5.3)
PR INTERVAL: 158 MS
PROCALCITONIN SERPL-MCNC: 0.19 NG/ML
PROT SERPL-MCNC: 7.3 G/DL (ref 6.4–8.2)
PROTHROMBIN TIME: 13.4 SECONDS (ref 11.6–14.5)
QRS AXIS: 2 DEGREES
QRSD INTERVAL: 86 MS
QT INTERVAL: 362 MS
QTC INTERVAL: 466 MS
RBC # BLD AUTO: 5.14 MILLION/UL (ref 3.88–5.62)
RSV RNA NPH QL NAA+PROBE: NORMAL
SODIUM SERPL-SCNC: 137 MMOL/L (ref 136–145)
T WAVE AXIS: 72 DEGREES
TROPONIN I SERPL-MCNC: <0.02 NG/ML
VENTRICULAR RATE: 100 BPM
WBC # BLD AUTO: 11.5 THOUSAND/UL (ref 4.31–10.16)

## 2020-03-18 PROCEDURE — 87631 RESP VIRUS 3-5 TARGETS: CPT | Performed by: NURSE PRACTITIONER

## 2020-03-18 PROCEDURE — U0002 COVID-19 LAB TEST NON-CDC: HCPCS | Performed by: NURSE PRACTITIONER

## 2020-03-18 PROCEDURE — 94644 CONT INHLJ TX 1ST HOUR: CPT

## 2020-03-18 PROCEDURE — 83605 ASSAY OF LACTIC ACID: CPT | Performed by: NURSE PRACTITIONER

## 2020-03-18 PROCEDURE — 87635 SARS-COV-2 COVID-19 AMP PRB: CPT | Performed by: NURSE PRACTITIONER

## 2020-03-18 PROCEDURE — 99285 EMERGENCY DEPT VISIT HI MDM: CPT

## 2020-03-18 PROCEDURE — 93005 ELECTROCARDIOGRAM TRACING: CPT

## 2020-03-18 PROCEDURE — 71045 X-RAY EXAM CHEST 1 VIEW: CPT

## 2020-03-18 PROCEDURE — 80053 COMPREHEN METABOLIC PANEL: CPT | Performed by: NURSE PRACTITIONER

## 2020-03-18 PROCEDURE — 87040 BLOOD CULTURE FOR BACTERIA: CPT | Performed by: NURSE PRACTITIONER

## 2020-03-18 PROCEDURE — 82805 BLOOD GASES W/O2 SATURATION: CPT | Performed by: NURSE PRACTITIONER

## 2020-03-18 PROCEDURE — 36415 COLL VENOUS BLD VENIPUNCTURE: CPT | Performed by: NURSE PRACTITIONER

## 2020-03-18 PROCEDURE — 84145 PROCALCITONIN (PCT): CPT | Performed by: NURSE PRACTITIONER

## 2020-03-18 PROCEDURE — 85610 PROTHROMBIN TIME: CPT | Performed by: NURSE PRACTITIONER

## 2020-03-18 PROCEDURE — 85730 THROMBOPLASTIN TIME PARTIAL: CPT | Performed by: NURSE PRACTITIONER

## 2020-03-18 PROCEDURE — 85025 COMPLETE CBC W/AUTO DIFF WBC: CPT | Performed by: NURSE PRACTITIONER

## 2020-03-18 PROCEDURE — 99284 EMERGENCY DEPT VISIT MOD MDM: CPT | Performed by: NURSE PRACTITIONER

## 2020-03-18 PROCEDURE — 93010 ELECTROCARDIOGRAM REPORT: CPT | Performed by: INTERNAL MEDICINE

## 2020-03-18 PROCEDURE — 96375 TX/PRO/DX INJ NEW DRUG ADDON: CPT

## 2020-03-18 PROCEDURE — 94760 N-INVAS EAR/PLS OXIMETRY 1: CPT

## 2020-03-18 PROCEDURE — 96365 THER/PROPH/DIAG IV INF INIT: CPT

## 2020-03-18 PROCEDURE — 84484 ASSAY OF TROPONIN QUANT: CPT | Performed by: NURSE PRACTITIONER

## 2020-03-18 RX ORDER — SODIUM CHLORIDE FOR INHALATION 0.9 %
3 VIAL, NEBULIZER (ML) INHALATION ONCE
Status: COMPLETED | OUTPATIENT
Start: 2020-03-18 | End: 2020-03-18

## 2020-03-18 RX ORDER — PREDNISONE 20 MG/1
60 TABLET ORAL DAILY
Qty: 15 TABLET | Refills: 0 | Status: SHIPPED | OUTPATIENT
Start: 2020-03-18 | End: 2020-03-23

## 2020-03-18 RX ORDER — SODIUM CHLORIDE, SODIUM GLUCONATE, SODIUM ACETATE, POTASSIUM CHLORIDE, MAGNESIUM CHLORIDE, SODIUM PHOSPHATE, DIBASIC, AND POTASSIUM PHOSPHATE .53; .5; .37; .037; .03; .012; .00082 G/100ML; G/100ML; G/100ML; G/100ML; G/100ML; G/100ML; G/100ML
1000 INJECTION, SOLUTION INTRAVENOUS ONCE
Status: DISCONTINUED | OUTPATIENT
Start: 2020-03-19 | End: 2020-03-18 | Stop reason: HOSPADM

## 2020-03-18 RX ORDER — SODIUM CHLORIDE, SODIUM GLUCONATE, SODIUM ACETATE, POTASSIUM CHLORIDE, MAGNESIUM CHLORIDE, SODIUM PHOSPHATE, DIBASIC, AND POTASSIUM PHOSPHATE .53; .5; .37; .037; .03; .012; .00082 G/100ML; G/100ML; G/100ML; G/100ML; G/100ML; G/100ML; G/100ML
1000 INJECTION, SOLUTION INTRAVENOUS ONCE
Status: COMPLETED | OUTPATIENT
Start: 2020-03-18 | End: 2020-03-18

## 2020-03-18 RX ORDER — SODIUM CHLORIDE FOR INHALATION 0.9 %
3 VIAL, NEBULIZER (ML) INHALATION AS NEEDED
Qty: 60 ML | Refills: 0 | Status: SHIPPED | OUTPATIENT
Start: 2020-03-18 | End: 2020-03-23

## 2020-03-18 RX ORDER — ALBUTEROL SULFATE 90 UG/1
2 AEROSOL, METERED RESPIRATORY (INHALATION) EVERY 6 HOURS PRN
Qty: 1 INHALER | Refills: 0 | Status: SHIPPED | OUTPATIENT
Start: 2020-03-18 | End: 2020-03-28

## 2020-03-18 RX ORDER — DOXYCYCLINE HYCLATE 100 MG/1
100 CAPSULE ORAL 2 TIMES DAILY
Qty: 20 CAPSULE | Refills: 0 | Status: SHIPPED | OUTPATIENT
Start: 2020-03-18 | End: 2020-03-28

## 2020-03-18 RX ORDER — IPRATROPIUM BROMIDE AND ALBUTEROL SULFATE 2.5; .5 MG/3ML; MG/3ML
3 SOLUTION RESPIRATORY (INHALATION) 4 TIMES DAILY
Qty: 60 ML | Refills: 0 | Status: SHIPPED | OUTPATIENT
Start: 2020-03-18 | End: 2020-03-23

## 2020-03-18 RX ORDER — DEXTROMETHORPHAN HYDROBROMIDE AND PROMETHAZINE HYDROCHLORIDE 15; 6.25 MG/5ML; MG/5ML
5 SOLUTION ORAL 4 TIMES DAILY PRN
Qty: 118 ML | Refills: 0 | Status: SHIPPED | OUTPATIENT
Start: 2020-03-18 | End: 2020-03-23

## 2020-03-18 RX ORDER — ACETAMINOPHEN 325 MG/1
975 TABLET ORAL ONCE
Status: COMPLETED | OUTPATIENT
Start: 2020-03-18 | End: 2020-03-18

## 2020-03-18 RX ORDER — METHYLPREDNISOLONE SODIUM SUCCINATE 125 MG/2ML
125 INJECTION, POWDER, LYOPHILIZED, FOR SOLUTION INTRAMUSCULAR; INTRAVENOUS ONCE
Status: COMPLETED | OUTPATIENT
Start: 2020-03-18 | End: 2020-03-18

## 2020-03-18 RX ADMIN — IPRATROPIUM BROMIDE 1 MG: 0.5 SOLUTION RESPIRATORY (INHALATION) at 08:32

## 2020-03-18 RX ADMIN — ALBUTEROL SULFATE 10 MG: 2.5 SOLUTION RESPIRATORY (INHALATION) at 08:31

## 2020-03-18 RX ADMIN — ACETAMINOPHEN 975 MG: 325 TABLET ORAL at 07:51

## 2020-03-18 RX ADMIN — METHYLPREDNISOLONE SODIUM SUCCINATE 125 MG: 125 INJECTION, POWDER, FOR SOLUTION INTRAMUSCULAR; INTRAVENOUS at 07:51

## 2020-03-18 RX ADMIN — SODIUM CHLORIDE, SODIUM GLUCONATE, SODIUM ACETATE, POTASSIUM CHLORIDE, MAGNESIUM CHLORIDE, SODIUM PHOSPHATE, DIBASIC, AND POTASSIUM PHOSPHATE 1000 ML: .53; .5; .37; .037; .03; .012; .00082 INJECTION, SOLUTION INTRAVENOUS at 07:52

## 2020-03-18 RX ADMIN — ISODIUM CHLORIDE 3 ML: 0.03 SOLUTION RESPIRATORY (INHALATION) at 08:32

## 2020-03-18 NOTE — ED NOTES
Pt masked and instructed on home isolation procedure and s/s indicating a need to return to ED at departure  Pt agreeable and indicates understanding       Gage Hills RN  03/18/20 6953

## 2020-03-18 NOTE — ED PROVIDER NOTES
History  Chief Complaint   Patient presents with    Cough     Pt states he has had a cough and SOB with a fever since monday     Shortness of Breath       Shortness of Breath   Severity:  Moderate  Onset quality:  Gradual  Progression:  Worsening  Chronicity:  New  Context comment:  Exposed to sick grandchildren with fevers on saturday  Relieved by:  None tried  Worsened by:  Nothing  Ineffective treatments:  None tried  Associated symptoms: cough, fever and wheezing    Associated symptoms: no abdominal pain, no chest pain, no diaphoresis, no ear pain, no headaches, no rash, no sore throat and no vomiting    Cough:     Cough characteristics:  Dry    Severity:  Moderate    Onset quality:  Gradual  Fever:     Temp source:  Oral      Prior to Admission Medications   Prescriptions Last Dose Informant Patient Reported? Taking?    amLODIPine (NORVASC) 10 mg tablet  Self No No   Sig: Take 1 tablet (10 mg total) by mouth daily   celecoxib (CeleBREX) 50 MG capsule  Self Yes No   Sig: Take 50 mg by mouth 2 (two) times a day   lisinopril (ZESTRIL) 20 mg tablet  Self No No   Sig: TAKE 1 TABLET BY MOUTH EVERY DAY      Facility-Administered Medications: None       Past Medical History:   Diagnosis Date    Hypertension     Hypertension     Kidney stone     Recurrent umbilical hernia with incarceration     resolved 12/11/2017       Past Surgical History:   Procedure Laterality Date    BACK SURGERY  10/2019    CYSTOSCOPY      HERNIA REPAIR      x2    HIP DEBRIDEMENT Right     incision and drainage of skin of abcess hip    LAPAROSCOPY N/A 3/14/2018    Procedure: DIAGNOSTIC LAPAROSCOPY; LYSIS OF ADHESIONS;  Surgeon: Flako Fay MD;  [facility];  Service: General    NERVE BLOCK Bilateral 7/31/2018    Procedure: SPLANCHNIC NERVE BLOCK;  Surgeon: Jerod Saravia MD;  [facility];  Service: Pain Management     ND LAP, VENTRAL HERNIA REPAIR,REDUCIBLE N/A 12/6/2017    Procedure: LAPAROSCOPIC UMBILICAL HERNIA REPAIR WITH MESH;  Surgeon: Nieves Berry MD;  [facility];  Service: General    US GUIDANCE  3/3/2020       Family History   Problem Relation Age of Onset    Hypertension Father     Cancer Father         throat      I have reviewed and agree with the history as documented  E-Cigarette/Vaping    E-Cigarette Use Never User      E-Cigarette/Vaping Substances    Nicotine No     THC No     CBD No     Flavoring No     Other No     Unknown No      Social History     Tobacco Use    Smoking status: Current Every Day Smoker     Packs/day: 0 20     Years: 30 00     Pack years: 6 00     Types: Cigarettes    Smokeless tobacco: Never Used   Substance Use Topics    Alcohol use: Yes     Comment: rarely    Drug use: No       Review of Systems   Constitutional: Positive for fever  Negative for diaphoresis and fatigue  HENT: Negative for congestion, ear pain, nosebleeds and sore throat  Eyes: Negative for photophobia, pain, discharge and visual disturbance  Respiratory: Positive for cough, shortness of breath and wheezing  Negative for choking and chest tightness  Cardiovascular: Negative for chest pain and palpitations  Gastrointestinal: Negative for abdominal distention, abdominal pain, diarrhea and vomiting  Genitourinary: Negative for dysuria, flank pain and frequency  Musculoskeletal: Negative for back pain, gait problem and joint swelling  Skin: Negative for color change and rash  Neurological: Negative for dizziness, syncope and headaches  Psychiatric/Behavioral: Negative for behavioral problems and confusion  The patient is not nervous/anxious  All other systems reviewed and are negative  Physical Exam  Physical Exam   Constitutional: He is oriented to person, place, and time  He appears well-developed and well-nourished  HENT:   Head: Normocephalic and atraumatic  Eyes: Pupils are equal, round, and reactive to light  Neck: Normal range of motion   Neck supple  Cardiovascular: Normal rate, regular rhythm, normal heart sounds and normal pulses  PMI is not displaced  Pulmonary/Chest: Effort normal  No respiratory distress  He has decreased breath sounds  He has wheezes  Abdominal: Soft  He exhibits no distension  There is no guarding  Musculoskeletal: Normal range of motion  Lymphadenopathy:     He has no cervical adenopathy  Neurological: He is alert and oriented to person, place, and time  Skin: Skin is warm and dry  No rash noted  He is not diaphoretic  No pallor  Psychiatric: He has a normal mood and affect  Vitals reviewed        Vital Signs  ED Triage Vitals   Temperature Pulse Respirations Blood Pressure SpO2   03/18/20 0634 03/18/20 0634 03/18/20 0634 03/18/20 0634 03/18/20 0634   99 2 °F (37 3 °C) (!) 112 22 (!) 214/95 93 %      Temp src Heart Rate Source Patient Position - Orthostatic VS BP Location FiO2 (%)   -- 03/18/20 0634 03/18/20 0802 03/18/20 0634 --    Monitor Lying Right arm       Pain Score       --                  Vitals:    03/18/20 0634 03/18/20 0802   BP: (!) 214/95 155/83   Pulse: (!) 112 92   Patient Position - Orthostatic VS:  Lying         Visual Acuity      ED Medications  Medications   multi-electrolyte (PLASMALYTE-A/ISOLYTE-S PH 7 4) IV solution 1,000 mL (0 mL Intravenous Stopped 3/18/20 0822)   acetaminophen (TYLENOL) tablet 975 mg (975 mg Oral Given 3/18/20 0751)   methylPREDNISolone sodium succinate (Solu-MEDROL) injection 125 mg (125 mg Intravenous Given 3/18/20 0751)   albuterol inhalation solution 10 mg (10 mg Nebulization Given 3/18/20 0831)     And   ipratropium (ATROVENT) 0 02 % inhalation solution 1 mg (1 mg Nebulization Given 3/18/20 0832)     And   sodium chloride 0 9 % inhalation solution 3 mL (3 mL Nebulization Given 3/18/20 0142)       Diagnostic Studies  Results Reviewed     Procedure Component Value Units Date/Time    2019 Novel Coronavirus (COVID-19), SHANTEL [[id number]]     Lab Status:  No result Specimen:  Nasopharyngeal Swab     Influenza A/B and RSV PCR [[id number]]  (Normal) Collected:  03/18/20 0727    Lab Status:  Final result Specimen:  Nasopharyngeal Swab Updated:  03/18/20 0836     INFLUENZA A PCR None Detected     INFLUENZA B PCR None Detected     RSV PCR None Detected    Blood gas, Venous [[id number]]  (Abnormal) Collected:  03/18/20 0727    Lab Status:  Final result Specimen:  Blood from Arm, Left Updated:  03/18/20 0806     pH, Cristobal 7 428     pCO2, Cristobal 41 7 mm Hg      pO2, Cristobal 54 0 mm Hg      HCO3, Cristobal 26 9 mmol/L      Base Excess, Cristobal 2 3 mmol/L      O2 Content, Cristobal 18 7 ml/dL      O2 HGB, VENOUS 89 6 %     Lactic acid x2 [[id number]]  (Normal) Collected:  03/18/20 0727    Lab Status:  Final result Specimen:  Blood from Arm, Left Updated:  03/18/20 0804     LACTIC ACID 1 2 mmol/L     Narrative:       Result may be elevated if tourniquet was used during collection      Troponin I [[id number]]  (Normal) Collected:  03/18/20 0727    Lab Status:  Final result Specimen:  Blood from Arm, Left Updated:  03/18/20 0804     Troponin I <0 02 ng/mL     Comprehensive metabolic panel [[id number]]  (Abnormal) Collected:  03/18/20 0727    Lab Status:  Final result Specimen:  Blood from Arm, Left Updated:  03/18/20 0802     Sodium 137 mmol/L      Potassium 3 7 mmol/L      Chloride 103 mmol/L      CO2 25 mmol/L      ANION GAP 9 mmol/L      BUN 17 mg/dL      Creatinine 0 91 mg/dL      Glucose 143 mg/dL      Calcium 9 0 mg/dL      AST 20 U/L      ALT 23 U/L      Alkaline Phosphatase 76 U/L      Total Protein 7 3 g/dL      Albumin 3 3 g/dL      Total Bilirubin 0 60 mg/dL      eGFR 92 ml/min/1 73sq m     Narrative:       Mikey guidelines for Chronic Kidney Disease (CKD):     Stage 1 with normal or high GFR (GFR > 90 mL/min/1 73 square meters)    Stage 2 Mild CKD (GFR = 60-89 mL/min/1 73 square meters)    Stage 3A Moderate CKD (GFR = 45-59 mL/min/1 73 square meters)    Stage 3B Moderate CKD (GFR = 30-44 mL/min/1 73 square meters)    Stage 4 Severe CKD (GFR = 15-29 mL/min/1 73 square meters)    Stage 5 End Stage CKD (GFR <15 mL/min/1 73 square meters)  Note: GFR calculation is accurate only with a steady state creatinine    Procalcitonin [[id number]] Collected:  03/18/20 0727    Lab Status: In process Specimen:  Blood from Arm, Left Updated:  03/18/20 0801    Protime-INR [[id number]]  (Normal) Collected:  03/18/20 0728    Lab Status:  Final result Specimen:  Blood from Arm, Left Updated:  03/18/20 0756     Protime 13 4 seconds      INR 1 02    APTT [[id number]]  (Normal) Collected:  03/18/20 0728    Lab Status:  Final result Specimen:  Blood from Arm, Left Updated:  03/18/20 0756     PTT 34 seconds     CBC and differential [[id number]]  (Abnormal) Collected:  03/18/20 0727    Lab Status:  Final result Specimen:  Blood from Arm, Left Updated:  03/18/20 0744     WBC 11 50 Thousand/uL      RBC 5 14 Million/uL      Hemoglobin 15 4 g/dL      Hematocrit 46 5 %      MCV 91 fL      MCH 30 0 pg      MCHC 33 1 g/dL      RDW 14 0 %      MPV 9 3 fL      Platelets 874 Thousands/uL      nRBC 0 /100 WBCs      Neutrophils Relative 82 %      Immat GRANS % 0 %      Lymphocytes Relative 7 %      Monocytes Relative 6 %      Eosinophils Relative 4 %      Basophils Relative 1 %      Neutrophils Absolute 9 43 Thousands/µL      Immature Grans Absolute 0 05 Thousand/uL      Lymphocytes Absolute 0 82 Thousands/µL      Monocytes Absolute 0 65 Thousand/µL      Eosinophils Absolute 0 48 Thousand/µL      Basophils Absolute 0 07 Thousands/µL     Blood culture #2 [[id number]] Collected:  03/18/20 0700    Lab Status: In process Specimen:  Blood from Arm, Left Updated:  03/18/20 0741    Blood culture #1 [[id number]] Collected:  03/18/20 0700    Lab Status:   In process Specimen:  Blood from Arm, Left Updated:  03/18/20 0741    UA w Reflex to Microscopic w Reflex to Culture [[id number]]     Lab Status:  No result Specimen:  Urine XR chest portable - 1 view   Final Result by Xena Ellis DO (03/18 0893)   No acute cardiopulmonary disease  Workstation performed: [id number]                    Procedures  Procedures         ED Course                                 MDM  Number of Diagnoses or Management Options  Bronchitis: new and requires workup  Diagnosis management comments: Patient was mildly hypoxic when he came in with resting oxygen saturation around 92%  Clearly has wheezing and a component of bronchitis  Chest x-ray was negative for pneumonia at this point  Not complete two view however  For influenza testing was negative  COVID-19 testing performed in asthmatic male patient 61years old with cough and fever and exposure to children that had cough and fever  Sent home for self quarantine  Amount and/or Complexity of Data Reviewed  Clinical lab tests: reviewed and ordered  Tests in the radiology section of CPT®: reviewed and ordered  Independent visualization of images, tracings, or specimens: yes    Patient Progress  Patient progress: stable        Disposition  Final diagnoses:   Bronchitis     Time reflects when diagnosis was documented in both MDM as applicable and the Disposition within this note     Time User Action Codes Description Comment    3/18/2020  9:09 AM Robert Kendrick [facility] Bronchitis       ED Disposition     ED Disposition Condition Date/Time Comment    Discharge Stable Wed Mar 18, 2020  9:09 AM Sayra Cesar discharge to home/self care              Follow-up Information     Follow up With Specialties Details Why Contact Info    Alden Garcia MD Internal Medicine  As needed [address] [place] [address]  [phone]            Discharge Medication List as of 3/18/2020  9:15 AM      START taking these medications    Details   albuterol (PROVENTIL HFA,VENTOLIN HFA) 90 mcg/act inhaler Inhale 2 puffs every 6 (six) hours as needed for wheezing for up to 10 days, Starting Wed 3/18/2020, Until Sat 3/28/2020, Normal      doxycycline hyclate (VIBRAMYCIN) 100 mg capsule Take 1 capsule (100 mg total) by mouth 2 (two) times a day for 10 days, Starting Wed 3/18/2020, Until Sat 3/28/2020, Normal      ipratropium-albuterol (DUO-NEB) 0 5-2 5 mg/3 mL nebulizer solution Take 1 vial (3 mL total) by nebulization 4 (four) times a day for 5 days, Starting Wed 3/18/2020, Until Mon 3/23/2020, Normal      predniSONE 20 mg tablet Take 3 tablets (60 mg total) by mouth daily for 5 days, Starting Wed 3/18/2020, Until Mon 3/23/2020, Normal      Promethazine-DM (PHENERGAN-DM) 6 25-15 mg/5 mL oral syrup Take 5 mL by mouth 4 (four) times a day as needed for cough for up to 5 days, Starting Wed 3/18/2020, Until Mon 3/23/2020, Normal      sodium chloride 0 9 % nebulizer solution Take 3 mL by nebulization as needed for wheezing for up to 5 days, Starting Wed 3/18/2020, Until Mon 3/23/2020, Normal         CONTINUE these medications which have NOT CHANGED    Details   amLODIPine (NORVASC) 10 mg tablet Take 1 tablet (10 mg total) by mouth daily, Starting Thu 2/6/2020, Normal      celecoxib (CeleBREX) 50 MG capsule Take 50 mg by mouth 2 (two) times a day, Historical Med      lisinopril (ZESTRIL) 20 mg tablet TAKE 1 TABLET BY MOUTH EVERY DAY, Normal           Outpatient Discharge Orders   Nebulizer       PDMP Review     None          ED Provider  Electronically Signed by           ZANA Wells  03/18/20 1002

## 2020-03-18 NOTE — DISCHARGE INSTRUCTIONS
[address]    Your healthcare provider and/or public health staff have evaluated you and have determined that you do not need to be hospitalized at this time  At this time you can be isolated at home where you will be monitored by staff from your local or state health department  You should carefully follow the prevention and isolation steps below until a healthcare provider or local or state health department says that you can return to your normal activities  Stay home except to get medical care    People who are mildly ill with COVID-19 are able to isolate at home during their illness  You should restrict activities outside your home, except for getting medical care  Do not go to work, school, or public areas  Avoid using public transportation, ride-sharing, or taxis  Separate yourself from other people and animals in your home    People: As much as possible, you should stay in a specific room and away from other people in your home  Also, you should use a separate bathroom, if available  Animals: You should restrict contact with pets and other animals while you are sick with COVID-19, just like you would around other people  Although there have not been reports of pets or other animals becoming sick with COVID-19, it is still recommended that people sick with COVID-19 limit contact with animals until more information is known about the virus  When possible, have another member of your household care for your animals while you are sick  If you are sick with COVID-19, avoid contact with your pet, including petting, snuggling, being kissed or licked, and sharing food  If you must care for your pet or be around animals while you are sick, wash your hands before and after you interact with pets and wear a facemask  See COVID-19 and Animals for more information      Call ahead before visiting your doctor    If you have a medical appointment, call the healthcare provider and tell them that you have or may have COVID-19  This will help the healthcare providers office take steps to keep other people from getting infected or exposed  Wear a facemask    You should wear a facemask when you are around other people (e g , sharing a room or vehicle) or pets and before you enter a healthcare providers office  If you are not able to wear a facemask (for example, because it causes trouble breathing), then people who live with you should not stay in the same room with you, or they should wear a facemask if they enter your room  Cover your coughs and sneezes    Cover your mouth and nose with a tissue when you cough or sneeze  Throw used tissues in a lined trash can  Immediately wash your hands with soap and water for at least 20 seconds or, if soap and water are not available, clean your hands with an alcohol-based hand  that contains at least 60% alcohol  Clean your hands often    Wash your hands often with soap and water for at least 20 seconds, especially after blowing your nose, coughing, or sneezing; going to the bathroom; and before eating or preparing food  If soap and water are not readily available, use an alcohol-based hand  with at least 60% alcohol, covering all surfaces of your hands and rubbing them together until they feel dry  Soap and water are the best option if hands are visibly dirty  Avoid touching your eyes, nose, and mouth with unwashed hands  Avoid sharing personal household items    You should not share dishes, drinking glasses, cups, eating utensils, towels, or bedding with other people or pets in your home  After using these items, they should be washed thoroughly with soap and water  Clean all high-touch surfaces everyday    High touch surfaces include counters, tabletops, doorknobs, bathroom fixtures, toilets, phones, keyboards, tablets, and bedside tables  Also, clean any surfaces that may have blood, stool, or body fluids on them   Use a household cleaning spray or wipe, according to the label instructions  Labels contain instructions for safe and effective use of the cleaning product including precautions you should take when applying the product, such as wearing gloves and making sure you have good ventilation during use of the product  Monitor your symptoms    Seek prompt medical attention if your illness is worsening (e g , difficulty breathing)  Before seeking care, call your healthcare provider and tell them that you have, or are being evaluated for, COVID-19  Put on a facemask before you enter the facility  These steps will help the healthcare providers office to keep other people in the office or waiting room from getting infected or exposed  Ask your healthcare provider to call the local or state health department  Persons who are placed under active monitoring or facilitated self-monitoring should follow instructions provided by their local health department or occupational health professionals, as appropriate  If you have a medical emergency and need to call 911, notify the dispatch personnel that you have, or are being evaluated for COVID-19  If possible, put on a facemask before emergency medical services arrive  Discontinuing home isolation    Patients with confirmed COVID-19 should remain under home isolation precautions until the risk of secondary transmission to others is thought to be low  The decision to discontinue home isolation precautions should be made on a case-by-case basis, in consultation with healthcare providers and state and local health departments      Source: [employer]

## 2020-03-21 LAB — SARS-COV-2 RNA SPEC QL NAA+PROBE: NOT DETECTED

## 2020-03-22 NOTE — RESULT ENCOUNTER NOTE
Called patient and informed him of negative COVID test  Advised patient to remained quarantined for 72 hours without a fever or symptoms  Call back complete

## 2020-03-23 ENCOUNTER — TELEPHONE (OUTPATIENT)
Dept: INTERNAL MEDICINE CLINIC | Facility: CLINIC | Age: 60
End: 2020-03-23

## 2020-03-23 LAB
BACTERIA BLD CULT: NORMAL
BACTERIA BLD CULT: NORMAL

## 2020-03-23 NOTE — TELEPHONE ENCOUNTER
Patient's wife returning call from Friday night  Went to the hospital on Tuesday and Catejedjacek Knox had the virus test done and they called Sunday and he is negative  Told patient to stay quarantined for 72 hours from Sunday night  If fever goes up patient should return to hospital     Patient still not feeling well and still having trouble with breathing sometimes  Patient is still doing his treatments but having a lot  of mucus coming up when he coughs and diarrhea      Best contact # [phone]  Or # [phone]

## 2020-03-23 NOTE — TELEPHONE ENCOUNTER
Sounds like he definitely has a virus, thank heaven it is not the covid virus    Clear liquids times 24 hours, then brat diet (bananas, white rice, applesauce, tea, toast) then if tolerated well without diarrhea can slowly progress diet to normal

## 2020-03-26 ENCOUNTER — HOSPITAL ENCOUNTER (EMERGENCY)
Facility: HOSPITAL | Age: 60
Discharge: HOME | End: 2020-03-26
Attending: EMERGENCY MEDICINE | Admitting: EMERGENCY MEDICINE
Payer: COMMERCIAL

## 2020-03-26 VITALS
HEART RATE: 89 BPM | TEMPERATURE: 97.6 F | DIASTOLIC BLOOD PRESSURE: 118 MMHG | OXYGEN SATURATION: 96 % | RESPIRATION RATE: 18 BRPM | BODY MASS INDEX: 36.7 KG/M2 | SYSTOLIC BLOOD PRESSURE: 208 MMHG | WEIGHT: 271 LBS | HEIGHT: 72 IN

## 2020-03-26 DIAGNOSIS — R07.89 CHEST WALL PAIN: ICD-10-CM

## 2020-03-26 DIAGNOSIS — B34.9 VIRAL ILLNESS: Primary | ICD-10-CM

## 2020-03-26 LAB
ANION GAP SERPL CALCULATED.3IONS-SCNC: 7 MMOL/L (ref 4–13)
BASOPHILS # BLD MANUAL: 0 THOUSAND/UL (ref 0–0.1)
BASOPHILS NFR MAR MANUAL: 0 % (ref 0–1)
BUN SERPL-MCNC: 19 MG/DL (ref 5–25)
CALCIUM SERPL-MCNC: 8.8 MG/DL (ref 8.3–10.1)
CHLORIDE SERPL-SCNC: 104 MMOL/L (ref 100–108)
CO2 SERPL-SCNC: 27 MMOL/L (ref 21–32)
CREAT SERPL-MCNC: 1.03 MG/DL (ref 0.6–1.3)
EOSINOPHIL # BLD MANUAL: 0.35 THOUSAND/UL (ref 0–0.4)
EOSINOPHIL NFR BLD MANUAL: 3 % (ref 0–6)
ERYTHROCYTE [DISTWIDTH] IN BLOOD BY AUTOMATED COUNT: 13.7 % (ref 11.6–15.1)
GFR SERPL CREATININE-BSD FRML MDRD: 79 ML/MIN/1.73SQ M
GLUCOSE SERPL-MCNC: 122 MG/DL (ref 65–140)
HCT VFR BLD AUTO: 46.1 % (ref 36.5–49.3)
HGB BLD-MCNC: 15.8 G/DL (ref 12–17)
LYMPHOCYTES # BLD AUTO: 1.74 THOUSAND/UL (ref 0.6–4.47)
LYMPHOCYTES # BLD AUTO: 15 % (ref 14–44)
MCH RBC QN AUTO: 30.4 PG (ref 26.8–34.3)
MCHC RBC AUTO-ENTMCNC: 34.3 G/DL (ref 31.4–37.4)
MCV RBC AUTO: 89 FL (ref 82–98)
MONOCYTES # BLD AUTO: 0.58 THOUSAND/UL (ref 0–1.22)
MONOCYTES NFR BLD: 5 % (ref 4–12)
NEUTROPHILS # BLD MANUAL: 8.95 THOUSAND/UL (ref 1.85–7.62)
NEUTS BAND NFR BLD MANUAL: 1 % (ref 0–8)
NEUTS SEG NFR BLD AUTO: 76 % (ref 43–75)
NRBC BLD AUTO-RTO: 0 /100 WBCS
PLATELET # BLD AUTO: 198 THOUSANDS/UL (ref 149–390)
PLATELET BLD QL SMEAR: ADEQUATE
PMV BLD AUTO: 8.8 FL (ref 8.9–12.7)
POTASSIUM SERPL-SCNC: 3.8 MMOL/L (ref 3.5–5.3)
RBC # BLD AUTO: 5.2 MILLION/UL (ref 3.88–5.62)
SODIUM SERPL-SCNC: 138 MMOL/L (ref 136–145)
TOTAL CELLS COUNTED SPEC: 100
TROPONIN I SERPL-MCNC: <0.02 NG/ML
WBC # BLD AUTO: 11.62 THOUSAND/UL (ref 4.31–10.16)

## 2020-03-26 PROCEDURE — 85027 COMPLETE CBC AUTOMATED: CPT | Performed by: EMERGENCY MEDICINE

## 2020-03-26 PROCEDURE — 99283 EMERGENCY DEPT VISIT LOW MDM: CPT

## 2020-03-26 PROCEDURE — 36415 COLL VENOUS BLD VENIPUNCTURE: CPT | Performed by: EMERGENCY MEDICINE

## 2020-03-26 PROCEDURE — 99283 EMERGENCY DEPT VISIT LOW MDM: CPT | Performed by: EMERGENCY MEDICINE

## 2020-03-26 PROCEDURE — 85007 BL SMEAR W/DIFF WBC COUNT: CPT | Performed by: EMERGENCY MEDICINE

## 2020-03-26 PROCEDURE — 84484 ASSAY OF TROPONIN QUANT: CPT | Performed by: EMERGENCY MEDICINE

## 2020-03-26 PROCEDURE — 80048 BASIC METABOLIC PNL TOTAL CA: CPT | Performed by: EMERGENCY MEDICINE

## 2020-03-26 NOTE — ED PROVIDER NOTES
History  Chief Complaint   Patient presents with    Cough     Pt reports being seen here last week for CP and SOB, was tested for COVID, resulted negative  States grandchildren are being seen here, and decided to return  HPI  Patient is a 63-year-old male, previously treated here in the emergency department recently for cough and congestion apparently had novel Coronavirus testing that was negative  Patient was here with his grandchildren and decided to have a repeat evaluation because he still has a persistent cough  Patient does have some fairly sharp left anterior chest pain somewhat worse when he coughs or takes a deep breath  He is not acutely short of breath  Has no shortness of breath with exertion he can speak in full sentences  Patient reports he still has some sense of shortness of breath  Patient denies any recent fever  He denies sputum production  He reports all of his grandchildren are sick with fever  Past medical history hypertension, kidney stone  Family history noncontributory  Social history, currently on disability, currently smoking  Prior to Admission Medications   Prescriptions Last Dose Informant Patient Reported? Taking?    albuterol (PROVENTIL HFA,VENTOLIN HFA) 90 mcg/act inhaler   No No   Sig: Inhale 2 puffs every 6 (six) hours as needed for wheezing for up to 10 days   amLODIPine (NORVASC) 10 mg tablet  Self No No   Sig: Take 1 tablet (10 mg total) by mouth daily   celecoxib (CeleBREX) 50 MG capsule  Self Yes No   Sig: Take 50 mg by mouth 2 (two) times a day   doxycycline hyclate (VIBRAMYCIN) 100 mg capsule   No No   Sig: Take 1 capsule (100 mg total) by mouth 2 (two) times a day for 10 days   lisinopril (ZESTRIL) 20 mg tablet  Self No No   Sig: TAKE 1 TABLET BY MOUTH EVERY DAY      Facility-Administered Medications: None       Past Medical History:   Diagnosis Date    Hypertension     Hypertension     Kidney stone     Recurrent umbilical hernia with incarceration resolved 12/11/2017       Past Surgical History:   Procedure Laterality Date    BACK SURGERY  10/2019    CYSTOSCOPY      HERNIA REPAIR      x2    HIP DEBRIDEMENT Right     incision and drainage of skin of abcess hip    LAPAROSCOPY N/A 3/14/2018    Procedure: DIAGNOSTIC LAPAROSCOPY; LYSIS OF ADHESIONS;  Surgeon: Aparna Hill MD;  [facility];  Service: General    NERVE BLOCK Bilateral 7/31/2018    Procedure: SPLANCHNIC NERVE BLOCK;  Surgeon: Jose Roberto Conley MD;  [facility];  Service: Pain Management     [place] LAP, VENTRAL HERNIA REPAIR,REDUCIBLE N/A 12/6/2017    Procedure: LAPAROSCOPIC UMBILICAL HERNIA REPAIR WITH MESH;  Surgeon: Aparna Hill MD;  [facility];  Service: General    US GUIDANCE  3/3/2020       Family History   Problem Relation Age of Onset    Hypertension Father     Cancer Father         throat      I have reviewed and agree with the history as documented  E-Cigarette/Vaping    E-Cigarette Use Never User      E-Cigarette/Vaping Substances    Nicotine No     THC No     CBD No     Flavoring No     Other No     Unknown No      Social History     Tobacco Use    Smoking status: Current Every Day Smoker     Packs/day: 0 20     Years: 30 00     Pack years: 6 00     Types: Cigarettes    Smokeless tobacco: Never Used   Substance Use Topics    Alcohol use: Yes     Comment: rarely    Drug use: No       Review of Systems   Constitutional: Negative for diaphoresis, fatigue and fever  HENT: Positive for congestion  Negative for ear pain, nosebleeds and sore throat  Eyes: Negative for photophobia, pain, discharge and visual disturbance  Respiratory: Positive for cough and shortness of breath  Negative for choking, chest tightness and wheezing  Cardiovascular: Positive for chest pain  Negative for palpitations  Gastrointestinal: Negative for abdominal distention, abdominal pain, diarrhea and vomiting     Genitourinary: Negative for dysuria, flank pain and frequency  Musculoskeletal: Negative for back pain, gait problem and joint swelling  Skin: Negative for color change and rash  Neurological: Negative for dizziness, syncope and headaches  Psychiatric/Behavioral: Negative for behavioral problems and confusion  The patient is not nervous/anxious  All other systems reviewed and are negative  Physical Exam  Physical Exam   Constitutional: He is oriented to person, place, and time  He appears well-developed and well-nourished  HENT:   Head: Normocephalic  Right Ear: External ear normal    Left Ear: External ear normal    Nose: Nose normal    Mouth/Throat: Oropharynx is clear and moist    Eyes: Pupils are equal, round, and reactive to light  EOM and lids are normal    Neck: Normal range of motion  Neck supple  Cardiovascular: Normal rate, regular rhythm, normal heart sounds and intact distal pulses  Pulmonary/Chest: Effort normal and breath sounds normal  No respiratory distress  No wheeze with forced expiration   Abdominal: Soft  Bowel sounds are normal  There is no tenderness  Musculoskeletal: Normal range of motion  He exhibits no deformity  Neurological: He is alert and oriented to person, place, and time  Skin: Skin is warm and dry  Psychiatric: He has a normal mood and affect  Nursing note and vitals reviewed    Pulse oximetry normal at 96% adequate oxygenation, no hypoxia    Vital Signs  ED Triage Vitals [03/26/20 1207]   Temperature Pulse Respirations Blood Pressure SpO2   97 6 °F (36 4 °C) 89 18 (!) 208/118 96 %      Temp Source Heart Rate Source Patient Position - Orthostatic VS BP Location FiO2 (%)   Tympanic Monitor Lying Left arm --      Pain Score       --           Vitals:    03/26/20 1207   BP: (!) 208/118   Pulse: 89   Patient Position - Orthostatic VS: Lying         Visual Acuity      ED Medications  Medications - No data to display    Diagnostic Studies  Results Reviewed     Procedure Component Value Units Date/Time    Troponin I [[id number]]  (Normal) Collected:  03/26/20 1245    Lab Status:  Final result Specimen:  Blood from Arm, Right Updated:  03/26/20 1322     Troponin I <0 02 ng/mL     CBC and differential [[id number]]  (Abnormal) Collected:  03/26/20 1245    Lab Status:  Final result Specimen:  Blood from Arm, Right Updated:  03/26/20 1316     WBC 11 62 Thousand/uL      RBC 5 20 Million/uL      Hemoglobin 15 8 g/dL      Hematocrit 46 1 %      MCV 89 fL      MCH 30 4 pg      MCHC 34 3 g/dL      RDW 13 7 %      MPV 8 8 fL      Platelets 061 Thousands/uL      nRBC 0 /100 WBCs     Basic metabolic panel [[id number]] Collected:  03/26/20 1245    Lab Status:  Final result Specimen:  Blood from Arm, Right Updated:  03/26/20 1311     Sodium 138 mmol/L      Potassium 3 8 mmol/L      Chloride 104 mmol/L      CO2 27 mmol/L      ANION GAP 7 mmol/L      BUN 19 mg/dL      Creatinine 1 03 mg/dL      Glucose 122 mg/dL      Calcium 8 8 mg/dL      eGFR 79 ml/min/1 73sq m     Narrative:       Mikey guidelines for Chronic Kidney Disease (CKD):     Stage 1 with normal or high GFR (GFR > 90 mL/min/1 73 square meters)    Stage 2 Mild CKD (GFR = 60-89 mL/min/1 73 square meters)    Stage 3A Moderate CKD (GFR = 45-59 mL/min/1 73 square meters)    Stage 3B Moderate CKD (GFR = 30-44 mL/min/1 73 square meters)    Stage 4 Severe CKD (GFR = 15-29 mL/min/1 73 square meters)    Stage 5 End Stage CKD (GFR <15 mL/min/1 73 square meters)  Note: GFR calculation is accurate only with a steady state creatinine                 No orders to display              Procedures  ECG 12 Lead Documentation Only  Date/Time: 3/26/2020 1:20 PM  Performed by: Eleanor Figueroa MD  Authorized by: Eleanor Figueroa MD     Indications / Diagnosis:  Chest pain  ECG reviewed by me, the ED Provider: yes    Patient location:  ED  Previous ECG:     Previous ECG:  Unavailable  Interpretation:     Interpretation: normal    Rate:     ECG rate: Eighty-seven    ECG rate assessment: normal    Rhythm:     Rhythm: sinus rhythm    Comments:      Normal sinus rhythm no acute ST-T wave changes             ED Course      reviewed with patient has seen her several days ago negative workup negative chest x-ray, I reviewed the studies  Patient was treated with doxycycline  He reports still with some congestion and some sense of shortness of breath although not acute  He reports left-sided chest pain  Discussed with patient we can re-evaluate him for his chest pain he agreed  EKG showed no acute changes, cardiac troponin drawn today was normal there is no sign of cardiac ischemia  White count was minimally elevated 11,000, nonspecific finding hemoglobin was normal at 15  Patient's electrolytes were within normal limits  Discussed with patient he just recently had chest x-ray I do not feel it is indicated to repeated  HEART Risk Score      Most Recent Value   Heart Score Risk Calculator   History  0 Filed at: 03/26/2020 1944   ECG  0 Filed at: 03/26/2020 1944   Age  1 Filed at: 03/26/2020 1944   Risk Factors  1 Filed at: 03/26/2020 1944   Troponin  0 Filed at: 03/26/2020 1944   HEART Score  2 Filed at: 03/26/2020 1944                              [facility] medical decision making 51-year-old male presents emergency department grandchildren sick with cough and congestion patient was previously worked appearing apparently had a negative test for the novel Coronavirus  He has persistent cough with sharp left-sided chest pain consistent with chest wall pain  EKG and cardiac markers were negative  I discussed with patient I believe he does still have the corona virus despite the negative testing he may be a false negative or the test sample may not have been adequate  This point he is tolerating the although swell    He has no hypoxia there is no indication for admission or further diagnostic testing we discussed outpatient treatment follow-up discussed indications to return  Disposition  Final diagnoses:   Viral illness   Chest wall pain     Time reflects when diagnosis was documented in both MDM as applicable and the Disposition within this note     Time User Action Codes Description Comment    3/26/2020  1:41 PM Reese Hi [B34 9] Viral illness     3/26/2020  1:41 PM Jean De Jesus Oneida [R07 89] Chest wall pain       ED Disposition     ED Disposition Condition Date/Time Comment    Discharge Stable Thu Mar 26, 2020  1:41 PM Emma Laureano discharge to home/self care  Follow-up Information    None         Discharge Medication List as of 3/26/2020  1:42 PM      CONTINUE these medications which have NOT CHANGED    Details   albuterol (PROVENTIL HFA,VENTOLIN HFA) 90 mcg/act inhaler Inhale 2 puffs every 6 (six) hours as needed for wheezing for up to 10 days, Starting Wed 3/18/2020, Until Sat 3/28/2020, Normal      amLODIPine (NORVASC) 10 mg tablet Take 1 tablet (10 mg total) by mouth daily, Starting u 2/6/2020, Normal      celecoxib (CeleBREX) 50 MG capsule Take 50 mg by mouth 2 (two) times a day, Historical Med      doxycycline hyclate (VIBRAMYCIN) 100 mg capsule Take 1 capsule (100 mg total) by mouth 2 (two) times a day for 10 days, Starting Wed 3/18/2020, Until Sat 3/28/2020, Normal      lisinopril (ZESTRIL) 20 mg tablet TAKE 1 TABLET BY MOUTH EVERY DAY, Normal           No discharge procedures on file      PDMP Review     None          ED Provider  Electronically Signed by           Starling Saint, MD  03/26/20 1553

## 2020-03-26 NOTE — DISCHARGE INSTRUCTIONS
no sign of worsening disease at this time, no sign of cardiac injury or heart attack    Rest  Return increasing short of breath or worsening symptoms  Follow up with your doctor

## 2020-04-02 ENCOUNTER — TELEPHONE (OUTPATIENT)
Dept: PAIN MEDICINE | Facility: CLINIC | Age: 60
End: 2020-04-02

## 2020-04-08 ENCOUNTER — TELEMEDICINE (OUTPATIENT)
Dept: PAIN MEDICINE | Facility: CLINIC | Age: 60
End: 2020-04-08
Payer: COMMERCIAL

## 2020-04-08 DIAGNOSIS — R10.84 ABDOMINAL PAIN, GENERALIZED: Primary | ICD-10-CM

## 2020-04-08 PROCEDURE — 3080F DIAST BP >= 90 MM HG: CPT | Performed by: ANESTHESIOLOGY

## 2020-04-08 PROCEDURE — 3077F SYST BP >= 140 MM HG: CPT | Performed by: ANESTHESIOLOGY

## 2020-04-08 PROCEDURE — 99213 OFFICE O/P EST LOW 20 MIN: CPT | Performed by: ANESTHESIOLOGY

## 2020-04-24 DIAGNOSIS — I10 ESSENTIAL HYPERTENSION: Primary | ICD-10-CM

## 2020-04-24 DIAGNOSIS — E78.2 MIXED HYPERLIPIDEMIA: ICD-10-CM

## 2020-04-24 DIAGNOSIS — R73.01 IMPAIRED FASTING GLUCOSE: ICD-10-CM

## 2020-04-24 DIAGNOSIS — E55.9 VITAMIN D DEFICIENCY: ICD-10-CM

## 2020-05-10 DIAGNOSIS — I10 ESSENTIAL HYPERTENSION: ICD-10-CM

## 2020-05-11 RX ORDER — LISINOPRIL 20 MG/1
TABLET ORAL
Qty: 90 TABLET | Refills: 1 | Status: SHIPPED | OUTPATIENT
Start: 2020-05-11

## 2020-07-16 DIAGNOSIS — J40 BRONCHITIS: ICD-10-CM

## 2020-07-20 RX ORDER — SODIUM CHLORIDE FOR INHALATION 0.9 %
3 VIAL, NEBULIZER (ML) INHALATION AS NEEDED
Qty: 300 ML | Refills: 0 | OUTPATIENT
Start: 2020-07-20 | End: 2020-07-25

## 2020-08-07 DIAGNOSIS — I10 ESSENTIAL HYPERTENSION: ICD-10-CM

## 2020-08-07 RX ORDER — AMLODIPINE BESYLATE 10 MG/1
TABLET ORAL
Qty: 90 TABLET | Refills: 1 | Status: SHIPPED | OUTPATIENT
Start: 2020-08-07

## 2020-11-04 ENCOUNTER — TELEPHONE (OUTPATIENT)
Dept: INTERNAL MEDICINE CLINIC | Facility: CLINIC | Age: 60
End: 2020-11-04

## 2021-02-08 ENCOUNTER — VBI (OUTPATIENT)
Dept: ADMINISTRATIVE | Facility: OTHER | Age: 61
End: 2021-02-08

## 2021-03-09 ENCOUNTER — TELEPHONE (OUTPATIENT)
Dept: PAIN MEDICINE | Facility: CLINIC | Age: 61
End: 2021-03-09

## 2022-07-08 NOTE — PROGRESS NOTES
Assessment:  1  Generalized abdominal pain     2  Chronic pain syndrome           Plan: This is a 62 y o male who presents for follow up s/p bilateral TAP block  Patient is doing much better  At this time, I recommend that he follow up as needed  If his pain returns, he may benefit from a repeat injection  Patient okay to return back to full duty at work  My impressions and treatment recommendations were discussed in detail with the patient who verbalized understanding and had no further questions  Discharge instructions were provided  I personally saw and examined the patient and I agree with the above discussed plan of care  History of Present Illness:  Ana Hagan is a 62 y o  male who presents for a follow up office visit in regards to Abdominal Pain  The patients current symptoms include constant dull/achy pain  The patient recently had a bilateral TAP block  He states that he is doing much better than before and that he is ready to return to work  Pain is rated 4/10  No recent changes in health  I have personally reviewed and/or updated the patient's past medical history, past surgical history, family history, social history, current medications, allergies, and vital signs today  Review of Systems   Respiratory: Negative for shortness of breath  Cardiovascular: Negative for chest pain  Gastrointestinal: Negative for constipation, diarrhea, nausea and vomiting  Musculoskeletal: Negative for arthralgias, gait problem, joint swelling and myalgias  Skin: Negative for rash  Neurological: Negative for dizziness, seizures and weakness  All other systems reviewed and are negative        Patient Active Problem List   Diagnosis    Recurrent umbilical hernia    Generalized abdominal pain    Post-op pain    Hyperlipidemia    Hypertension    Obesity    Abdominal wall pain    Chronic pain syndrome    Abdominal pain, generalized    Polyuria    Diarrhea    Strain of rectus abdominis muscle    Acute bronchitis with bronchospasm    Acute non-recurrent pansinusitis       Past Medical History:   Diagnosis Date    Hypertension     Hypertension     Kidney stone     Recurrent umbilical hernia with incarceration     resolved 12/11/2017       Past Surgical History:   Procedure Laterality Date    CYSTOSCOPY      HERNIA REPAIR      x2    HIP DEBRIDEMENT Right     incision and drainage of skin of abcess hip    LAPAROSCOPY N/A 3/14/2018    Procedure: DIAGNOSTIC LAPAROSCOPY; LYSIS OF ADHESIONS;  Surgeon: Billie العراقي MD;  [facility];  Service: General    NERVE BLOCK Bilateral 7/31/2018    Procedure: SPLANCHNIC NERVE BLOCK;  Surgeon: Dorothy Watson MD;  [facility];  Service: Pain Management     [place] LAP, VENTRAL HERNIA REPAIR,REDUCIBLE N/A 12/6/2017    Procedure: LAPAROSCOPIC UMBILICAL HERNIA REPAIR WITH MESH;  Surgeon: Billie العراقي MD;  [facility];  Service: General       Family History   Problem Relation Age of Onset    Hypertension Father     Cancer Father         throat        Social History     Occupational History    Not on file  Social History Main Topics    Smoking status: Current Every Day Smoker     Packs/day: 0 20     Years: 30 00     Types: Cigarettes    Smokeless tobacco: Never Used    Alcohol use Yes      Comment: rarely    Drug use: No    Sexual activity: Yes     Partners: Female       Current Outpatient Prescriptions on File Prior to Visit   Medication Sig    lisinopril (ZESTRIL) 20 mg tablet Take 1 tablet (20 mg total) by mouth daily    [DISCONTINUED] gabapentin (NEURONTIN) 100 mg capsule TAKE 2 CAPSULES 3 TIMES A DAY (Patient not taking: Reported on 10/10/2018)    [DISCONTINUED] gabapentin (NEURONTIN) 300 mg capsule Take 1 capsule (300 mg total) by mouth 3 (three) times a day (Patient not taking: Reported on 10/10/2018 )     No current facility-administered medications on file prior to visit          Allergies   Allergen Reactions    Ceclor [Cefaclor] Hives       Physical Exam:    BP (!) 154/110   Pulse 84   Ht 6' 1" (1 854 m)   Wt 127 kg (279 lb)   BMI 36 81 kg/m²     Constitutional:normal, well developed, well nourished, alert, in no distress and non-toxic and no overt pain behavior    Eyes:anicteric  HEENT:grossly intact  Neck:supple, symmetric, trachea midline and no masses   Pulmonary:even and unlabored  Cardiovascular:No edema or pitting edema present  Skin:Normal without rashes or lesions and well hydrated  Psychiatric:Mood and affect appropriate  Neurologic:Cranial Nerves II-XII grossly intact  Musculoskeletal:normal    Imaging Slurred speech No
